# Patient Record
Sex: MALE | Race: WHITE | Employment: OTHER | URBAN - METROPOLITAN AREA
[De-identification: names, ages, dates, MRNs, and addresses within clinical notes are randomized per-mention and may not be internally consistent; named-entity substitution may affect disease eponyms.]

---

## 2017-06-22 ENCOUNTER — PATIENT OUTREACH (OUTPATIENT)
Dept: CASE MANAGEMENT | Age: 72
End: 2017-06-22

## 2018-05-15 PROBLEM — E78.00 PURE HYPERCHOLESTEROLEMIA: Status: ACTIVE | Noted: 2018-05-15

## 2021-07-02 ENCOUNTER — HOSPITAL ENCOUNTER (OUTPATIENT)
Dept: CT IMAGING | Age: 76
Discharge: HOME | End: 2021-07-02
Attending: FAMILY MEDICINE
Payer: COMMERCIAL

## 2021-07-02 DIAGNOSIS — R59.0 LAD (LYMPHADENOPATHY) OF RIGHT CERVICAL REGION: ICD-10-CM

## 2021-07-02 LAB — CREAT BLD-MCNC: 1 MG/DL (ref 0.8–1.5)

## 2021-07-02 PROCEDURE — 82565 ASSAY OF CREATININE: CPT

## 2021-07-02 PROCEDURE — 70491 CT SOFT TISSUE NECK W/DYE: CPT

## 2021-07-02 PROCEDURE — 74011000258 HC RX REV CODE- 258: Performed by: FAMILY MEDICINE

## 2021-07-02 PROCEDURE — 74011000636 HC RX REV CODE- 636: Performed by: FAMILY MEDICINE

## 2021-07-02 RX ORDER — SODIUM CHLORIDE 0.9 % (FLUSH) 0.9 %
10 SYRINGE (ML) INJECTION
Status: COMPLETED | OUTPATIENT
Start: 2021-07-02 | End: 2021-07-02

## 2021-07-02 RX ADMIN — IOPAMIDOL 80 ML: 755 INJECTION, SOLUTION INTRAVENOUS at 16:07

## 2021-07-02 RX ADMIN — Medication 10 ML: at 16:07

## 2021-07-02 RX ADMIN — SODIUM CHLORIDE 100 ML: 900 INJECTION, SOLUTION INTRAVENOUS at 16:08

## 2021-07-06 NOTE — PROGRESS NOTES
I know the patient and his wife have already seen the report. It not, please let him know that he has a mass in his throat. Please let him know that he needs to be seen by ENT (I recommend Dr. Rafi Montemayor). I will also place referral to oncology as well.   ENT needs to occur first.

## 2021-07-12 DIAGNOSIS — C01 CANCER OF BASE OF TONGUE (HCC): Primary | ICD-10-CM

## 2021-07-13 ENCOUNTER — ANESTHESIA EVENT (OUTPATIENT)
Dept: SURGERY | Age: 76
End: 2021-07-13
Payer: MEDICARE

## 2021-07-13 ENCOUNTER — HOSPITAL ENCOUNTER (OUTPATIENT)
Dept: SURGERY | Age: 76
Discharge: HOME | End: 2021-07-13

## 2021-07-13 VITALS — WEIGHT: 195 LBS | BODY MASS INDEX: 24.25 KG/M2 | HEIGHT: 75 IN

## 2021-07-13 NOTE — PERIOP NOTES
Patient verified name and . Order for consent was found in EHR and matches case posting; patient verifies procedure. Direct Laryngoscopy and Esophagoscopy with biopsies ONE TIME Routine      Type 1b surgery, PAT phone assessment complete. Orders not received. Labs per surgeon: None found in EHR  Labs per anesthesia protocol: None    Patient states has completed Moderna COVID vaccine series. Instructed to bring vaccination record card DOS, verbalized understanding    Patient answered medical/surgical history questions at their best of ability. All prior to admission medications documented in [facility]. Patient instructed to take the following medications the day of surgery according to anesthesia guidelines with a small sip of water: Levothyroxine On the day before surgery please take Acetaminophen 1000mg in the morning and then again before bed. You may substitute for Tylenol 650 mg. Hold all vitamins 7 days prior to surgery and NSAIDS 5 days prior to surgery. Prescription meds to hold:Celebrex        Patient instructed on the following:    > Arrive at A Entrance, time of arrival to be called the day before by 1700  > NPO after midnight including gum, mints, and ice chips  > Responsible adult must drive patient to the hospital, stay during surgery, and patient will need supervision 24 hours after anesthesia  > Use antibacterial soap in shower the night before surgery and on the morning of surgery  > All piercings must be removed prior to arrival.    > Leave all valuables (money and jewelry) at home but bring insurance card and ID on DOS.   > You may be required to pay a deductible or co-pay on the day of your procedure. You can pre-pay by calling [phone] if your surgery is at the [facility] or [phone] if your surgery is at the [facility]. > Do not wear make-up, nail polish, lotions, cologne, perfumes, powders, or oil on skin. Artificial nails are not permitted.

## 2021-07-14 ENCOUNTER — HOSPITAL ENCOUNTER (OUTPATIENT)
Age: 76
Setting detail: OUTPATIENT SURGERY
Discharge: HOME | End: 2021-07-14
Attending: OTOLARYNGOLOGY | Admitting: OTOLARYNGOLOGY
Payer: MEDICARE

## 2021-07-14 ENCOUNTER — ANESTHESIA (OUTPATIENT)
Dept: SURGERY | Age: 76
End: 2021-07-14
Payer: MEDICARE

## 2021-07-14 VITALS
HEART RATE: 79 BPM | TEMPERATURE: 98.9 F | WEIGHT: 193 LBS | BODY MASS INDEX: 24 KG/M2 | OXYGEN SATURATION: 98 % | DIASTOLIC BLOOD PRESSURE: 79 MMHG | HEIGHT: 75 IN | RESPIRATION RATE: 16 BRPM | SYSTOLIC BLOOD PRESSURE: 145 MMHG

## 2021-07-14 DIAGNOSIS — C01 CANCER OF BASE OF TONGUE (HCC): ICD-10-CM

## 2021-07-14 PROCEDURE — 76010000160 HC OR TIME 0.5 TO 1 HR INTENSV-TIER 1: Performed by: OTOLARYNGOLOGY

## 2021-07-14 PROCEDURE — 74011000250 HC RX REV CODE- 250: Performed by: REGISTERED NURSE

## 2021-07-14 PROCEDURE — 74011250637 HC RX REV CODE- 250/637: Performed by: ANESTHESIOLOGY

## 2021-07-14 PROCEDURE — 76210000020 HC REC RM PH II FIRST 0.5 HR: Performed by: OTOLARYNGOLOGY

## 2021-07-14 PROCEDURE — 77030037088 HC TUBE ENDOTRACH ORAL NSL COVD-A: Performed by: ANESTHESIOLOGY

## 2021-07-14 PROCEDURE — 88305 TISSUE EXAM BY PATHOLOGIST: CPT

## 2021-07-14 PROCEDURE — 43191 ESOPHAGOSCOPY RIGID TRNSO DX: CPT | Performed by: OTOLARYNGOLOGY

## 2021-07-14 PROCEDURE — 76060000032 HC ANESTHESIA 0.5 TO 1 HR: Performed by: OTOLARYNGOLOGY

## 2021-07-14 PROCEDURE — 74011250636 HC RX REV CODE- 250/636: Performed by: ANESTHESIOLOGY

## 2021-07-14 PROCEDURE — 76210000006 HC OR PH I REC 0.5 TO 1 HR: Performed by: OTOLARYNGOLOGY

## 2021-07-14 PROCEDURE — 2709999900 HC NON-CHARGEABLE SUPPLY: Performed by: OTOLARYNGOLOGY

## 2021-07-14 PROCEDURE — 88341 IMHCHEM/IMCYTCHM EA ADD ANTB: CPT

## 2021-07-14 PROCEDURE — 88360 TUMOR IMMUNOHISTOCHEM/MANUAL: CPT

## 2021-07-14 PROCEDURE — 88342 IMHCHEM/IMCYTCHM 1ST ANTB: CPT

## 2021-07-14 PROCEDURE — 74011250637 HC RX REV CODE- 250/637: Performed by: OTOLARYNGOLOGY

## 2021-07-14 PROCEDURE — 74011250636 HC RX REV CODE- 250/636: Performed by: REGISTERED NURSE

## 2021-07-14 PROCEDURE — 77030021678 HC GLIDESCP STAT DISP VERT -B: Performed by: ANESTHESIOLOGY

## 2021-07-14 PROCEDURE — 31535 LARYNGOSCOPY W/BIOPSY: CPT | Performed by: OTOLARYNGOLOGY

## 2021-07-14 RX ORDER — SODIUM CHLORIDE 0.9 % (FLUSH) 0.9 %
5-40 SYRINGE (ML) INJECTION EVERY 8 HOURS
Status: DISCONTINUED | OUTPATIENT
Start: 2021-07-14 | End: 2021-07-14 | Stop reason: HOSPADM

## 2021-07-14 RX ORDER — HYDROCODONE BITARTRATE AND ACETAMINOPHEN 5; 325 MG/1; MG/1
1 TABLET ORAL AS NEEDED
Status: DISCONTINUED | OUTPATIENT
Start: 2021-07-14 | End: 2021-07-14 | Stop reason: HOSPADM

## 2021-07-14 RX ORDER — ACETAMINOPHEN 500 MG
1000 TABLET ORAL ONCE
Status: COMPLETED | OUTPATIENT
Start: 2021-07-14 | End: 2021-07-14

## 2021-07-14 RX ORDER — HYDROMORPHONE HYDROCHLORIDE 2 MG/ML
0.5 INJECTION, SOLUTION INTRAMUSCULAR; INTRAVENOUS; SUBCUTANEOUS
Status: DISCONTINUED | OUTPATIENT
Start: 2021-07-14 | End: 2021-07-14 | Stop reason: HOSPADM

## 2021-07-14 RX ORDER — EPHEDRINE SULFATE/0.9% NACL/PF 50 MG/5 ML
SYRINGE (ML) INTRAVENOUS AS NEEDED
Status: DISCONTINUED | OUTPATIENT
Start: 2021-07-14 | End: 2021-07-14 | Stop reason: HOSPADM

## 2021-07-14 RX ORDER — EPINEPHRINE NASAL SOLUTION 1 MG/ML
SOLUTION NASAL AS NEEDED
Status: DISCONTINUED | OUTPATIENT
Start: 2021-07-14 | End: 2021-07-14 | Stop reason: HOSPADM

## 2021-07-14 RX ORDER — LIDOCAINE HYDROCHLORIDE 10 MG/ML
0.1 INJECTION INFILTRATION; PERINEURAL AS NEEDED
Status: DISCONTINUED | OUTPATIENT
Start: 2021-07-14 | End: 2021-07-14 | Stop reason: HOSPADM

## 2021-07-14 RX ORDER — MIDAZOLAM HYDROCHLORIDE 1 MG/ML
2 INJECTION, SOLUTION INTRAMUSCULAR; INTRAVENOUS
Status: DISCONTINUED | OUTPATIENT
Start: 2021-07-14 | End: 2021-07-14 | Stop reason: HOSPADM

## 2021-07-14 RX ORDER — SODIUM CHLORIDE, SODIUM LACTATE, POTASSIUM CHLORIDE, CALCIUM CHLORIDE 600; 310; 30; 20 MG/100ML; MG/100ML; MG/100ML; MG/100ML
150 INJECTION, SOLUTION INTRAVENOUS CONTINUOUS
Status: DISCONTINUED | OUTPATIENT
Start: 2021-07-14 | End: 2021-07-14 | Stop reason: HOSPADM

## 2021-07-14 RX ORDER — FENTANYL CITRATE 50 UG/ML
100 INJECTION, SOLUTION INTRAMUSCULAR; INTRAVENOUS ONCE
Status: DISCONTINUED | OUTPATIENT
Start: 2021-07-14 | End: 2021-07-14 | Stop reason: HOSPADM

## 2021-07-14 RX ORDER — SUCCINYLCHOLINE CHLORIDE 20 MG/ML
INJECTION INTRAMUSCULAR; INTRAVENOUS AS NEEDED
Status: DISCONTINUED | OUTPATIENT
Start: 2021-07-14 | End: 2021-07-14 | Stop reason: HOSPADM

## 2021-07-14 RX ORDER — FAMOTIDINE 20 MG/1
20 TABLET, FILM COATED ORAL ONCE
Status: COMPLETED | OUTPATIENT
Start: 2021-07-14 | End: 2021-07-14

## 2021-07-14 RX ORDER — ACETAMINOPHEN 500 MG
1000 TABLET ORAL
Status: DISCONTINUED | OUTPATIENT
Start: 2021-07-14 | End: 2021-07-14 | Stop reason: HOSPADM

## 2021-07-14 RX ORDER — ONDANSETRON 2 MG/ML
INJECTION INTRAMUSCULAR; INTRAVENOUS AS NEEDED
Status: DISCONTINUED | OUTPATIENT
Start: 2021-07-14 | End: 2021-07-14 | Stop reason: HOSPADM

## 2021-07-14 RX ORDER — SODIUM CHLORIDE 9 MG/ML
50 INJECTION, SOLUTION INTRAVENOUS CONTINUOUS
Status: DISCONTINUED | OUTPATIENT
Start: 2021-07-14 | End: 2021-07-14 | Stop reason: HOSPADM

## 2021-07-14 RX ORDER — PROPOFOL 10 MG/ML
INJECTION, EMULSION INTRAVENOUS AS NEEDED
Status: DISCONTINUED | OUTPATIENT
Start: 2021-07-14 | End: 2021-07-14 | Stop reason: HOSPADM

## 2021-07-14 RX ORDER — FENTANYL CITRATE 50 UG/ML
INJECTION, SOLUTION INTRAMUSCULAR; INTRAVENOUS AS NEEDED
Status: DISCONTINUED | OUTPATIENT
Start: 2021-07-14 | End: 2021-07-14 | Stop reason: HOSPADM

## 2021-07-14 RX ORDER — LIDOCAINE HYDROCHLORIDE 20 MG/ML
INJECTION, SOLUTION EPIDURAL; INFILTRATION; INTRACAUDAL; PERINEURAL AS NEEDED
Status: DISCONTINUED | OUTPATIENT
Start: 2021-07-14 | End: 2021-07-14 | Stop reason: HOSPADM

## 2021-07-14 RX ORDER — DEXAMETHASONE SODIUM PHOSPHATE 4 MG/ML
INJECTION, SOLUTION INTRA-ARTICULAR; INTRALESIONAL; INTRAMUSCULAR; INTRAVENOUS; SOFT TISSUE AS NEEDED
Status: DISCONTINUED | OUTPATIENT
Start: 2021-07-14 | End: 2021-07-14 | Stop reason: HOSPADM

## 2021-07-14 RX ORDER — SODIUM CHLORIDE 0.9 % (FLUSH) 0.9 %
5-40 SYRINGE (ML) INJECTION AS NEEDED
Status: DISCONTINUED | OUTPATIENT
Start: 2021-07-14 | End: 2021-07-14 | Stop reason: HOSPADM

## 2021-07-14 RX ADMIN — PROPOFOL 30 MG: 10 INJECTION, EMULSION INTRAVENOUS at 09:49

## 2021-07-14 RX ADMIN — DEXAMETHASONE SODIUM PHOSPHATE 10 MG: 4 INJECTION, SOLUTION INTRAMUSCULAR; INTRAVENOUS at 09:43

## 2021-07-14 RX ADMIN — Medication 10 MG: at 09:57

## 2021-07-14 RX ADMIN — LIDOCAINE HYDROCHLORIDE 60 MG: 20 INJECTION, SOLUTION EPIDURAL; INFILTRATION; INTRACAUDAL; PERINEURAL at 09:39

## 2021-07-14 RX ADMIN — FAMOTIDINE 20 MG: 20 TABLET, FILM COATED ORAL at 07:25

## 2021-07-14 RX ADMIN — FENTANYL CITRATE 50 MCG: 50 INJECTION INTRAMUSCULAR; INTRAVENOUS at 09:39

## 2021-07-14 RX ADMIN — SUCCINYLCHOLINE CHLORIDE 140 MG: 20 INJECTION, SOLUTION INTRAMUSCULAR; INTRAVENOUS at 09:39

## 2021-07-14 RX ADMIN — PROPOFOL 180 MG: 10 INJECTION, EMULSION INTRAVENOUS at 09:39

## 2021-07-14 RX ADMIN — SODIUM CHLORIDE, SODIUM LACTATE, POTASSIUM CHLORIDE, AND CALCIUM CHLORIDE: 600; 310; 30; 20 INJECTION, SOLUTION INTRAVENOUS at 09:47

## 2021-07-14 RX ADMIN — ACETAMINOPHEN 1000 MG: 500 TABLET, FILM COATED ORAL at 07:25

## 2021-07-14 RX ADMIN — PROPOFOL 30 MG: 10 INJECTION, EMULSION INTRAVENOUS at 09:47

## 2021-07-14 RX ADMIN — SODIUM CHLORIDE, SODIUM LACTATE, POTASSIUM CHLORIDE, AND CALCIUM CHLORIDE 150 ML/HR: 600; 310; 30; 20 INJECTION, SOLUTION INTRAVENOUS at 07:25

## 2021-07-14 RX ADMIN — Medication 10 MG: at 09:46

## 2021-07-14 RX ADMIN — FENTANYL CITRATE 50 MCG: 50 INJECTION INTRAMUSCULAR; INTRAVENOUS at 10:07

## 2021-07-14 RX ADMIN — ONDANSETRON 4 MG: 2 INJECTION INTRAMUSCULAR; INTRAVENOUS at 09:49

## 2021-07-14 NOTE — ANESTHESIA PREPROCEDURE EVALUATION
Relevant Problems   No relevant active problems       Anesthetic History   No history of anesthetic complications            Review of Systems / Medical History  Patient summary reviewed and pertinent labs reviewed    Pulmonary  Within defined limits                 Neuro/Psych   Within defined limits           Cardiovascular  Within defined limits                Exercise tolerance: >4 METS     GI/Hepatic/Renal  Within defined limits              Endo/Other      Hypothyroidism: well controlled  Arthritis     Other Findings              Physical Exam    Airway  Mallampati: II  TM Distance: 4 - 6 cm  Neck ROM: normal range of motion   Mouth opening: Normal     Cardiovascular  Regular rate and rhythm,  S1 and S2 normal,  no murmur, click, rub, or gallop  Rhythm: regular  Rate: normal         Dental    Dentition: Caps/crowns     Pulmonary  Breath sounds clear to auscultation               Abdominal         Other Findings            Anesthetic Plan    ASA: 2  Anesthesia type: general          Induction: Intravenous  Anesthetic plan and risks discussed with: Patient and Spouse

## 2021-07-14 NOTE — H&P
79 yo male seen earlier this wk for R neck mass and concern for BOT mass. He first noted some R neck swelling back in Feb- it has remained present since then but there has been some fluctuation in size. He has not had any throat pain, dysphagia or hoarseness but he has spit up some blood on several occasions, most recently this morning. He was able to get it stopped by gargling some cold water. He was worked up w/ CT neck recently at [facility]. He smoked for about 2 yrs but quit back in . Denies any alcohol abuse. Past Medical History:   Diagnosis Date    Arthritis     High cholesterol     Thyroid disease      Past Surgical History:   Procedure Laterality Date    HX COLONOSCOPY  10/26/2015    HX COLONOSCOPY  10/26/2015     Social History     Socioeconomic History    Marital status:      Spouse name: Not on file    Number of children: Not on file    Years of education: Not on file    Highest education level: Not on file   Occupational History    Not on file   Tobacco Use    Smoking status: Former Smoker     Types: Cigarettes     Quit date: 1970     Years since quittin.0    Smokeless tobacco: Never Used   Vaping Use    Vaping Use: Never used   Substance and Sexual Activity    Alcohol use: No     Alcohol/week: 0.0 standard drinks    Drug use: No    Sexual activity: Not Currently   Other Topics Concern    Not on file   Social History Narrative    Not on file     Social Determinants of Health     Financial Resource Strain:     Difficulty of Paying Living Expenses:    Food Insecurity:     Worried About [address] in the Last Year:     [address] in the Last Year:    Transportation Needs:     Lack of Transportation (Medical):      Lack of Transportation (Non-Medical):    Physical Activity:     Days of Exercise per Week:     Minutes of Exercise per Session:    Stress:     Feeling of Stress :    Social Connections:     Frequency of Communication with Friends and Family:  Frequency of Social Gatherings with Friends and Family:     Attends [employer] Services:     Active Member of Clubs or Organizations:     Attends Club or Organization Meetings:     Marital Status:    Intimate Partner Violence:     Fear of Current or Ex-Partner:     Emotionally Abused:     Physically Abused:     Sexually Abused:      Family History   Problem Relation Age of Onset    Cancer Mother     Heart Attack Father     Hypertension Sister     Alcohol abuse Brother     No Known Problems Maternal Grandmother     No Known Problems Maternal Grandfather     No Known Problems Paternal Grandfather      No Known Allergies    No current facility-administered medications on file prior to encounter. Current Outpatient Medications on File Prior to Encounter   Medication Sig Dispense Refill    celecoxib (CELEBREX) 200 mg capsule Take 1 Capsule by mouth daily. (Patient taking differently: Take 200 mg by mouth daily as needed.) 30 Capsule 5    levothyroxine (SYNTHROID) 75 mcg tablet Take 1 Tab by mouth Daily (before breakfast). 30 Tab 5    acetaminophen (TYLENOL) 650 mg CR tablet Take 650 mg by mouth daily.  coenzyme q10 (CO Q-10) 10 mg cap Take  by mouth.  multivitamin (ONE A DAY) tablet Take 1 Tab by mouth daily. EXAM:  Visit Vitals  BP (!) 145/80 (BP 1 Location: Left upper arm, BP Patient Position: At rest)   Pulse 80   Temp 98.1 °F (36.7 °C)   Resp 16   Ht 6' 3\" (1.905 m)   Wt 193 lb (87.5 kg)   SpO2 97%   BMI 24.12 kg/m²     General: NAD, well-appearing  Neuro: No gross neuro deficits. CN's II-XII intact. No facial weakness. Eyes: EOMI. Pupils reactive. No periorbital edema/ecchymosis. No nystagmus. Skin: No facial erythema, rashes or concerning lesions. Nose: No external deviations or saddling. Intranasally, septum is midline without perforations, nasal mucosa appears healthy with no erythema, mucopurulence, or polyps.   Mouth: Moist mucus membranes, some dried blood along palate, normal tongue/palate mobility, no concerning mucosal lesions. Oropharynx clear with no erythema/exudate, no tonsillar hypertrophy. Ears: Normal appearing auricles, no hematomas. EACs clear with no cerumen impaction, healthy canal skin, TM's intact with no perforations or retraction pockets. No middle ear effusions. Neck: Soft, supple, there is 2-3 cm firm but mobile R level 2 neck mass. No palpable parotid or submandibular masses. No thyromegaly or palpable thyroid nodules. No surgical scars. Lymphatics: No palpable cervical LAD on L side. CV: No JVD, no murmurs  Resp: No audible stridor or wheezing. Extremities: No clubbing or cyanosis. IMAGING:  CT neck-     FINDINGS: There is normal opacification of the major intracranial vessels. The  paranasal sinuses and mastoid air cells are clear. The nasopharynx is normal.  There is a 3.0 x 3.2 cm enhancing mass within the oropharynx on the right at the  level of the base of tongue. This is just cranial to the hyoid. There is an  enlarged jugulodigastric node, ipsilateral, on the right, subjacent to the BB  measuring 2.8 x 1.5 cm. The larynx and hypopharynx are normal. No abnormality of  the thyroid gland.     Evaluation of the upper lungs demonstrates no definite abnormality.     Bone window evaluation demonstrates no aggressive osseous lesions.     IMPRESSION  Oropharyngeal mass at the level of the base of tongue with enlarged  ipsilateral jugulodigastric lymph node. 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 A/P:  He has a R BOT neoplasm with associated R level 2 cervical node- this is highly concerning for underlying SCCA. The recent bleeding is most likely from this tumor. I will add him on later this wk for DL/E w/ bx of this lesion.  I discussed all the risks of laryngoscopy including bleeding, pharyngeal perforation, hoarseness, damage to teeth/lips/gums, and need for further procedures and he would like to cassandra.     [place] [address]

## 2021-07-14 NOTE — BRIEF OP NOTE
Brief Postoperative Note    Patient: Saman Sotelo  YOB: 1945  MRN: [id number]    Date of Procedure: 7/14/2021     Pre-Op Diagnosis: R base of Tongue mass     Post-Op Diagnosis: R base of Tongue mass    Procedure(s):  LARYNGOSCOPY DIRECT  ESOPHAGOSCOPY WITH BIOPSY    Surgeon(s):  Rodriguez Espinal MD    Surgical Assistant: Surg Asst-1: Paolo Wilburn    Anesthesia: General     Estimated Blood Loss (mL): Minimal    Complications: None    Specimens:   ID Type Source Tests Collected by Time Destination   1 : right tongue base mass Preservative Mass  Rodriguez Espinal MD 7/14/2021 2159 Pathology        Implants: * No implants in log *    Drains: * No LDAs found *    Findings: friable exophytic mass along R BOT- epiglottis was clear    Electronically Signed by Janie Baron MD on 7/14/2021 at 9:54 AM

## 2021-07-14 NOTE — OP NOTES
[address] [address]  OPERATIVE REPORT    Name:  Author Daily  MR#:  [id number]  :  1945  ACCOUNT #:  [de-identified]  DATE OF SERVICE:  2021    PREOPERATIVE DIAGNOSIS:  Right base of tongue neoplasm. POSTOPERATIVE DIAGNOSIS:  Right base of tongue neoplasm. PROCEDURES PERFORMED:  1. Rigid esophagoscopy. 2.  Direct laryngoscopy with biopsy. SURGEON:  Annette Barnes. Amos Brewster MD    ANESTHESIA:  General endotracheal.    ANESTHESIOLOGIST:  Shanthi Boles. Serafin Bright MD    COMPLICATIONS:  None. SPECIMENS REMOVED:  Right base of tongue mass - permanent. ESTIMATED BLOOD LOSS:  Minimal.    OPERATIVE FINDINGS:  1. There were no concerning lesions within the cervical esophagus. 2.  There was a friable, exophytic mass along the right base of tongue measuring at least 2 cm in size. It did cross midline to the left side, but did not involve the epiglottis or the right lateral pharyngeal wall. Multiple superficial and deep biopsies were taken. 3.  There were no other concerning lesions within the larynx or pharynx. IV FLUIDS:  500 mL crystalloid. DRAINS:  None. DISPOSITION:  PACU, then home. CONDITION:  Stable. BRIEF HISTORY:  The patient is a 68-year-old male who presented to my office earlier this week with enlarging right neck mass. He was worked up with a CT scan which revealed an enlarged right level 2 cervical lymph node as well as a mass along the right base of tongue. Interestingly, he has not had any sore throat or trouble swallowing during this time period. Flexible laryngoscopy in office revealed a friable mass along the right base of tongue. The decision was made to take him to the operating room for direct laryngoscopy, esophagoscopy, and biopsy of this mass. DESCRIPTION OF PROCEDURE:  The patient was brought back to the operating room and placed on the table in the supine position. General endotracheal anesthesia was inducted without any complications.   GlideScope was used to help with intubation due to the presence of a right tongue base mass. Once the patient was adequately sedated, the head of the table was turned 90 degrees counterclockwise. A shoulder roll was placed for improved neck extension. He was then sterilely prepped and draped in the usual fashion. After placing a maxillary dental guard, I first proceeded with rigid esophagoscopy. I advanced the rigid Jesberg esophagoscope down the right lateral gutter. It passed easily down into the cervical esophagus and I advanced about alf down the cervical esophagus. There were no strictures or concerning lesions noted. I was able to carefully withdraw the scope gaining full 360-degree visualization of the lumen on way out. Next, I proceeded with laryngoscopy. I used the Dedo laryngoscope to perform full visualization of the patient's larynx and pharynx. I advanced it down the left lateral gutter initially. The left tonsil and lateral pharyngeal wall appeared normal in appearance. I advanced it down into the left hypopharynx. There was a normal-appearing piriform sinus. I then moved the scope from left to the right along the posterior pharyngeal wall which was clear as well. Right piriform sinus was healthy as well. I pulled the scope back to visualize the glottis. There were normal appearing vocal cords bilaterally and normal supraglottic structures. As I pulled it back even further, I visualized the epiglottis which was normal in appearance. There was, however, an exophytic and friable mass along the right base of tongue, it measured at least 2 cm in size and did cross midline to the left side. There was no extension onto the lingual surface of the epiglottis and the right lateral pharyngeal wall was clear as well. At this point, multiple superficial and deep biopsies were taken using a large-cupped forceps.   There was just a mild amount of bleeding which was controlled using adrenaline-soaked neuro patwerner. Once hemostasis was ensured, all instrumentation was removed. I suctioned out the patient's stomach and then he was turned back to the anesthesia team, extubated and taken to the PACU in stable condition afterwards.       Colletta Bracket, MD [id number]  D:  07/14/2021 10:06  T:  07/14/2021 17:55  JOB #:  [id number]

## 2021-07-14 NOTE — ANESTHESIA POSTPROCEDURE EVALUATION
Procedure(s):  LARYNGOSCOPY DIRECT  ESOPHAGOSCOPY WITH BIOPSY.     general    Anesthesia Post Evaluation      Multimodal analgesia: multimodal analgesia used between 6 hours prior to anesthesia start to PACU discharge  Patient location during evaluation: PACU  Patient participation: complete - patient participated  Level of consciousness: awake and alert  Pain management: adequate  Airway patency: patent  Anesthetic complications: no  Cardiovascular status: acceptable and hemodynamically stable  Respiratory status: acceptable  Hydration status: acceptable  Post anesthesia nausea and vomiting:  controlled  Final Post Anesthesia Temperature Assessment:  Normothermia (36.0-37.5 degrees C)      INITIAL Post-op Vital signs:   Vitals Value Taken Time   /67 07/14/21 1025   Temp 37.2 °C (98.9 °F) 07/14/21 1013   Pulse 83 07/14/21 1025   Resp 16 07/14/21 1025   SpO2 98 % 07/14/21 1025

## 2021-07-14 NOTE — DISCHARGE INSTRUCTIONS
-Please start w/ liquids and soft foods and then advance to regular diet  -There may be some blood in your saliva for first couple of days after surgery  -You may have soreness along posterior neck after surgery      After general anesthesia or intravenous sedation, for 24 hours or while taking prescription Narcotics:  · Limit your activities  · A responsible adult needs to be with you for the next 24 hours  · Do not drive and operate hazardous machinery  · Do not make important personal or business decisions  · Do not drink alcoholic beverages  · If you have not urinated within 8 hours after discharge, please contact your surgeon on call. · If you have sleep apnea and have a CPAP machine, please use it for all naps and sleeping. · Please use caution when taking narcotics and any of your home medications that may cause drowsiness. *  Please give a list of your current medications to your Primary Care Provider. *  Please update this list whenever your medications are discontinued, doses are      changed, or new medications (including over-the-counter products) are added. *  Please carry medication information at all times in case of emergency situations. These are general instructions for a healthy lifestyle:  No smoking/ No tobacco products/ Avoid exposure to second hand smoke  Surgeon General's Warning:  Quitting smoking now greatly reduces serious risk to your health. Obesity, smoking, and sedentary lifestyle greatly increases your risk for illness  A healthy diet, regular physical exercise & weight monitoring are important for maintaining a healthy lifestyle    You may be retaining fluid if you have a history of heart failure or if you experience any of the following symptoms:  Weight gain of 3 pounds or more overnight or 5 pounds in a week, increased swelling in our hands or feet or shortness of breath while lying flat in bed.   Please call your doctor as soon as you notice any of these symptoms; do not wait until your next office visit.

## 2021-07-22 NOTE — PROGRESS NOTES
This note will not be viewable in [address] PATIENT OUTREACH    Initial attempt to schedule AWV appointment unsuccessful. Unable to reach patient. Message left for call back or to directly contact [address] @ [phone] during regular office hours, if preferred, to schedule AWV appointment.
L. chest & back

## 2021-07-29 ENCOUNTER — HOSPITAL ENCOUNTER (OUTPATIENT)
Dept: PET IMAGING | Age: 76
Discharge: HOME | End: 2021-07-29
Payer: MEDICARE

## 2021-07-29 DIAGNOSIS — C01 SQUAMOUS CELL CARCINOMA OF BASE OF TONGUE (HCC): ICD-10-CM

## 2021-07-29 LAB
GLUCOSE BLD STRIP.AUTO-MCNC: 92 MG/DL (ref 65–100)
SERVICE CMNT-IMP: NORMAL

## 2021-07-29 PROCEDURE — A9552 F18 FDG: HCPCS

## 2021-07-29 PROCEDURE — 82962 GLUCOSE BLOOD TEST: CPT

## 2021-07-29 PROCEDURE — 74011000636 HC RX REV CODE- 636: Performed by: OTOLARYNGOLOGY

## 2021-07-29 RX ORDER — SODIUM CHLORIDE 0.9 % (FLUSH) 0.9 %
10 SYRINGE (ML) INJECTION
Status: COMPLETED | OUTPATIENT
Start: 2021-07-29 | End: 2021-07-29

## 2021-07-29 RX ORDER — FLUDEOXYGLUCOSE F-18 200 MCI/ML
10 INJECTION INTRAVENOUS ONCE
Status: COMPLETED | OUTPATIENT
Start: 2021-07-29 | End: 2021-07-29

## 2021-07-29 RX ADMIN — Medication 10 ML: at 11:05

## 2021-07-29 RX ADMIN — DIATRIZOATE MEGLUMINE AND DIATRIZOATE SODIUM 10 ML: 660; 100 LIQUID ORAL; RECTAL at 11:05

## 2021-07-29 RX ADMIN — FLUDEOXYGLUCOSE F-18 13.91 MILLICURIE: 200 INJECTION INTRAVENOUS at 11:05

## 2021-08-02 PROBLEM — C01 SQUAMOUS CELL CARCINOMA OF BASE OF TONGUE (HCC): Status: ACTIVE | Noted: 2021-08-02

## 2021-08-03 ENCOUNTER — HOSPITAL ENCOUNTER (OUTPATIENT)
Dept: LAB | Age: 76
Discharge: HOME | End: 2021-08-03
Payer: MEDICARE

## 2021-08-03 ENCOUNTER — HOSPITAL ENCOUNTER (OUTPATIENT)
Dept: RADIATION ONCOLOGY | Age: 76
Discharge: HOME | End: 2021-08-03
Payer: MEDICARE

## 2021-08-03 VITALS
DIASTOLIC BLOOD PRESSURE: 90 MMHG | SYSTOLIC BLOOD PRESSURE: 135 MMHG | TEMPERATURE: 97.6 F | HEART RATE: 83 BPM | BODY MASS INDEX: 26 KG/M2 | WEIGHT: 191.7 LBS | OXYGEN SATURATION: 95 %

## 2021-08-03 DIAGNOSIS — C01 SQUAMOUS CELL CARCINOMA OF BASE OF TONGUE (HCC): ICD-10-CM

## 2021-08-03 LAB
ALBUMIN SERPL-MCNC: 3.8 G/DL (ref 3.2–4.6)
ALBUMIN/GLOB SERPL: 1 {RATIO} (ref 1.2–3.5)
ALP SERPL-CCNC: 63 U/L (ref 50–136)
ALT SERPL-CCNC: 26 U/L (ref 12–65)
ANION GAP SERPL CALC-SCNC: 4 MMOL/L (ref 7–16)
AST SERPL-CCNC: 20 U/L (ref 15–37)
BASOPHILS # BLD: 0 K/UL (ref 0–0.2)
BASOPHILS NFR BLD: 1 % (ref 0–2)
BILIRUB SERPL-MCNC: 0.9 MG/DL (ref 0.2–1.1)
BUN SERPL-MCNC: 14 MG/DL (ref 8–23)
CALCIUM SERPL-MCNC: 9.1 MG/DL (ref 8.3–10.4)
CHLORIDE SERPL-SCNC: 109 MMOL/L (ref 98–107)
CO2 SERPL-SCNC: 29 MMOL/L (ref 21–32)
CREAT SERPL-MCNC: 0.9 MG/DL (ref 0.8–1.5)
DIFFERENTIAL METHOD BLD: ABNORMAL
EOSINOPHIL # BLD: 0.2 K/UL (ref 0–0.8)
EOSINOPHIL NFR BLD: 3 % (ref 0.5–7.8)
ERYTHROCYTE [DISTWIDTH] IN BLOOD BY AUTOMATED COUNT: 12.8 % (ref 11.9–14.6)
GLOBULIN SER CALC-MCNC: 3.7 G/DL (ref 2.3–3.5)
GLUCOSE SERPL-MCNC: 73 MG/DL (ref 65–100)
HCT VFR BLD AUTO: 35.9 %
HGB BLD-MCNC: 12 G/DL (ref 13.6–17.2)
IMM GRANULOCYTES # BLD AUTO: 0 K/UL (ref 0–0.5)
IMM GRANULOCYTES NFR BLD AUTO: 0 % (ref 0–5)
LYMPHOCYTES # BLD: 0.8 K/UL (ref 0.5–4.6)
LYMPHOCYTES NFR BLD: 13 % (ref 13–44)
MAGNESIUM SERPL-MCNC: 2 MG/DL (ref 1.8–2.4)
MCH RBC QN AUTO: 30.5 PG (ref 26.1–32.9)
MCHC RBC AUTO-ENTMCNC: 33.4 G/DL (ref 31.4–35)
MCV RBC AUTO: 91.3 FL (ref 79.6–97.8)
MONOCYTES # BLD: 1 K/UL (ref 0.1–1.3)
MONOCYTES NFR BLD: 16 % (ref 4–12)
NEUTS SEG # BLD: 4.1 K/UL (ref 1.7–8.2)
NEUTS SEG NFR BLD: 67 % (ref 43–78)
NRBC # BLD: 0 K/UL (ref 0–0.2)
PLATELET # BLD AUTO: 204 K/UL (ref 150–450)
PMV BLD AUTO: 8.9 FL (ref 9.4–12.3)
POTASSIUM SERPL-SCNC: 3.7 MMOL/L (ref 3.5–5.1)
PROT SERPL-MCNC: 7.5 G/DL (ref 6.3–8.2)
RBC # BLD AUTO: 3.93 M/UL (ref 4.23–5.6)
SODIUM SERPL-SCNC: 142 MMOL/L (ref 136–145)
WBC # BLD AUTO: 6.1 K/UL (ref 4.3–11.1)

## 2021-08-03 PROCEDURE — 36415 COLL VENOUS BLD VENIPUNCTURE: CPT

## 2021-08-03 PROCEDURE — 85025 COMPLETE CBC W/AUTO DIFF WBC: CPT

## 2021-08-03 PROCEDURE — 80053 COMPREHEN METABOLIC PANEL: CPT

## 2021-08-03 PROCEDURE — 83735 ASSAY OF MAGNESIUM: CPT

## 2021-08-03 PROCEDURE — 99211 OFF/OP EST MAY X REQ PHY/QHP: CPT

## 2021-08-03 NOTE — PROGRESS NOTES
Pt is here for the initial RT consult with Dr. Rakan Chowdhury for right base of tongue SCC, p16+. Pt will be going to the dentist this afternoon to get dental clearance for RT. The plan is for pt to receive chemo/RT and he is followed by Dr. Bailee Strong and Kathlene Alpers, RD in [address] [place] [address]. His PET scan indicated not only the tongue cancer, but also activity of his lower rectal wall. Per Dr. Rakan Chowdhury, Dr. Bailee Strong will refer pt for a colonoscopy after pt. finishes his tongue cancer treatment. He is accompanied today by his wife. An overview of RT was given. RT consents were signed. The CT/Sim appt will be set once pt has dental clearance.

## 2021-08-03 NOTE — PROGRESS NOTES
Patient: Michelle Kennedy MRN: [id number]  SSN: [id number]    YOB: 1945  Age: 78 y.o. Sex: male      Other Providers:  Dr. Li Easton, Dr. 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 male who I am seeing at the request of Dr. Li Easton. Mr. Cy Cornejo has a remote smoking history (2 years, quit in 1970) and was in his usual state of health until approximately 02/2021 at which time he received his first Covid vaccination and noted swelling in his right neck. He was treated with empiric antibiotics and symptoms improved until 04/2021 at which time he had a recurrence also treated with antibiotics. He did well until 06/2021 when symptoms recurred and he was referred to ENT. On 7/2/2021 he underwent a CT of the neck which demonstrated a 3.0 x 3.2cm enhancing mass within the oropharynx on the right at the level of the base of tongue as well as an enlarged ipsilateral level 2 lymph node measuring 2.8 x 1.5cm. He was evaluated by Dr. Li Easton on 7/14/21 who noted a 2-3cm firm but mobile R level 2 neck mass. On 7/14/21 he underwent direct laryngoscopy and biopsy which confirmed a friable, exophytic mass along the right base of tongue measuring at least 2 cm in size, crossing midline to the lift, but not involving the epiglottis or right lateral pharyngeal wall. Pathology confirmed poorly differentiated squamous cell carcinoma, strongly p16 positive. PET/CT 7/29/21 demonstrated. A 1.9ZT hypermetabolic right base of tongue mass with bilateral enlarged level 2 lymph nodes measuring 19mm short axis on the right neck and 11mm short axis in the left neck as well as questionable FDG avidity in the lower rectal wall with soft tissue thickening on the noncontrast CT.  Mr. Cy Cornejo endorses persistent swelling in the neck and mild soreness with swallowing but denies significant pain. He is followed regularly by a dentist with no major dental issues. His energy level and appetite are excellent. He has no nausea, vomiting, or diarrhea and denies rectal bleeding. PAST MEDICAL HISTORY:    Past Medical History:   Diagnosis Date    Arthritis     Cancer of base of tongue ([address]     High cholesterol     Thyroid disease      The patient denies history of collagen vascular diseases, pacemaker insertion, prior radiation or prior chemotherapy. PAST SURGICAL HISTORY:   Past Surgical History:   Procedure Laterality Date    HX COLONOSCOPY  10/26/2015    HX COLONOSCOPY  10/26/2015    HX HEENT Right 07/14/2021    DL/E w/ bx of R BOT neoplasmDixie Code       MEDICATIONS:     Current Outpatient Medications:     CHOLESTEROL PO, Take  by mouth., Disp: , Rfl:     ondansetron (ZOFRAN ODT) 8 mg disintegrating tablet, Take 1 Tablet by mouth every eight (8) hours as needed for Nausea or Vomiting. Indications: prevent nausea and vomiting from cancer chemotherapy, Disp: 90 Tablet, Rfl: 1    prochlorperazine (Compazine) 10 mg tablet, Take 1 Tablet by mouth every six (6) hours as needed for Nausea or Vomiting. Indications: prevent nausea and vomiting from cancer chemotherapy, Disp: 90 Tablet, Rfl: 1    celecoxib (CELEBREX) 200 mg capsule, Take 1 Capsule by mouth daily. (Patient taking differently: Take 200 mg by mouth daily as needed.), Disp: 30 Capsule, Rfl: 5    levothyroxine (SYNTHROID) 75 mcg tablet, Take 1 Tab by mouth Daily (before breakfast). , Disp: 30 Tab, Rfl: 5    acetaminophen (TYLENOL) 650 mg CR tablet, Take 650 mg by mouth daily. , Disp: , Rfl:     coenzyme q10 (CO Q-10) 10 mg cap, Take  by mouth., Disp: , Rfl:     multivitamin (ONE A DAY) tablet, Take 1 Tab by mouth daily. , Disp: , Rfl:     ALLERGIES:   No Known Allergies    SOCIAL HISTORY:   Social History     Socioeconomic History    Marital status:      Spouse name: Not on file    Number of children: Not on file  Years of education: Not on file    Highest education level: Not on file   Occupational History    Not on file   Tobacco Use    Smoking status: Former Smoker     Types: Cigarettes     Quit date: 1970     Years since quittin.1    Smokeless tobacco: Never Used   Vaping Use    Vaping Use: Never used   Substance and Sexual Activity    Alcohol use: No     Alcohol/week: 0.0 standard drinks    Drug use: No    Sexual activity: Not Currently   Other Topics Concern    Not on file   Social History Narrative    Not on file     Social Determinants of Health     Financial Resource Strain:     Difficulty of Paying Living Expenses:    Food Insecurity:     Worried About Running Out of Food in the Last Year:     [address] in the Last Year:    Transportation Needs:     Lack of Transportation (Medical):  Lack of Transportation (Non-Medical):    Physical Activity:     Days of Exercise per Week:     Minutes of Exercise per Session:    Stress:     Feeling of Stress :    Social Connections:     Frequency of Communication with Friends and Family:     Frequency of Social Gatherings with Friends and Family:     Attends [employer] Services:     Active Member of Clubs or Organizations:     Attends Club or Organization Meetings:     Marital Status:    Intimate Partner Violence:     Fear of Current or Ex-Partner:     Emotionally Abused:     Physically Abused:     Sexually Abused:        FAMILY HISTORY:   Family History   Problem Relation Age of Onset    Cancer Mother     Heart Attack Father     Hypertension Sister     Alcohol abuse Brother     No Known Problems Maternal Grandmother     No Known Problems Maternal Grandfather     No Known Problems Paternal Grandfather        REVIEW OF SYSTEMS:   A full 12-point review of systems was completed and was negative unless noted in the history of present illness.     PHYSICAL EXAMINATION:   ECOG Performance status 0  VITAL SIGNS:   Visit Vitals  BP (!) 135/90   Pulse 83   Temp 97.6 °F (36.4 °C)   Wt 87 kg (191 lb 11.2 oz)   SpO2 95%   BMI 26.00 kg/m²     General: well developed/nourished adult Male in no acute distress; appears stated age  [de-identified]: normocephalic, atraumatic; EOMI; fullness in the right base of tongue, NPL deferred given recent exams and imaging  Neck: supple with full ROM; palpable bilateral cervical lymphadenopathy  Cardiovascular: normal S1 and S2, RRR, no murmurs  Respiratory: normal inspiratory effort, no audible wheezes  Extremities: no cyanosis, clubbing, or edema  Musculoskeletal: mobility intact x4; normal ROM in all joints  Skin: no skin lesions identified  Neuro: AOx3; sensation intact x 4; CNII-XII grossly intact  Psych: appropriate affect, insight, and judgement  GI: abdomen soft, non-distended    PATHOLOGY:    I personally reviewed the pathology reports as documented in the HPI. LABORATORY:   Lab Results   Component Value Date/Time    Sodium 146 (H) 03/30/2021 03:21 PM    Potassium 4.5 03/30/2021 03:21 PM    Chloride 106 03/30/2021 03:21 PM    CO2 24 03/30/2021 03:21 PM    Glucose 77 03/30/2021 03:21 PM    BUN 14 03/30/2021 03:21 PM    Creatinine 0.84 03/30/2021 03:21 PM    GFR est  03/30/2021 03:21 PM    GFR est non-AA 86 03/30/2021 03:21 PM    Calcium 9.5 03/30/2021 03:21 PM    Albumin 4.5 03/30/2021 03:21 PM    Protein, total 7.0 03/30/2021 03:21 PM    A-G Ratio 1.8 03/30/2021 03:21 PM    ALT (SGPT) 17 03/30/2021 03:21 PM     Lab Results   Component Value Date/Time    WBC 5.5 03/30/2021 03:21 PM    HGB 13.2 03/30/2021 03:21 PM    HCT 39.0 03/30/2021 03:21 PM    PLATELET 723 17/98/5978 03:21 PM       RADIOLOGY:    I personally reviewed the CT neck 7/2/21 and PET/CT 7/29/21 as documented in the HPI. IMPRESSION:  Tony Birmingham is a 78 y.o. male with T2N1 HPV+ SCC of the R base of tongue presenting for discussion of treatment.       I had a long discussion with Tony Birmingham regarding treatment options, specifically definitive concurrent chemoradiation for treatment of his early stage, curable head and neck malignancy. I reviewed in detail the anticipated acute and late toxicities of radiation therapy including dermatitis,as well as the logistics of treatment and expected disease control. Ash Shin and his family had the opportunity to ask questions which appeared to be answered to their satisfaction and have elected to proceed with treatment. PLAN:    1) Consented patient for treatment with external beam radiation after discussing risk, benefits, and side effects from treatment. 2) Reviewed available research treatment and cancer care protocols for which patient may be eligible. Patient may be a candidate for Sherice Leal but given abnormal rectal uptake on PET/CT would require significant additional workup which could delay treatment start, given this will defer study enrollment. 3) CT simulation pending dental clearance. 4) Colonoscopy on a non urgent basis.     Masha Skelton MD   August 3, 2021

## 2021-08-04 DIAGNOSIS — C01 SQUAMOUS CELL CARCINOMA OF BASE OF TONGUE (HCC): Primary | ICD-10-CM

## 2021-08-04 RX ORDER — LORAZEPAM 0.5 MG/1
1 TABLET ORAL
Qty: 30 TABLET | Refills: 0 | Status: SHIPPED | OUTPATIENT
Start: 2021-08-04 | End: 2021-09-10 | Stop reason: SDUPTHER

## 2021-08-05 ENCOUNTER — HOSPITAL ENCOUNTER (OUTPATIENT)
Dept: RADIATION ONCOLOGY | Age: 76
Discharge: HOME | End: 2021-08-05
Payer: MEDICARE

## 2021-08-05 PROCEDURE — 77470 SPECIAL RADIATION TREATMENT: CPT

## 2021-08-05 PROCEDURE — 77334 RADIATION TREATMENT AID(S): CPT

## 2021-08-06 ENCOUNTER — DOCUMENTATION ONLY (OUTPATIENT)
Dept: HEMATOLOGY | Age: 76
End: 2021-08-06

## 2021-08-06 NOTE — PROGRESS NOTES
I spoke with Ash Kip regarding his [facility] coverage, potential oral medication authorizations, enrollment in the [address] [place] [address] ([facility]) and the [address] [place] [address] ([address]), and assistance organization resource sheet. The patient would like to review the cancer programs. Next, I spoke with Ash Shin regarding the Oncology Care Model Notification Letter. I answered questions regarding the costs associated with Medicare Benefits. I explained to Ash Kip the estimated cost of treatment and services for six months under the [employer]. Ash Shin was advised to contact Medicare or their healthcare provider for questions or concerns related to service of care. The Oncology Care Model provides different options of contact for Ash Shin regarding concerns and complaints of treatments and services. The cost of 6 months of medical treatments and services can be estimated to be $8,025.00. Next, I spoke with sAh Shin regarding his Prescription Drug Benefits. Next, I spoke with Ash Shin about the financial assistance application. Next, I spoke with Ash Shin about billing questions and treatment services. We discussed copayments, deductibles, and out of pocket maximums. Next, we discussed costs associated with the Cisplatin Medication. Next, I spoke with Ash Shin regarding the Limited Power of [facility] document. After reading the form, Ash Kip signed the Limited Power of  document. Next, I spoke with Ash Shin regarding potential oral medication authorizations. I told him that if he ever had any problems getting his oral medications filled to give the dedicated [facility]  a call. Most of the time, it is simply an authorization that needs to be done with the insurance company.   Lastly, I gave Ash Shin a form with various resource organizations that could assist with specific needs (example:  transportation, lodging, preparing meals, home cleaning)      Mj Schilling expressed understanding of the information above and all questions were answered to his satisfaction.

## 2021-08-11 ENCOUNTER — HOSPITAL ENCOUNTER (OUTPATIENT)
Dept: RADIATION ONCOLOGY | Age: 76
Discharge: HOME | End: 2021-08-11
Payer: MEDICARE

## 2021-08-11 PROCEDURE — 77300 RADIATION THERAPY DOSE PLAN: CPT

## 2021-08-11 PROCEDURE — 77301 RADIOTHERAPY DOSE PLAN IMRT: CPT

## 2021-08-11 PROCEDURE — 77399 UNLISTED PX MED RADJ PHYSICS: CPT

## 2021-08-11 PROCEDURE — 77338 DESIGN MLC DEVICE FOR IMRT: CPT

## 2021-08-13 ENCOUNTER — HOSPITAL ENCOUNTER (OUTPATIENT)
Dept: LAB | Age: 76
Discharge: HOME | End: 2021-08-13
Payer: MEDICARE

## 2021-08-13 DIAGNOSIS — C01 CANCER OF BASE OF TONGUE (HCC): ICD-10-CM

## 2021-08-13 LAB
ALBUMIN SERPL-MCNC: 3.9 G/DL (ref 3.2–4.6)
ALBUMIN/GLOB SERPL: 1 {RATIO} (ref 1.2–3.5)
ALP SERPL-CCNC: 63 U/L (ref 50–136)
ALT SERPL-CCNC: 25 U/L (ref 12–65)
ANION GAP SERPL CALC-SCNC: 3 MMOL/L (ref 7–16)
AST SERPL-CCNC: 19 U/L (ref 15–37)
BASOPHILS # BLD: 0.1 K/UL (ref 0–0.2)
BASOPHILS NFR BLD: 1 % (ref 0–2)
BILIRUB SERPL-MCNC: 0.7 MG/DL (ref 0.2–1.1)
BUN SERPL-MCNC: 13 MG/DL (ref 8–23)
CALCIUM SERPL-MCNC: 8.7 MG/DL (ref 8.3–10.4)
CHLORIDE SERPL-SCNC: 108 MMOL/L (ref 98–107)
CO2 SERPL-SCNC: 28 MMOL/L (ref 21–32)
CREAT SERPL-MCNC: 0.9 MG/DL (ref 0.8–1.5)
DIFFERENTIAL METHOD BLD: ABNORMAL
EOSINOPHIL # BLD: 0.1 K/UL (ref 0–0.8)
EOSINOPHIL NFR BLD: 3 % (ref 0.5–7.8)
ERYTHROCYTE [DISTWIDTH] IN BLOOD BY AUTOMATED COUNT: 12.5 % (ref 11.9–14.6)
GLOBULIN SER CALC-MCNC: 3.9 G/DL (ref 2.3–3.5)
GLUCOSE SERPL-MCNC: 85 MG/DL (ref 65–100)
HCT VFR BLD AUTO: 37.5 %
HGB BLD-MCNC: 12.3 G/DL (ref 13.6–17.2)
IMM GRANULOCYTES # BLD AUTO: 0 K/UL (ref 0–0.5)
IMM GRANULOCYTES NFR BLD AUTO: 0 % (ref 0–5)
LYMPHOCYTES # BLD: 1 K/UL (ref 0.5–4.6)
LYMPHOCYTES NFR BLD: 21 % (ref 13–44)
MAGNESIUM SERPL-MCNC: 2.2 MG/DL (ref 1.8–2.4)
MCH RBC QN AUTO: 30.1 PG (ref 26.1–32.9)
MCHC RBC AUTO-ENTMCNC: 32.8 G/DL (ref 31.4–35)
MCV RBC AUTO: 91.7 FL (ref 79.6–97.8)
MONOCYTES # BLD: 0.7 K/UL (ref 0.1–1.3)
MONOCYTES NFR BLD: 15 % (ref 4–12)
NEUTS SEG # BLD: 3 K/UL (ref 1.7–8.2)
NEUTS SEG NFR BLD: 61 % (ref 43–78)
NRBC # BLD: 0 K/UL (ref 0–0.2)
PLATELET # BLD AUTO: 230 K/UL (ref 150–450)
PMV BLD AUTO: 9 FL (ref 9.4–12.3)
POTASSIUM SERPL-SCNC: 3.9 MMOL/L (ref 3.5–5.1)
PROT SERPL-MCNC: 7.8 G/DL (ref 6.3–8.2)
RBC # BLD AUTO: 4.09 M/UL (ref 4.23–5.6)
SODIUM SERPL-SCNC: 139 MMOL/L (ref 136–145)
WBC # BLD AUTO: 4.9 K/UL (ref 4.3–11.1)

## 2021-08-13 PROCEDURE — 83735 ASSAY OF MAGNESIUM: CPT

## 2021-08-13 PROCEDURE — 80053 COMPREHEN METABOLIC PANEL: CPT

## 2021-08-13 PROCEDURE — 36415 COLL VENOUS BLD VENIPUNCTURE: CPT

## 2021-08-13 PROCEDURE — 85025 COMPLETE CBC W/AUTO DIFF WBC: CPT

## 2021-08-16 ENCOUNTER — HOSPITAL ENCOUNTER (OUTPATIENT)
Dept: INFUSION THERAPY | Age: 76
Discharge: HOME | End: 2021-08-16
Payer: MEDICARE

## 2021-08-16 ENCOUNTER — APPOINTMENT (OUTPATIENT)
Dept: PHYSICAL THERAPY | Age: 76
End: 2021-08-16
Attending: INTERNAL MEDICINE

## 2021-08-16 ENCOUNTER — HOSPITAL ENCOUNTER (OUTPATIENT)
Dept: RADIATION ONCOLOGY | Age: 76
Discharge: HOME | End: 2021-08-16
Payer: MEDICARE

## 2021-08-16 VITALS
OXYGEN SATURATION: 97 % | BODY MASS INDEX: 24.86 KG/M2 | RESPIRATION RATE: 16 BRPM | DIASTOLIC BLOOD PRESSURE: 70 MMHG | TEMPERATURE: 97.9 F | SYSTOLIC BLOOD PRESSURE: 141 MMHG | HEART RATE: 77 BPM | WEIGHT: 191 LBS

## 2021-08-16 DIAGNOSIS — C01 SQUAMOUS CELL CARCINOMA OF BASE OF TONGUE (HCC): Primary | ICD-10-CM

## 2021-08-16 DIAGNOSIS — R21 RASH: ICD-10-CM

## 2021-08-16 PROCEDURE — 74011250636 HC RX REV CODE- 250/636: Performed by: INTERNAL MEDICINE

## 2021-08-16 PROCEDURE — 96367 TX/PROPH/DG ADDL SEQ IV INF: CPT

## 2021-08-16 PROCEDURE — 96375 TX/PRO/DX INJ NEW DRUG ADDON: CPT

## 2021-08-16 PROCEDURE — 96374 THER/PROPH/DIAG INJ IV PUSH: CPT

## 2021-08-16 PROCEDURE — 74011000258 HC RX REV CODE- 258: Performed by: INTERNAL MEDICINE

## 2021-08-16 PROCEDURE — 96413 CHEMO IV INFUSION 1 HR: CPT

## 2021-08-16 PROCEDURE — 74011250636 HC RX REV CODE- 250/636: Performed by: NURSE PRACTITIONER

## 2021-08-16 PROCEDURE — 77386 HC IMRT TRMT DLVR COMPL: CPT

## 2021-08-16 RX ORDER — DIPHENHYDRAMINE HYDROCHLORIDE 50 MG/ML
25 INJECTION, SOLUTION INTRAMUSCULAR; INTRAVENOUS ONCE
Status: COMPLETED | OUTPATIENT
Start: 2021-08-16 | End: 2021-08-16

## 2021-08-16 RX ORDER — FAMOTIDINE 10 MG/ML
20 INJECTION INTRAVENOUS ONCE
Status: COMPLETED | OUTPATIENT
Start: 2021-08-16 | End: 2021-08-16

## 2021-08-16 RX ORDER — DIPHENHYDRAMINE HYDROCHLORIDE 50 MG/ML
50 INJECTION, SOLUTION INTRAMUSCULAR; INTRAVENOUS AS NEEDED
Status: ACTIVE | OUTPATIENT
Start: 2021-08-16 | End: 2021-08-16

## 2021-08-16 RX ORDER — SODIUM CHLORIDE 9 MG/ML
25 INJECTION, SOLUTION INTRAVENOUS CONTINUOUS
Status: ACTIVE | OUTPATIENT
Start: 2021-08-16 | End: 2021-08-16

## 2021-08-16 RX ORDER — SODIUM CHLORIDE 0.9 % (FLUSH) 0.9 %
10 SYRINGE (ML) INJECTION AS NEEDED
Status: ACTIVE | OUTPATIENT
Start: 2021-08-16 | End: 2021-08-16

## 2021-08-16 RX ORDER — HYDROCORTISONE SODIUM SUCCINATE 100 MG/2ML
100 INJECTION, POWDER, FOR SOLUTION INTRAMUSCULAR; INTRAVENOUS AS NEEDED
Status: ACTIVE | OUTPATIENT
Start: 2021-08-16 | End: 2021-08-16

## 2021-08-16 RX ORDER — ONDANSETRON 2 MG/ML
8 INJECTION INTRAMUSCULAR; INTRAVENOUS ONCE
Status: COMPLETED | OUTPATIENT
Start: 2021-08-16 | End: 2021-08-16

## 2021-08-16 RX ADMIN — SODIUM CHLORIDE 25 ML/HR: 9 INJECTION, SOLUTION INTRAVENOUS at 09:00

## 2021-08-16 RX ADMIN — FAMOTIDINE 20 MG: 10 INJECTION, SOLUTION INTRAVENOUS at 15:29

## 2021-08-16 RX ADMIN — Medication 10 ML: at 09:00

## 2021-08-16 RX ADMIN — CISPLATIN 84 MG: 1 INJECTION, SOLUTION INTRAVENOUS at 11:37

## 2021-08-16 RX ADMIN — DIPHENHYDRAMINE HYDROCHLORIDE 25 MG: 50 INJECTION, SOLUTION INTRAMUSCULAR; INTRAVENOUS at 15:32

## 2021-08-16 RX ADMIN — DEXAMETHASONE SODIUM PHOSPHATE 12 MG: 4 INJECTION, SOLUTION INTRAMUSCULAR; INTRAVENOUS at 09:12

## 2021-08-16 RX ADMIN — POTASSIUM CHLORIDE: 2 INJECTION, SOLUTION, CONCENTRATE INTRAVENOUS at 09:56

## 2021-08-16 RX ADMIN — ONDANSETRON 8 MG: 2 INJECTION INTRAMUSCULAR; INTRAVENOUS at 09:54

## 2021-08-16 RX ADMIN — FOSAPREPITANT 150 MG: 150 INJECTION, POWDER, LYOPHILIZED, FOR SOLUTION INTRAVENOUS at 09:32

## 2021-08-16 NOTE — PROGRESS NOTES
Returned to the infusion center. No rredness or itching noted. Received Cisplatin this am. During RT pt. noted to have redness on chest and back. Pepcid and Benadryl given as ordered. Discharged in satisfactory condition with no complaints.

## 2021-08-16 NOTE — PROGRESS NOTES
Problem: Chemotherapy Treatment  Goal: *Chemotherapy regimen followed  Outcome: Progressing Towards Goal  Goal: *Hemodynamically stable  Outcome: Progressing Towards Goal  Goal: *Tolerating diet  Outcome: Progressing Towards Goal     Problem: Neutropenia  Goal: *Verbalizes and demonstrates neutropenic precautions  Outcome: Progressing Towards Goal  Goal: *Verbalizes understanding and describes prescribed diet  Outcome: Progressing Towards Goal     Problem: Infection - Risk of, Central Venous Catheter-Associated Bloodstream Infection  Goal: *Absence of infection signs and symptoms  Outcome: Progressing Towards Goal     Problem: Pain  Goal: *Control of Pain  Outcome: Progressing Towards Goal     Problem: Anemia Care Plan (Adult and Pediatric)  Goal: *Labs within defined limits  Outcome: Progressing Towards Goal  Goal: *Tolerates increased activity  Outcome: Progressing Towards Goal     Problem: Anxiety  Goal: *Alleviation of anxiety  Outcome: Progressing Towards Goal     Problem: Altered Thought Process (Adult)  Goal: *Participates in Treatment Plan  Outcome: Progressing Towards Goal     Problem: Constipation - Risk of  Goal: *Prevention of constipation  Outcome: Progressing Towards Goal     Problem: Knowledge Deficit  Goal: *Verbalizes understanding of procedures and medications  Outcome: Progressing Towards Goal     Problem: Patient Education:  Go to Education Activity  Goal: Patient/Family Education  Outcome: Progressing Towards Goal

## 2021-08-16 NOTE — PROGRESS NOTES
Clinical Social Work Note  Name: Seble Vázquez    : 1945    MRN: [id number]    Date of Service: 2021    Type of Service: Case Management & Health and Behavior Intervention - Initial Assessment (30052)    Length of Service: 16 minutes    Patient Diagnosis:   1. Squamous cell carcinoma of base of tongue [facility])         Referral Source: Infusion RN    Reason for Visit: D1C1    CM Note: Seen patient with his wife provided therapeutic reassurance. LISW-CP introduced self, presented [employer] and others. At this moment, pt denies any psychosocial and economic needs.  Note: LISW-CP discussed Distress by using NCCN Guidelines for Patients, Distress with patient and his wife. Mini Mental Status Exam: Seble Vázquez was dressed properly. No abnormal psychomotor movements observed. Intellectual functioning appeared to be intact. Insight was adequate. Judgment was adequate. Patient did not report suicidal ideations, intent or plans. Speech was coherent. Thought process was clear. Patient did not report homicidal ideations, intent or plans. Patient was oriented to self, place, time and situation. Protective Factors: Current care for physical and mental illness, adequate insight and judgment, family support, cultural and Spiritism beliefs and values that support self-care. Next Steps: LISW-CP gave contact information and encouraged pt to call should any needs arise. Pt verbalized understanding. LIS -CP intends to follow up as needed.       3 most recent [facility] 2021 2021 3/30/2021   Little interest or pleasure in doing things Not at all Not at all Not at all   Feeling down, depressed, irritable, or hopeless Not at all Not at all Not at all   Total Score PHQ 2 0 0 0           Electronically Signed By:  Jazlyn Estes

## 2021-08-16 NOTE — PROGRESS NOTES
Arrived to the [facility]. D1C1 Cisplatin completed. Patient tolerated well, chemo education done prior to infusion, consent verified in chart. Any issues or concerns during appointment: patient tolerated 30 min wait time following completion of chemo with no problems. Patient aware of next infusion appointment on 8/23 at 11:00am.  Discharged ambulatory to Radiation department.

## 2021-08-17 ENCOUNTER — APPOINTMENT (OUTPATIENT)
Dept: RADIATION ONCOLOGY | Age: 76
End: 2021-08-17
Payer: MEDICARE

## 2021-08-17 ENCOUNTER — HOSPITAL ENCOUNTER (OUTPATIENT)
Dept: PHYSICAL THERAPY | Age: 76
Discharge: HOME | End: 2021-08-17
Attending: INTERNAL MEDICINE
Payer: MEDICARE

## 2021-08-17 ENCOUNTER — HOSPITAL ENCOUNTER (OUTPATIENT)
Dept: RADIATION ONCOLOGY | Age: 76
Discharge: HOME | End: 2021-08-17
Payer: MEDICARE

## 2021-08-17 DIAGNOSIS — C01 SQUAMOUS CELL CARCINOMA OF BASE OF TONGUE (HCC): ICD-10-CM

## 2021-08-17 PROCEDURE — 77386 HC IMRT TRMT DLVR COMPL: CPT

## 2021-08-17 PROCEDURE — 92610 EVALUATE SWALLOWING FUNCTION: CPT

## 2021-08-17 NOTE — THERAPY EVALUATION
Ferdinand Mondragon  : 1945  Primary: [place]  Secondary:  [address] [place]  at [address] [facility], [address], [place], [facility]  [phone]   [phone]        OUTPATIENT SPEECH LANGUAGE PATHOLOGY: Initial Assessment  ICD-10: Treatment Diagnosis: dysphagia, pharyngeal R 13.13  REFERRING PHYSICIAN: Nayla Nash MD MD Orders: speech evaluate and treat   PAST MEDICAL HISTORY:    Mr. Rashmi Jara is a 78 y.o. male who  has a past medical history of Arthritis, Cancer of base of tongue ([address] High cholesterol, and Thyroid disease. He also has no past medical history of Chronic pain, Malignant hyperthermia due to anesthesia, or Pseudocholinesterase deficiency. He also  has a past surgical history that includes hx colonoscopy (10/26/2015); hx colonoscopy (10/26/2015); and hx heent (Right, 2021). MEDICAL/REFERRING DIAGNOSIS: Squamous cell carcinoma of base of tongue ([facility] [C01]  DATE OF ONSET: 2021   PRIOR LEVEL OF FUNCTION: with spouse  PRECAUTIONS/ALLERGIES: Patient has no known allergies. ASSESSMENT:  Mr. Rashmi Jara is a 77 y/o male referred to  due to dysphagia. Per chart review, he was diagnosed with SCC of the base of tongue in July of this year. He began concurrent chemo/XRT . He is scheduled to attend weekly Cisplatin x 7 weeks and daily XRT Mon-Friday x 7 weeks (35 fractions). He reported this date no difficulties with swallowing at this time. He reported he is still consuming a regular diet. Based on the objective data described below, the patient's swallowing appears WFL at this time. However, due to recent dx of base of tongue cancer and the fact he is undergoing chemo and radiation, it is expected pt will have a decline in swallow function. An oral motor evaluation revealed min redness around his uvula and velum. He was given trials thin liquids, mixed consistencies and solids. Mastication was adequate.   No signs/sx aspiration observed. Discussed changes to voice and swallowing the pt can expect while going through treatment. Also introduced laryngeal exercises with a handout provided. Recommend ST to address deficits. Pt reported he prefers ST once weekly to his co-pay. Also recommend a MBS to further assess swallow function. Patient will benefit from skilled intervention to address the below impairments. ?????? ? ? This section established at most recent assessment??????????  PROBLEM LIST (Impairments causing functional limitations):  1. Dysphagia   GOALS: (Goals have been discussed and agreed upon with patient.)  SHORT-TERM FUNCTIONAL GOALS: Time Frame: 3 months   Pt will complete laryngeal exercises with 80% accuracy to ensure maintenance of swallow function. Pt will complete tongue base exercises with 80% accuracy to ensure maintenance of swallow function  Pt will complete exercises a min of 5 days weekly to ensure maintenance of swallow function. Pt will use compensatory as indicated to ensure safe, adequate consumption of a po diet with only min cues required. Pt will participate in a MBS x1 to further assess swallow function. DISCHARGE GOALS: Time Frame: 4-5 months   1. Pt will tolerate least restrictive diet without signs/sx aspiration 100% for safe swallow function. REHABILITATION POTENTIAL FOR STATED GOALS: GoodPLAN OF CARE:  Patient will benefit from skilled intervention to address the following impairments.   RECOMMENDATIONS AND PLANNED INTERVENTIONS (Benefits and precautions of therapy have been discussed with the patient.):  · continue prescribed diet  · will modify diet as needed during course of chemo/radiation  MEDICATIONS:  · With liquid  COMPENSATORY STRATEGIES/MODIFICATIONS INCLUDING:  · None at this time  · Will modify strategies as needed during course of radiation  OTHER RECOMMENDATIONS (including follow up treatment recommendations):   · Laryngeal exercises  · Patient education  RECOMMENDED DIET MODIFICATIONS DISCUSSED WITH:  · Patient  TREATMENT PLAN EFFECTIVE DATES: 8/17/2021 TO 11/15/2021 (90 days). FREQUENCY/DURATION: Continue to follow patient 1 time a week for 90 days to address above goals. Regarding Soo Goldman's therapy, I certify that the treatment plan above will be carried out by a therapist or under their direction. Thank you for this referral,  Berny Baker, [facility], [facility] [place], [address] [facility]                    Referring Physician Signature: Jerardo Alas MD    Date      SUBJECTIVE:  Pt cooperative. Present Symptoms: recent dx of base of tongue cancer      Current Medications:   Current Outpatient Medications:     triamcinolone acetonide (KENALOG) 0.025 % topical cream, Apply  to affected area two (2) times a day. use thin layer, Disp: 15 g, Rfl: 0    LORazepam (ATIVAN) 0.5 mg tablet, Take 2 Tablets by mouth daily as needed for Anxiety (take 1-2 pills as needed for anxiety 30 min prior to radiation). , Disp: 30 Tablet, Rfl: 0    CHOLESTEROL PO, Take  by mouth., Disp: , Rfl:     ondansetron (ZOFRAN ODT) 8 mg disintegrating tablet, Take 1 Tablet by mouth every eight (8) hours as needed for Nausea or Vomiting. Indications: prevent nausea and vomiting from cancer chemotherapy, Disp: 90 Tablet, Rfl: 1    prochlorperazine (Compazine) 10 mg tablet, Take 1 Tablet by mouth every six (6) hours as needed for Nausea or Vomiting. Indications: prevent nausea and vomiting from cancer chemotherapy, Disp: 90 Tablet, Rfl: 1    celecoxib (CELEBREX) 200 mg capsule, Take 1 Capsule by mouth daily. (Patient taking differently: Take 200 mg by mouth daily as needed.), Disp: 30 Capsule, Rfl: 5    levothyroxine (SYNTHROID) 75 mcg tablet, Take 1 Tab by mouth Daily (before breakfast). , Disp: 30 Tab, Rfl: 5    acetaminophen (TYLENOL) 650 mg CR tablet, Take 650 mg by mouth daily. , Disp: , Rfl:     coenzyme q10 (CO Q-10) 10 mg cap, Take  by mouth., Disp: , Rfl:     multivitamin (ONE A DAY) tablet, Take 1 Tab by mouth daily. , Disp: , Rfl:    Date Last Reviewed: 8/17/21  Current Dietary Status:  Regular       History of reflux:  NO    Reflux medication:   Social History/Home Situation: with spouse      Work/Activity History: retired    OBJECTIVE:  Objective Measure: Tool Used: National Outcomes Measurement System: Functional Communication Measures: SWALLOWING  Score:  Initial: 6 Most Recent: X (Date: -- )   Interpretation of Tool: This measure describes the change in functional communication status subsequent to speech-language pathology treatment of patients with dysphagia.  o Level 1:  Individual is not able to swallow anything safely by mouth. All nutrition and hydration is received through non-oral means (e.g., nasogastric tube, PEG). o Level 2: Individual is not able to swallow safely by mouth for nutrition and hydration, but may take some consistency with consistent maximal cues in therapy only. Alternative method of feeding required. o Level 3:  Alternative method of feeding required as individual takes less than 50% of nutrition and hydration by mouth, and/or swallowing is safe with consistent use of moderate cues to use compensatory strategies and/or requires maximum diet restriction. o Level 4:  Swallowing is safe, but usually requires moderate cues to use compensatory strategies, and/or the individual has moderate diet restrictions and/or still requires tube feeding and/or oral supplements. o Level 5:  Swallowing is safe with minimal diet restriction and/or occasionally requires minimal cueing to use compensatory strategies. The individual may occasionally self-cue. All nutrition and hydration needs are met by mouth at mealtime. o Level 6:  Swallowing is safe, and the individual eats and drinks independently and may rarely require minimal cueing. The individual usually self-cues when difficulty occurs.  May need to avoid specific food items (e.g., popcorn and nuts), or require additional time (due to dysphagia). o Level 7: The individuals ability to eat independently is not limited by swallow function. Swallowing would be safe and efficient for all consistencies. Compensatory strategies are effectively used when needed. Score Level 7 Level 6 Level 5 Level 4 Level 3 Level 2 Level 1   Modifier CH CI CJ CK CL CM CN       Oral Motor Structure/Speech:  Oral-Motor Structure/Motor Speech  Labial: No impairment  Dentition: Intact, Natural  Oral Hygiene: min redness around uvula/velum  Lingual: No impairment    Cognitive and Communication Status:  Neurologic State: Alert    BEDSIDE SWALLOW EVALUATION  Oral Assessment:  Oral Assessment  Labial: No impairment  Dentition: Intact; Natural  Oral Hygiene: min redness around uvula/velum  Lingual: No impairment  P.O. Trials:  Patient Position: upright in chair    The patient was given teaspoon to cup amounts of the following:   Consistency Presented: Mixed consistency; Solid; Thin liquid  How Presented: Self-fed/presented;Cup/sip;Spoon; Successive swallows    ORAL PHASE:  Bolus Acceptance: No impairment  Bolus Formation/Control: No impairment  Propulsion: No impairment     Oral Residue: None    PHARYNGEAL PHASE:  Initiation of Swallow: No impairment  Laryngeal Elevation: Functional  Aspiration Signs/Symptoms: None  Vocal Quality: No impairment                OTHER OBSERVATIONS:  Rate/bite size: WNL   Endurance: WNL     TREATMENT:    (In addition to Assessment/Re-Assessment sessions the following treatments were rendered)  Assessment only; No treatment(s) provided today    LARYNGEAL / PHARYNGEAL EXERCISES:           Effortful Swallow: Yes  Reps :  (3 reps)  Sets : 1                                Rylie:  Yes  Reps :  (3 reps)  Sets : 1                                                                Tongue Back & Hold: Yes  Reps :  (3 reps)  Sets : 1 __________________________________________________________________________________________________  Treatment Assessment:    Progression/Medical Necessity:   · Skilled intervention continues to be required due to medical complications. Compliance with Program/Exercises: Will assess as treatment progresses. Reason for Continuation of Services/Other Comments:  · Patient continues to require skilled intervention due to medical complications. Recommendations/Intent for next treatment session: \"Treatment next visit will focus on laryngeal exercises\".     Total Treatment Duration:  Time In: 1115  Time Out: [address] [facility], [facility] [place], [facility]    Visit Approval Visit # Therapist initials Date A NS / Cx < 24 hr >24 hr Cx Comments    1 RL 8/17 [x]  [] [] Initial evaluation       [] [] []        [] [] []        [] [] []        [] [] []        [] [] []        [] [] []        [] [] []        [] [] []        [] [] []        [] [] []        [] [] []        [] [] []        [] [] []        [] [] []        [] [] []        [] [] []        [] [] []

## 2021-08-18 ENCOUNTER — HOSPITAL ENCOUNTER (OUTPATIENT)
Dept: RADIATION ONCOLOGY | Age: 76
Discharge: HOME | End: 2021-08-18
Payer: MEDICARE

## 2021-08-18 ENCOUNTER — APPOINTMENT (OUTPATIENT)
Dept: RADIATION ONCOLOGY | Age: 76
End: 2021-08-18
Payer: MEDICARE

## 2021-08-18 PROCEDURE — 77386 HC IMRT TRMT DLVR COMPL: CPT

## 2021-08-19 ENCOUNTER — APPOINTMENT (OUTPATIENT)
Dept: RADIATION ONCOLOGY | Age: 76
End: 2021-08-19
Payer: MEDICARE

## 2021-08-19 ENCOUNTER — HOSPITAL ENCOUNTER (OUTPATIENT)
Dept: RADIATION ONCOLOGY | Age: 76
Discharge: HOME | End: 2021-08-19
Payer: MEDICARE

## 2021-08-19 PROCEDURE — 77386 HC IMRT TRMT DLVR COMPL: CPT

## 2021-08-20 ENCOUNTER — APPOINTMENT (OUTPATIENT)
Dept: RADIATION ONCOLOGY | Age: 76
End: 2021-08-20
Payer: MEDICARE

## 2021-08-20 ENCOUNTER — HOSPITAL ENCOUNTER (OUTPATIENT)
Dept: LAB | Age: 76
Discharge: HOME | End: 2021-08-20
Payer: MEDICARE

## 2021-08-20 ENCOUNTER — HOSPITAL ENCOUNTER (OUTPATIENT)
Dept: RADIATION ONCOLOGY | Age: 76
Discharge: HOME | End: 2021-08-20
Payer: MEDICARE

## 2021-08-20 VITALS
TEMPERATURE: 98.1 F | BODY MASS INDEX: 24.66 KG/M2 | DIASTOLIC BLOOD PRESSURE: 88 MMHG | WEIGHT: 189.5 LBS | SYSTOLIC BLOOD PRESSURE: 152 MMHG | HEART RATE: 85 BPM | OXYGEN SATURATION: 98 %

## 2021-08-20 DIAGNOSIS — C01 SQUAMOUS CELL CARCINOMA OF BASE OF TONGUE (HCC): ICD-10-CM

## 2021-08-20 LAB
ALBUMIN SERPL-MCNC: 3.8 G/DL (ref 3.2–4.6)
ALBUMIN/GLOB SERPL: 1 {RATIO} (ref 1.2–3.5)
ALP SERPL-CCNC: 63 U/L (ref 50–136)
ALT SERPL-CCNC: 27 U/L (ref 12–65)
ANION GAP SERPL CALC-SCNC: 6 MMOL/L (ref 7–16)
AST SERPL-CCNC: 22 U/L (ref 15–37)
BASOPHILS # BLD: 0 K/UL (ref 0–0.2)
BASOPHILS NFR BLD: 0 % (ref 0–2)
BILIRUB SERPL-MCNC: 1 MG/DL (ref 0.2–1.1)
BUN SERPL-MCNC: 12 MG/DL (ref 8–23)
CALCIUM SERPL-MCNC: 8.6 MG/DL (ref 8.3–10.4)
CHLORIDE SERPL-SCNC: 102 MMOL/L (ref 98–107)
CO2 SERPL-SCNC: 28 MMOL/L (ref 21–32)
CREAT SERPL-MCNC: 0.8 MG/DL (ref 0.8–1.5)
DIFFERENTIAL METHOD BLD: ABNORMAL
EOSINOPHIL # BLD: 0 K/UL (ref 0–0.8)
EOSINOPHIL NFR BLD: 0 % (ref 0.5–7.8)
ERYTHROCYTE [DISTWIDTH] IN BLOOD BY AUTOMATED COUNT: 11.9 % (ref 11.9–14.6)
GLOBULIN SER CALC-MCNC: 4 G/DL (ref 2.3–3.5)
GLUCOSE SERPL-MCNC: 96 MG/DL (ref 65–100)
HCT VFR BLD AUTO: 36.6 %
HGB BLD-MCNC: 12.4 G/DL (ref 13.6–17.2)
IMM GRANULOCYTES # BLD AUTO: 0 K/UL (ref 0–0.5)
IMM GRANULOCYTES NFR BLD AUTO: 0 % (ref 0–5)
LYMPHOCYTES # BLD: 0.4 K/UL (ref 0.5–4.6)
LYMPHOCYTES NFR BLD: 6 % (ref 13–44)
MAGNESIUM SERPL-MCNC: 1.9 MG/DL (ref 1.8–2.4)
MCH RBC QN AUTO: 30.2 PG (ref 26.1–32.9)
MCHC RBC AUTO-ENTMCNC: 33.9 G/DL (ref 31.4–35)
MCV RBC AUTO: 89.1 FL (ref 79.6–97.8)
MONOCYTES # BLD: 1.1 K/UL (ref 0.1–1.3)
MONOCYTES NFR BLD: 16 % (ref 4–12)
NEUTS SEG # BLD: 5.4 K/UL (ref 1.7–8.2)
NEUTS SEG NFR BLD: 77 % (ref 43–78)
NRBC # BLD: 0 K/UL (ref 0–0.2)
PLATELET # BLD AUTO: 194 K/UL (ref 150–450)
PMV BLD AUTO: 9.1 FL (ref 9.4–12.3)
POTASSIUM SERPL-SCNC: 3.6 MMOL/L (ref 3.5–5.1)
PROT SERPL-MCNC: 7.8 G/DL (ref 6.3–8.2)
RBC # BLD AUTO: 4.11 M/UL (ref 4.23–5.6)
SODIUM SERPL-SCNC: 136 MMOL/L (ref 136–145)
WBC # BLD AUTO: 7 K/UL (ref 4.3–11.1)

## 2021-08-20 PROCEDURE — 80053 COMPREHEN METABOLIC PANEL: CPT

## 2021-08-20 PROCEDURE — 77336 RADIATION PHYSICS CONSULT: CPT

## 2021-08-20 PROCEDURE — 85025 COMPLETE CBC W/AUTO DIFF WBC: CPT

## 2021-08-20 PROCEDURE — 83735 ASSAY OF MAGNESIUM: CPT

## 2021-08-20 PROCEDURE — 36415 COLL VENOUS BLD VENIPUNCTURE: CPT

## 2021-08-20 PROCEDURE — 77386 HC IMRT TRMT DLVR COMPL: CPT

## 2021-08-20 NOTE — PROGRESS NOTES
Patient: Summer Ordonez MRN: [id number]  SSN: [id number]    YOB: 1945  Age: 78 y.o. Sex: male      DIAGNOSIS:  T2N1M0, stage I, p16 positive SCC of the base of tongue    TREATMENT SITE:  Head and neck    DOSE and FRACTIONATION:  5 of 35 fractions; 1000 of 6000cGy    INTERVAL HISTORY:  Summer Ordonez is a 78 y.o. 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 Week 1:  D1C1 cisplatin 8/16/21, also received Covid booster. Mild nausea relieved with zofran/ compazine. No pain. OBJECTIVE:  NAD  Visit Vitals  BP (!) 152/88 (BP 1 Location: Left upper arm, BP Patient Position: Sitting)   Pulse 85   Temp 98.1 °F (36.7 °C)   Wt 86 kg (189 lb 8 oz)   SpO2 98%   BMI 24.66 kg/m²       Lab Results   Component Value Date/Time    Sodium 139 08/13/2021 12:43 PM    Potassium 3.9 08/13/2021 12:43 PM    Chloride 108 (H) 08/13/2021 12:43 PM    CO2 28 08/13/2021 12:43 PM    Anion gap 3 (L) 08/13/2021 12:43 PM    Glucose 85 08/13/2021 12:43 PM    BUN 13 08/13/2021 12:43 PM    Creatinine 0.90 08/13/2021 12:43 PM    GFR est AA >60 08/13/2021 12:43 PM    GFR est non-AA >60 08/13/2021 12:43 PM    Calcium 8.7 08/13/2021 12:43 PM    Magnesium 2.2 08/13/2021 12:43 PM    Albumin 3.9 08/13/2021 12:43 PM    Protein, total 7.8 08/13/2021 12:43 PM    Globulin 3.9 (H) 08/13/2021 12:43 PM    A-G Ratio 1.0 (L) 08/13/2021 12:43 PM    ALT (SGPT) 25 08/13/2021 12:43 PM     Lab Results   Component Value Date/Time    WBC 4.9 08/13/2021 12:43 PM    HGB 12.3 (L) 08/13/2021 12:43 PM    HCT 37.5 08/13/2021 12:43 PM    PLATELET 301 16/10/5657 12:43 PM       ASSESSMENT and PLAN:  Summer Ordonez is tolerating radiation as anticipated for the current dose and fraction. We will continue on as planned with another treatment visit anticipated next week.     - Continue Zofran and compazine as needed for pain  - Ativan as needed for anxiety  - Reviewed moisturizer for skin care and baking soda rinses for oral care    Richelle Lopez MD   August 20, 2021

## 2021-08-23 ENCOUNTER — APPOINTMENT (OUTPATIENT)
Dept: RADIATION ONCOLOGY | Age: 76
End: 2021-08-23
Payer: MEDICARE

## 2021-08-23 ENCOUNTER — HOSPITAL ENCOUNTER (OUTPATIENT)
Dept: INFUSION THERAPY | Age: 76
Discharge: HOME | End: 2021-08-23
Payer: MEDICARE

## 2021-08-23 ENCOUNTER — HOSPITAL ENCOUNTER (OUTPATIENT)
Dept: RADIATION ONCOLOGY | Age: 76
Discharge: HOME | End: 2021-08-23
Payer: MEDICARE

## 2021-08-23 VITALS
TEMPERATURE: 98 F | OXYGEN SATURATION: 97 % | HEART RATE: 92 BPM | RESPIRATION RATE: 16 BRPM | SYSTOLIC BLOOD PRESSURE: 140 MMHG | WEIGHT: 185.2 LBS | BODY MASS INDEX: 24.1 KG/M2 | DIASTOLIC BLOOD PRESSURE: 75 MMHG

## 2021-08-23 DIAGNOSIS — C01 SQUAMOUS CELL CARCINOMA OF BASE OF TONGUE (HCC): Primary | ICD-10-CM

## 2021-08-23 PROCEDURE — 77386 HC IMRT TRMT DLVR COMPL: CPT

## 2021-08-23 PROCEDURE — 96413 CHEMO IV INFUSION 1 HR: CPT

## 2021-08-23 PROCEDURE — 96375 TX/PRO/DX INJ NEW DRUG ADDON: CPT

## 2021-08-23 PROCEDURE — 96367 TX/PROPH/DG ADDL SEQ IV INF: CPT

## 2021-08-23 PROCEDURE — 74011000258 HC RX REV CODE- 258: Performed by: NURSE PRACTITIONER

## 2021-08-23 PROCEDURE — 74011250636 HC RX REV CODE- 250/636: Performed by: NURSE PRACTITIONER

## 2021-08-23 RX ORDER — DIPHENHYDRAMINE HYDROCHLORIDE 50 MG/ML
50 INJECTION, SOLUTION INTRAMUSCULAR; INTRAVENOUS AS NEEDED
Status: ACTIVE | OUTPATIENT
Start: 2021-08-23 | End: 2021-08-23

## 2021-08-23 RX ORDER — SODIUM CHLORIDE 0.9 % (FLUSH) 0.9 %
10 SYRINGE (ML) INJECTION AS NEEDED
Status: ACTIVE | OUTPATIENT
Start: 2021-08-23 | End: 2021-08-23

## 2021-08-23 RX ORDER — ONDANSETRON 2 MG/ML
8 INJECTION INTRAMUSCULAR; INTRAVENOUS ONCE
Status: COMPLETED | OUTPATIENT
Start: 2021-08-23 | End: 2021-08-23

## 2021-08-23 RX ORDER — SODIUM CHLORIDE 9 MG/ML
25 INJECTION, SOLUTION INTRAVENOUS CONTINUOUS
Status: ACTIVE | OUTPATIENT
Start: 2021-08-23 | End: 2021-08-23

## 2021-08-23 RX ORDER — HYDROCORTISONE SODIUM SUCCINATE 100 MG/2ML
100 INJECTION, POWDER, FOR SOLUTION INTRAMUSCULAR; INTRAVENOUS AS NEEDED
Status: ACTIVE | OUTPATIENT
Start: 2021-08-23 | End: 2021-08-23

## 2021-08-23 RX ADMIN — CISPLATIN 84 MG: 1 INJECTION, SOLUTION INTRAVENOUS at 13:30

## 2021-08-23 RX ADMIN — SODIUM CHLORIDE 25 ML/HR: 9 INJECTION, SOLUTION INTRAVENOUS at 11:20

## 2021-08-23 RX ADMIN — POTASSIUM CHLORIDE: 2 INJECTION, SOLUTION, CONCENTRATE INTRAVENOUS at 11:45

## 2021-08-23 RX ADMIN — DEXAMETHASONE SODIUM PHOSPHATE 12 MG: 4 INJECTION, SOLUTION INTRAMUSCULAR; INTRAVENOUS at 12:00

## 2021-08-23 RX ADMIN — FOSAPREPITANT 150 MG: 150 INJECTION, POWDER, LYOPHILIZED, FOR SOLUTION INTRAVENOUS at 12:15

## 2021-08-23 RX ADMIN — Medication 10 ML: at 11:20

## 2021-08-23 RX ADMIN — ONDANSETRON 8 MG: 2 INJECTION INTRAMUSCULAR; INTRAVENOUS at 11:50

## 2021-08-23 RX ADMIN — Medication 10 ML: at 15:02

## 2021-08-23 NOTE — PROGRESS NOTES
Arrived to the [facility]. Assessment completed, labs reviewed. Cisplatin infusing. Any issues or concerns during appointment:No.  Patient aware of next infusion appointment on 08/30/21 @1330. Care of pt assumed by Sanjay Palomino RN.

## 2021-08-24 ENCOUNTER — HOSPITAL ENCOUNTER (OUTPATIENT)
Dept: RADIATION ONCOLOGY | Age: 76
Discharge: HOME | End: 2021-08-24
Payer: MEDICARE

## 2021-08-24 ENCOUNTER — APPOINTMENT (OUTPATIENT)
Dept: RADIATION ONCOLOGY | Age: 76
End: 2021-08-24
Payer: MEDICARE

## 2021-08-24 PROCEDURE — 77386 HC IMRT TRMT DLVR COMPL: CPT

## 2021-08-25 ENCOUNTER — APPOINTMENT (OUTPATIENT)
Dept: RADIATION ONCOLOGY | Age: 76
End: 2021-08-25
Payer: MEDICARE

## 2021-08-25 ENCOUNTER — HOSPITAL ENCOUNTER (OUTPATIENT)
Dept: RADIATION ONCOLOGY | Age: 76
Discharge: HOME | End: 2021-08-25
Payer: MEDICARE

## 2021-08-25 PROCEDURE — 77386 HC IMRT TRMT DLVR COMPL: CPT

## 2021-08-26 ENCOUNTER — HOSPITAL ENCOUNTER (OUTPATIENT)
Dept: RADIATION ONCOLOGY | Age: 76
Discharge: HOME | End: 2021-08-26
Payer: MEDICARE

## 2021-08-26 ENCOUNTER — APPOINTMENT (OUTPATIENT)
Dept: RADIATION ONCOLOGY | Age: 76
End: 2021-08-26
Payer: MEDICARE

## 2021-08-26 PROCEDURE — 77386 HC IMRT TRMT DLVR COMPL: CPT

## 2021-08-27 ENCOUNTER — HOSPITAL ENCOUNTER (OUTPATIENT)
Dept: PHYSICAL THERAPY | Age: 76
Discharge: HOME | End: 2021-08-27
Attending: INTERNAL MEDICINE
Payer: MEDICARE

## 2021-08-27 ENCOUNTER — HOSPITAL ENCOUNTER (OUTPATIENT)
Dept: LAB | Age: 76
Discharge: HOME | End: 2021-08-27
Payer: MEDICARE

## 2021-08-27 ENCOUNTER — HOSPITAL ENCOUNTER (OUTPATIENT)
Dept: RADIATION ONCOLOGY | Age: 76
Discharge: HOME | End: 2021-08-27
Payer: MEDICARE

## 2021-08-27 ENCOUNTER — APPOINTMENT (OUTPATIENT)
Dept: RADIATION ONCOLOGY | Age: 76
End: 2021-08-27
Payer: MEDICARE

## 2021-08-27 VITALS
OXYGEN SATURATION: 97 % | DIASTOLIC BLOOD PRESSURE: 85 MMHG | SYSTOLIC BLOOD PRESSURE: 161 MMHG | HEART RATE: 82 BPM | RESPIRATION RATE: 16 BRPM | BODY MASS INDEX: 24.05 KG/M2 | TEMPERATURE: 98.2 F | WEIGHT: 184.8 LBS

## 2021-08-27 DIAGNOSIS — C01 SQUAMOUS CELL CARCINOMA OF BASE OF TONGUE (HCC): ICD-10-CM

## 2021-08-27 LAB
ALBUMIN SERPL-MCNC: 3.4 G/DL (ref 3.2–4.6)
ALBUMIN/GLOB SERPL: 0.9 {RATIO} (ref 1.2–3.5)
ALP SERPL-CCNC: 65 U/L (ref 50–136)
ALT SERPL-CCNC: 43 U/L (ref 12–65)
ANION GAP SERPL CALC-SCNC: 9 MMOL/L (ref 7–16)
AST SERPL-CCNC: 28 U/L (ref 15–37)
BASOPHILS # BLD: 0 K/UL (ref 0–0.2)
BASOPHILS NFR BLD: 0 % (ref 0–2)
BILIRUB SERPL-MCNC: 0.9 MG/DL (ref 0.2–1.1)
BUN SERPL-MCNC: 18 MG/DL (ref 8–23)
CALCIUM SERPL-MCNC: 9 MG/DL (ref 8.3–10.4)
CHLORIDE SERPL-SCNC: 102 MMOL/L (ref 98–107)
CO2 SERPL-SCNC: 28 MMOL/L (ref 21–32)
CREAT SERPL-MCNC: 1 MG/DL (ref 0.8–1.5)
DIFFERENTIAL METHOD BLD: ABNORMAL
EOSINOPHIL # BLD: 0 K/UL (ref 0–0.8)
EOSINOPHIL NFR BLD: 0 % (ref 0.5–7.8)
ERYTHROCYTE [DISTWIDTH] IN BLOOD BY AUTOMATED COUNT: 11.9 % (ref 11.9–14.6)
GLOBULIN SER CALC-MCNC: 3.8 G/DL (ref 2.3–3.5)
GLUCOSE SERPL-MCNC: 84 MG/DL (ref 65–100)
HCT VFR BLD AUTO: 34.7 %
HGB BLD-MCNC: 11.5 G/DL (ref 13.6–17.2)
IMM GRANULOCYTES # BLD AUTO: 0 K/UL (ref 0–0.5)
IMM GRANULOCYTES NFR BLD AUTO: 0 % (ref 0–5)
LYMPHOCYTES # BLD: 0.4 K/UL (ref 0.5–4.6)
LYMPHOCYTES NFR BLD: 6 % (ref 13–44)
MAGNESIUM SERPL-MCNC: 2 MG/DL (ref 1.8–2.4)
MCH RBC QN AUTO: 29.8 PG (ref 26.1–32.9)
MCHC RBC AUTO-ENTMCNC: 33.1 G/DL (ref 31.4–35)
MCV RBC AUTO: 89.9 FL (ref 79.6–97.8)
MONOCYTES # BLD: 0.9 K/UL (ref 0.1–1.3)
MONOCYTES NFR BLD: 16 % (ref 4–12)
NEUTS SEG # BLD: 4.5 K/UL (ref 1.7–8.2)
NEUTS SEG NFR BLD: 77 % (ref 43–78)
NRBC # BLD: 0 K/UL (ref 0–0.2)
PLATELET # BLD AUTO: 200 K/UL (ref 150–450)
PMV BLD AUTO: 9.4 FL (ref 9.4–12.3)
POTASSIUM SERPL-SCNC: 3.4 MMOL/L (ref 3.5–5.1)
PROT SERPL-MCNC: 7.2 G/DL (ref 6.3–8.2)
RBC # BLD AUTO: 3.86 M/UL (ref 4.23–5.6)
SODIUM SERPL-SCNC: 139 MMOL/L (ref 136–145)
WBC # BLD AUTO: 5.8 K/UL (ref 4.3–11.1)

## 2021-08-27 PROCEDURE — 92526 ORAL FUNCTION THERAPY: CPT

## 2021-08-27 PROCEDURE — 80053 COMPREHEN METABOLIC PANEL: CPT

## 2021-08-27 PROCEDURE — 36415 COLL VENOUS BLD VENIPUNCTURE: CPT

## 2021-08-27 PROCEDURE — 77386 HC IMRT TRMT DLVR COMPL: CPT

## 2021-08-27 PROCEDURE — 85025 COMPLETE CBC W/AUTO DIFF WBC: CPT

## 2021-08-27 PROCEDURE — 83735 ASSAY OF MAGNESIUM: CPT

## 2021-08-27 NOTE — PROGRESS NOTES
Patient: Elena Garvin MRN: [id number]  SSN: [id number]    YOB: 1945  Age: 78 y.o. Sex: male      DIAGNOSIS:  T2N1M0, stage I, p16 positive SCC of the base of tongue    TREATMENT SITE:  Head and neck    DOSE and FRACTIONATION:  10 of 35 fractions; 2000 of 6000cGy    INTERVAL HISTORY:  Elena Garvin is a 78 y.o. 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 Week 1:  D1C1 cisplatin 8/16/21, also received Covid booster. Mild nausea relieved with zofran/ compazine. No pain. Week 2: Tolerated second dose of cisplatin 8/23. Intermittent nausea and taste changes, supplementing nutrition with Ensure. Denies pain. OBJECTIVE:  Thick secretions, no thrush  Visit Vitals  BP (!) 161/85 (BP 1 Location: Left upper arm, BP Patient Position: Sitting)   Pulse 82   Temp 98.2 °F (36.8 °C)   Resp 16   Wt 83.8 kg (184 lb 12.8 oz)   SpO2 97%   BMI 24.05 kg/m²       Lab Results   Component Value Date/Time    Sodium 136 08/20/2021 09:34 AM    Potassium 3.6 08/20/2021 09:34 AM    Chloride 102 08/20/2021 09:34 AM    CO2 28 08/20/2021 09:34 AM    Anion gap 6 (L) 08/20/2021 09:34 AM    Glucose 96 08/20/2021 09:34 AM    BUN 12 08/20/2021 09:34 AM    Creatinine 0.80 08/20/2021 09:34 AM    GFR est AA >60 08/20/2021 09:34 AM    GFR est non-AA >60 08/20/2021 09:34 AM    Calcium 8.6 08/20/2021 09:34 AM    Magnesium 1.9 08/20/2021 09:34 AM    Albumin 3.8 08/20/2021 09:34 AM    Protein, total 7.8 08/20/2021 09:34 AM    Globulin 4.0 (H) 08/20/2021 09:34 AM    A-G Ratio 1.0 (L) 08/20/2021 09:34 AM    ALT (SGPT) 27 08/20/2021 09:34 AM     Lab Results   Component Value Date/Time    WBC 7.0 08/20/2021 09:34 AM    HGB 12.4 (L) 08/20/2021 09:34 AM    HCT 36.6 08/20/2021 09:34 AM    PLATELET 604 21/18/5251 09:34 AM       ASSESSMENT and PLAN:  Elena Garvin is tolerating radiation as anticipated for the current dose and fraction.   We will continue on as planned with another treatment visit anticipated next week.    - Continue Zofran and compazine as needed for pain  - Ativan as needed for anxiety  - Reviewed moisturizer for skin care and baking soda rinses for oral care  - No pain currently  - Constipation controlled with OTC stool softeners    Oscar Donahue MD   August 27, 2021

## 2021-08-27 NOTE — PROGRESS NOTES
Outpatient Rehab Services  Referral Form/Physician Order  Central Scheduling Fax: [phone]  Speak to a :  Call [phone]   Please review and co-sign (electronically) OR sign and return to the below indicated clinic's fax number if you agree with this request.  Thank you! NAME:  Randall Greene SSN:  [id number] :  1945   ADDRESS:  [address] [place] [address] Daytime Phone:  [phone] (VSNR)[phone] (mobile)    Diagnosis & Date of Onset:  Squamous cell carcinoma of base of tongue ([facility] [address] [facility] [C01]   Dysphagia, pharyngeal R 13.13 Special Precautions:   Frequency:  [x] to be determined by therapist after evaluation   OR   ____ X per week X ____ weeks    Services    [] Evaluate & Treat  [] Special Orders________________________   [] Physical Therapy  [] Occupational Therapy   [] Speech Therapy  [x] MBS w/ Speech Therapy    Specialized Programs    [] Aquatic Therapy [] Lymphedema [] Oncology Rehab   [] Balance Rehab [] Parkinson's Program [] Osteoporosis   [] Fibromyalgia [] Motion Analysis [] Spine Rehab   [] Industrial Rehab [] Hand & Upper Extremity [] Sports Injury Rehab    [] Urinary Incontinence     Locations    @ [address] [place] [address] [address], [address]  [facility] [address] [facility]  Ph: [phone]  Fax: [phone] @ [address] [place] [address] [address] [facility]  [place], [address]  Ph: [phone]  Fax: [phone] @ [facility], [address] [place] [address] [address] W[address]ins, [address] [place] [address]  Ph: [phone]  Fax: [phone]        @ [address] [facility] [address]  Ph: [phone]  Fax: [phone] @ [address] [place] [address], [facility] [place]  [place], [address]   Ph: [phone]  Fax: [phone] @ [address]  [address]  [place], [address]  Ph: [phone]  Fax: [phone]        @ [address]  Ph: [phone]  Fax: [phone] @ [address] [place] [address]  Ph: [phone]  Fax: [phone] @ [address] [place] [address], [address], [address]   Ph: [phone]         @ [address] [place] [address], [address] [place] [address]  Ph: [phone]  Fax: [phone]      This section is not needed if signing electronically   I certify that I have examined the above patient and outpatient rehab services are medically necessary.    ___________________________________________           Signature of Physician/Provider    ___________________________________________            Practice Name   ______________________   Date    ______________________   Referral Contact

## 2021-08-27 NOTE — PROGRESS NOTES
Elena Garvin  : 1945  Primary: [employer]  Secondary:  [employer]  at [facility]  [address], [address], [place], [facility]  [phone]   [id number]        OUTPATIENT SPEECH LANGUAGE PATHOLOGY: Daily Note: 1  ICD-10: Treatment Diagnosis: dysphagia, pharyngeal R 13.13  REFERRING PHYSICIAN: Chhaya Quarles MD MD Orders: speech evaluate and treat   PAST MEDICAL HISTORY:    Mr. Odis Goldmann is a 78 y.o. male who  has a past medical history of Arthritis, Cancer of base of tongue ([address] High cholesterol, and Thyroid disease. He also has no past medical history of Chronic pain, Malignant hyperthermia due to anesthesia, or Pseudocholinesterase deficiency. He also  has a past surgical history that includes hx colonoscopy (10/26/2015); hx colonoscopy (10/26/2015); and hx heent (Right, 2021). MEDICAL/REFERRING DIAGNOSIS: Squamous cell carcinoma of base of tongue ([facility] [address] [C01]  DATE OF ONSET: 2021   PRIOR LEVEL OF FUNCTION: with spouse  PRECAUTIONS/ALLERGIES: Patient has no known allergies. ASSESSMENT:  Pt reported he is mainly consuming Ensures and yogurt. He eats chicken on occasion. He reported no problems with swallowing but decreased appetite. He stated, \"nothing has any flavor anymore\". He reported his swallowing exercises are going well. Patient will benefit from skilled intervention to address the below impairments. ?????? ? ? This section established at most recent assessment??????????  PROBLEM LIST (Impairments causing functional limitations):  1. Dysphagia   GOALS: (Goals have been discussed and agreed upon with patient.)  SHORT-TERM FUNCTIONAL GOALS: Time Frame: 3 months   Pt will complete laryngeal exercises with 80% accuracy to ensure maintenance of swallow function.   Pt will complete tongue base exercises with 80% accuracy to ensure maintenance of swallow function  Pt will complete exercises a min of 5 days weekly to ensure maintenance of swallow function. Pt will use compensatory as indicated to ensure safe, adequate consumption of a po diet with only min cues required. Pt will participate in a MBS x1 to further assess swallow function. DISCHARGE GOALS: Time Frame: 4-5 months   1. Pt will tolerate least restrictive diet without signs/sx aspiration 100% for safe swallow function. REHABILITATION POTENTIAL FOR STATED GOALS: GoodPLAN OF CARE:  Patient will benefit from skilled intervention to address the following impairments. RECOMMENDATIONS AND PLANNED INTERVENTIONS (Benefits and precautions of therapy have been discussed with the patient.):  · continue prescribed diet  · will modify diet as needed during course of chemo/radiation  MEDICATIONS:  · With liquid  COMPENSATORY STRATEGIES/MODIFICATIONS INCLUDING:  · None at this time  · Will modify strategies as needed during course of radiation  OTHER RECOMMENDATIONS (including follow up treatment recommendations):   · Laryngeal exercises  · Patient education  RECOMMENDED DIET MODIFICATIONS DISCUSSED WITH:  · Patient  TREATMENT PLAN EFFECTIVE DATES: 8/17/2021 TO 11/15/2021 (90 days). FREQUENCY/DURATION: Continue to follow patient 1 time a week for 90 days to address above goals. Regarding Janey Goldman's therapy, I certify that the treatment plan above will be carried out by a therapist or under their direction. Thank you for this referral,  Kia Dela Cruz, [facility] MEDICO [facility], [facility], [address] [facility]                    Referring Physician Signature: Maddie Hopson MD    Date      SUBJECTIVE:  Pt cooperative. Present Symptoms: recent dx of base of tongue cancer      Current Medications:   Current Outpatient Medications:     triamcinolone acetonide (KENALOG) 0.025 % topical cream, Apply  to affected area two (2) times a day. use thin layer, Disp: 15 g, Rfl: 0    LORazepam (ATIVAN) 0.5 mg tablet, Take 2 Tablets by mouth daily as needed for Anxiety (take 1-2 pills as needed for anxiety 30 min prior to radiation). , Disp: 30 Tablet, Rfl: 0    CHOLESTEROL PO, Take  by mouth., Disp: , Rfl:     ondansetron (ZOFRAN ODT) 8 mg disintegrating tablet, Take 1 Tablet by mouth every eight (8) hours as needed for Nausea or Vomiting. Indications: prevent nausea and vomiting from cancer chemotherapy, Disp: 90 Tablet, Rfl: 1    prochlorperazine (Compazine) 10 mg tablet, Take 1 Tablet by mouth every six (6) hours as needed for Nausea or Vomiting. Indications: prevent nausea and vomiting from cancer chemotherapy, Disp: 90 Tablet, Rfl: 1    celecoxib (CELEBREX) 200 mg capsule, Take 1 Capsule by mouth daily. (Patient taking differently: Take 200 mg by mouth daily as needed.), Disp: 30 Capsule, Rfl: 5    levothyroxine (SYNTHROID) 75 mcg tablet, Take 1 Tab by mouth Daily (before breakfast). , Disp: 30 Tab, Rfl: 5    acetaminophen (TYLENOL) 650 mg CR tablet, Take 650 mg by mouth daily. , Disp: , Rfl:     coenzyme q10 (CO Q-10) 10 mg cap, Take  by mouth., Disp: , Rfl:     multivitamin (ONE A DAY) tablet, Take 1 Tab by mouth daily. , Disp: , Rfl:    Date Last Reviewed: 8/27/21  Current Dietary Status:  Regular       History of reflux:  NO    Reflux medication:   Social History/Home Situation: with spouse      Work/Activity History: retired    OBJECTIVE:  Objective Measure: Tool Used: National Outcomes Measurement System: Functional Communication Measures: SWALLOWING  Score:  Initial: 6 Most Recent: X (Date: -- )   Interpretation of Tool: This measure describes the change in functional communication status subsequent to speech-language pathology treatment of patients with dysphagia.  o Level 1:  Individual is not able to swallow anything safely by mouth. All nutrition and hydration is received through non-oral means (e.g., nasogastric tube, PEG). o Level 2: Individual is not able to swallow safely by mouth for nutrition and hydration, but may take some consistency with consistent maximal cues in therapy only.  Alternative method of feeding required. o Level 3:  Alternative method of feeding required as individual takes less than 50% of nutrition and hydration by mouth, and/or swallowing is safe with consistent use of moderate cues to use compensatory strategies and/or requires maximum diet restriction. o Level 4:  Swallowing is safe, but usually requires moderate cues to use compensatory strategies, and/or the individual has moderate diet restrictions and/or still requires tube feeding and/or oral supplements. o Level 5:  Swallowing is safe with minimal diet restriction and/or occasionally requires minimal cueing to use compensatory strategies. The individual may occasionally self-cue. All nutrition and hydration needs are met by mouth at mealtime. o Level 6:  Swallowing is safe, and the individual eats and drinks independently and may rarely require minimal cueing. The individual usually self-cues when difficulty occurs. May need to avoid specific food items (e.g., popcorn and nuts), or require additional time (due to dysphagia). o Level 7: The individuals ability to eat independently is not limited by swallow function. Swallowing would be safe and efficient for all consistencies. Compensatory strategies are effectively used when needed. Score Level 7 Level 6 Level 5 Level 4 Level 3 Level 2 Level 1   Modifier CH CI CJ CK CL CM CN       Cognitive and Communication Status:  Alert        TREATMENT:    (In addition to Assessment/Re-Assessment sessions the following treatments were rendered)  Dysphagia Activities: Activities/Procedures listed utilized to improve progress in swallow strength and swallow function. Required minimal cueing to improve swallow safety. LARYNGEAL / PHARYNGEAL EXERCISES:           Effortful Swallow: Yes  Reps : 10  Sets : 1                                Rylie:  Yes  Reps : 10  Sets : 1  Mendelsohn Maneuver: Yes  Reps : 10  Sets : 1 Open Mouth Wide/Yawn: Yes  Reps : 10  Sets : 1  Shaker: Yes  Reps :  (3 sets of 30 seconds)  Sets : 3  Sing \"EEE\": Yes  Reps : 10  Sets : 1                             Tongue Back & Hold: Yes  Reps :  (unble to complete without curling)  Sets : 1     __________________________________________________________________________________________________  Treatment Assessment:    Progression/Medical Necessity:   · Skilled intervention continues to be required due to medical complications. Compliance with Program/Exercises: Will assess as treatment progresses. Reason for Continuation of Services/Other Comments:  · Patient continues to require skilled intervention due to medical complications. Recommendations/Intent for next treatment session: \"Treatment next visit will focus on laryngeal exercises\".     Total Treatment Duration:  Time In: 0805  Time Out: 0845    HELDER EdmondsO NARCISO [facility], [facility] DAWIT GRANADOS, [facility]    Visit Approval Visit # Therapist initials Date A NS / Cx < 24 hr >24 hr Cx Comments    1 RL 8/17 [x]  [] [] Initial evaluation    2 RL 8/27 [x] [] [] Tx       [] [] []        [] [] []        [] [] []        [] [] []        [] [] []        [] [] []        [] [] []        [] [] []        [] [] []        [] [] []        [] [] []        [] [] []        [] [] []        [] [] []        [] [] []        [] [] []

## 2021-08-30 ENCOUNTER — APPOINTMENT (OUTPATIENT)
Dept: RADIATION ONCOLOGY | Age: 76
End: 2021-08-30
Payer: MEDICARE

## 2021-08-30 ENCOUNTER — HOSPITAL ENCOUNTER (OUTPATIENT)
Dept: RADIATION ONCOLOGY | Age: 76
Discharge: HOME | End: 2021-08-30
Payer: MEDICARE

## 2021-08-30 ENCOUNTER — HOSPITAL ENCOUNTER (OUTPATIENT)
Dept: INFUSION THERAPY | Age: 76
Discharge: HOME | End: 2021-08-30
Payer: MEDICARE

## 2021-08-30 VITALS
HEART RATE: 92 BPM | BODY MASS INDEX: 23.43 KG/M2 | DIASTOLIC BLOOD PRESSURE: 72 MMHG | SYSTOLIC BLOOD PRESSURE: 116 MMHG | RESPIRATION RATE: 16 BRPM | WEIGHT: 180 LBS | OXYGEN SATURATION: 95 % | TEMPERATURE: 98 F

## 2021-08-30 DIAGNOSIS — R21 RASH: Primary | ICD-10-CM

## 2021-08-30 DIAGNOSIS — C01 SQUAMOUS CELL CARCINOMA OF BASE OF TONGUE (HCC): ICD-10-CM

## 2021-08-30 PROCEDURE — 74011000258 HC RX REV CODE- 258: Performed by: INTERNAL MEDICINE

## 2021-08-30 PROCEDURE — 96367 TX/PROPH/DG ADDL SEQ IV INF: CPT

## 2021-08-30 PROCEDURE — 96413 CHEMO IV INFUSION 1 HR: CPT

## 2021-08-30 PROCEDURE — 77386 HC IMRT TRMT DLVR COMPL: CPT

## 2021-08-30 PROCEDURE — 96375 TX/PRO/DX INJ NEW DRUG ADDON: CPT

## 2021-08-30 PROCEDURE — 74011250636 HC RX REV CODE- 250/636: Performed by: INTERNAL MEDICINE

## 2021-08-30 PROCEDURE — 77336 RADIATION PHYSICS CONSULT: CPT

## 2021-08-30 RX ORDER — SODIUM CHLORIDE 9 MG/ML
25 INJECTION, SOLUTION INTRAVENOUS CONTINUOUS
Status: DISCONTINUED | OUTPATIENT
Start: 2021-08-30 | End: 2021-08-31 | Stop reason: HOSPADM

## 2021-08-30 RX ORDER — ONDANSETRON 2 MG/ML
8 INJECTION INTRAMUSCULAR; INTRAVENOUS ONCE
Status: COMPLETED | OUTPATIENT
Start: 2021-08-30 | End: 2021-08-30

## 2021-08-30 RX ADMIN — POTASSIUM CHLORIDE: 2 INJECTION, SOLUTION, CONCENTRATE INTRAVENOUS at 14:55

## 2021-08-30 RX ADMIN — SODIUM CHLORIDE 25 ML/HR: 900 INJECTION, SOLUTION INTRAVENOUS at 14:01

## 2021-08-30 RX ADMIN — FOSAPREPITANT 150 MG: 150 INJECTION, POWDER, LYOPHILIZED, FOR SOLUTION INTRAVENOUS at 14:17

## 2021-08-30 RX ADMIN — CISPLATIN 84 MG: 1 INJECTION, SOLUTION INTRAVENOUS at 16:10

## 2021-08-30 RX ADMIN — ONDANSETRON 8 MG: 2 INJECTION INTRAMUSCULAR; INTRAVENOUS at 14:16

## 2021-08-30 RX ADMIN — DEXAMETHASONE SODIUM PHOSPHATE 12 MG: 4 INJECTION, SOLUTION INTRAMUSCULAR; INTRAVENOUS at 14:40

## 2021-08-30 NOTE — PROGRESS NOTES
Arrived to the [facility]. Cisplatin completed. Patient tolerated well. Any issues or concerns during appointment: None. Patient aware of next infusion appointment on 9/6 (date) at 36 (time). Patient aware of next lab and [facility] office visit on 9/3 (date) at [phone] (time). Discharged ambulatory in stable conditiom.

## 2021-08-31 ENCOUNTER — APPOINTMENT (OUTPATIENT)
Dept: PHYSICAL THERAPY | Age: 76
End: 2021-08-31
Attending: INTERNAL MEDICINE
Payer: MEDICARE

## 2021-08-31 ENCOUNTER — APPOINTMENT (OUTPATIENT)
Dept: RADIATION ONCOLOGY | Age: 76
End: 2021-08-31
Payer: MEDICARE

## 2021-08-31 ENCOUNTER — HOSPITAL ENCOUNTER (OUTPATIENT)
Dept: RADIATION ONCOLOGY | Age: 76
Discharge: HOME | End: 2021-08-31
Payer: MEDICARE

## 2021-08-31 PROCEDURE — 77386 HC IMRT TRMT DLVR COMPL: CPT

## 2021-09-01 ENCOUNTER — HOSPITAL ENCOUNTER (OUTPATIENT)
Dept: RADIATION ONCOLOGY | Age: 76
Discharge: HOME | End: 2021-09-01
Payer: MEDICARE

## 2021-09-01 ENCOUNTER — APPOINTMENT (OUTPATIENT)
Dept: RADIATION ONCOLOGY | Age: 76
End: 2021-09-01

## 2021-09-01 PROCEDURE — 77386 HC IMRT TRMT DLVR COMPL: CPT

## 2021-09-02 ENCOUNTER — HOSPITAL ENCOUNTER (OUTPATIENT)
Dept: RADIATION ONCOLOGY | Age: 76
Discharge: HOME | End: 2021-09-02
Payer: MEDICARE

## 2021-09-02 ENCOUNTER — HOSPITAL ENCOUNTER (OUTPATIENT)
Dept: PHYSICAL THERAPY | Age: 76
Discharge: HOME | End: 2021-09-02
Attending: INTERNAL MEDICINE
Payer: MEDICARE

## 2021-09-02 ENCOUNTER — APPOINTMENT (OUTPATIENT)
Dept: RADIATION ONCOLOGY | Age: 76
End: 2021-09-02

## 2021-09-02 PROCEDURE — 92526 ORAL FUNCTION THERAPY: CPT

## 2021-09-02 PROCEDURE — 77386 HC IMRT TRMT DLVR COMPL: CPT

## 2021-09-02 NOTE — PROGRESS NOTES
Saman Sotelo  : 1945  Primary: [place] Medicare  Secondary:  [address]  at [address] [facility] [address], [address] [facility], [address]  [phone]   [phone]        OUTPATIENT SPEECH LANGUAGE PATHOLOGY: Daily Note: 2  ICD-10: Treatment Diagnosis: dysphagia, pharyngeal R 13.13  REFERRING PHYSICIAN: Arthur Desir MD MD Orders: speech evaluate and treat   PAST MEDICAL HISTORY:    Mr. Joe Tillman is a 78 y.o. male who  has a past medical history of Arthritis, Cancer of base of tongue ([address] High cholesterol, and Thyroid disease. He also has no past medical history of Chronic pain, Malignant hyperthermia due to anesthesia, or Pseudocholinesterase deficiency. He also  has a past surgical history that includes hx colonoscopy (10/26/2015); hx colonoscopy (10/26/2015); and hx heent (Right, 2021). MEDICAL/REFERRING DIAGNOSIS: Squamous cell carcinoma of base of tongue ([facility] [address] [C01]  DATE OF ONSET: 2021   PRIOR LEVEL OF FUNCTION: with spouse  PRECAUTIONS/ALLERGIES: Patient has no known allergies. ASSESSMENT:  Pt did well with laryngeal exercises this date. MBS planned for . He reported decreased taste sensation and decreased appetite. He reported having to force himself to swallow foods as a result. He stated having a salty taste at baseline now. He also reported an increase in thick secretions. He reported he is using his baking soda rinse daily. Patient will benefit from skilled intervention to address the below impairments. ?????? ? ? This section established at most recent assessment??????????  PROBLEM LIST (Impairments causing functional limitations):  1. Dysphagia   GOALS: (Goals have been discussed and agreed upon with patient.)  SHORT-TERM FUNCTIONAL GOALS: Time Frame: 3 months   Pt will complete laryngeal exercises with 80% accuracy to ensure maintenance of swallow function.   Pt will complete tongue base exercises with 80% accuracy to ensure maintenance of swallow function  Pt will complete exercises a min of 5 days weekly to ensure maintenance of swallow function. Pt will use compensatory as indicated to ensure safe, adequate consumption of a po diet with only min cues required. Pt will participate in a MBS x1 to further assess swallow function. DISCHARGE GOALS: Time Frame: 4-5 months   1. Pt will tolerate least restrictive diet without signs/sx aspiration 100% for safe swallow function. REHABILITATION POTENTIAL FOR STATED GOALS: GoodPLAN OF CARE:  Patient will benefit from skilled intervention to address the following impairments. RECOMMENDATIONS AND PLANNED INTERVENTIONS (Benefits and precautions of therapy have been discussed with the patient.):  · continue prescribed diet  · will modify diet as needed during course of chemo/radiation  MEDICATIONS:  · With liquid  COMPENSATORY STRATEGIES/MODIFICATIONS INCLUDING:  · None at this time  · Will modify strategies as needed during course of radiation  OTHER RECOMMENDATIONS (including follow up treatment recommendations):   · Laryngeal exercises  · Patient education  RECOMMENDED DIET MODIFICATIONS DISCUSSED WITH:  · Patient  TREATMENT PLAN EFFECTIVE DATES: 8/17/2021 TO 11/15/2021 (90 days). FREQUENCY/DURATION: Continue to follow patient 1 time a week for 90 days to address above goals. Regarding Mariza Lombardit's therapy, I certify that the treatment plan above will be carried out by a therapist or under their direction. Thank you for this referral,  Zeynep Otto, [facility] MEDICO [facility], [facility], [address] [facility], [place]                    Referring Physician Signature: Kylee Zhu MD    Date      SUBJECTIVE:  Pt cooperative. Present Symptoms: recent dx of base of tongue cancer      Current Medications:   Current Outpatient Medications:     triamcinolone acetonide (KENALOG) 0.025 % topical cream, Apply  to affected area two (2) times a day.  use thin layer, Disp: 15 g, Rfl: 0    LORazepam (ATIVAN) 0.5 mg tablet, Take 2 Tablets by mouth daily as needed for Anxiety (take 1-2 pills as needed for anxiety 30 min prior to radiation). , Disp: 30 Tablet, Rfl: 0    CHOLESTEROL PO, Take  by mouth., Disp: , Rfl:     ondansetron (ZOFRAN ODT) 8 mg disintegrating tablet, Take 1 Tablet by mouth every eight (8) hours as needed for Nausea or Vomiting. Indications: prevent nausea and vomiting from cancer chemotherapy, Disp: 90 Tablet, Rfl: 1    prochlorperazine (Compazine) 10 mg tablet, Take 1 Tablet by mouth every six (6) hours as needed for Nausea or Vomiting. Indications: prevent nausea and vomiting from cancer chemotherapy, Disp: 90 Tablet, Rfl: 1    celecoxib (CELEBREX) 200 mg capsule, Take 1 Capsule by mouth daily. (Patient taking differently: Take 200 mg by mouth daily as needed.), Disp: 30 Capsule, Rfl: 5    levothyroxine (SYNTHROID) 75 mcg tablet, Take 1 Tab by mouth Daily (before breakfast). , Disp: 30 Tab, Rfl: 5    acetaminophen (TYLENOL) 650 mg CR tablet, Take 650 mg by mouth daily. , Disp: , Rfl:     coenzyme q10 (CO Q-10) 10 mg cap, Take  by mouth., Disp: , Rfl:     multivitamin (ONE A DAY) tablet, Take 1 Tab by mouth daily. , Disp: , Rfl:    Date Last Reviewed: 8/27/21  Current Dietary Status:  Regular       History of reflux:  NO    Reflux medication:   Social History/Home Situation: with spouse      Work/Activity History: retired    OBJECTIVE:  Objective Measure: Tool Used: National Outcomes Measurement System: Functional Communication Measures: SWALLOWING  Score:  Initial: 6 Most Recent: X (Date: -- )   Interpretation of Tool: This measure describes the change in functional communication status subsequent to speech-language pathology treatment of patients with dysphagia.  o Level 1:  Individual is not able to swallow anything safely by mouth. All nutrition and hydration is received through non-oral means (e.g., nasogastric tube, PEG). o Level 2:   Individual is not able to swallow safely by mouth for nutrition and hydration, but may take some consistency with consistent maximal cues in therapy only. Alternative method of feeding required. o Level 3:  Alternative method of feeding required as individual takes less than 50% of nutrition and hydration by mouth, and/or swallowing is safe with consistent use of moderate cues to use compensatory strategies and/or requires maximum diet restriction. o Level 4:  Swallowing is safe, but usually requires moderate cues to use compensatory strategies, and/or the individual has moderate diet restrictions and/or still requires tube feeding and/or oral supplements. o Level 5:  Swallowing is safe with minimal diet restriction and/or occasionally requires minimal cueing to use compensatory strategies. The individual may occasionally self-cue. All nutrition and hydration needs are met by mouth at mealtime. o Level 6:  Swallowing is safe, and the individual eats and drinks independently and may rarely require minimal cueing. The individual usually self-cues when difficulty occurs. May need to avoid specific food items (e.g., popcorn and nuts), or require additional time (due to dysphagia). o Level 7: The individuals ability to eat independently is not limited by swallow function. Swallowing would be safe and efficient for all consistencies. Compensatory strategies are effectively used when needed. Score Level 7 Level 6 Level 5 Level 4 Level 3 Level 2 Level 1   Modifier CH CI CJ CK CL CM CN       Cognitive and Communication Status:  Alert        TREATMENT:    (In addition to Assessment/Re-Assessment sessions the following treatments were rendered)  Dysphagia Activities: Activities/Procedures listed utilized to improve progress in swallow strength and swallow function. Required minimal cueing to improve swallow safety. LARYNGEAL / PHARYNGEAL EXERCISES:           Effortful Swallow: Yes  Reps : 10  Sets : 1                                Rylie:  Yes  Reps : 10  Sets : 1  Mendelsohn Maneuver: Yes  Reps : 10  Sets : 1 Open Mouth Wide/Yawn: Yes  Reps : 10  Sets : 1  Shaker: Yes  Reps :  (3 sets of CTAR)  Sets : 3  Sing \"EEE\": Yes  Reps : 10  Sets : 1                                         __________________________________________________________________________________________________  Treatment Assessment:    Progression/Medical Necessity:   · Skilled intervention continues to be required due to medical complications. Compliance with Program/Exercises: Will assess as treatment progresses. Reason for Continuation of Services/Other Comments:  · Patient continues to require skilled intervention due to medical complications. Recommendations/Intent for next treatment session: \"Treatment next visit will focus on laryngeal exercises\".     Total Treatment Duration:  Time In: 0815  Time Out: Kole Carpenter, [facility] [place] MEDICO [facility], [facility] DAWIT GRANADOS, [facility]    Visit Approval Visit # Therapist initials Date A NS / Cx < 24 hr >24 hr Cx Comments    1 RL 8/17 [x]  [] [] Initial evaluation    2 RL 8/27 [x] [] [] Tx    3 RL 9/2 [x] [] [] Tx       [] [] []        [] [] []        [] [] []        [] [] []        [] [] []        [] [] []        [] [] []        [] [] []        [] [] []        [] [] []        [] [] []        [] [] []        [] [] []        [] [] []        [] [] []

## 2021-09-03 ENCOUNTER — HOSPITAL ENCOUNTER (OUTPATIENT)
Dept: LAB | Age: 76
Discharge: HOME | End: 2021-09-03
Payer: COMMERCIAL

## 2021-09-03 ENCOUNTER — HOSPITAL ENCOUNTER (OUTPATIENT)
Dept: RADIATION ONCOLOGY | Age: 76
Discharge: HOME | End: 2021-09-03
Payer: MEDICARE

## 2021-09-03 ENCOUNTER — APPOINTMENT (OUTPATIENT)
Dept: RADIATION ONCOLOGY | Age: 76
End: 2021-09-03

## 2021-09-03 VITALS
RESPIRATION RATE: 16 BRPM | BODY MASS INDEX: 23.35 KG/M2 | DIASTOLIC BLOOD PRESSURE: 80 MMHG | HEART RATE: 84 BPM | SYSTOLIC BLOOD PRESSURE: 163 MMHG | WEIGHT: 179.4 LBS | OXYGEN SATURATION: 99 % | TEMPERATURE: 98.6 F

## 2021-09-03 DIAGNOSIS — C01 SQUAMOUS CELL CARCINOMA OF BASE OF TONGUE (HCC): ICD-10-CM

## 2021-09-03 LAB
ALBUMIN SERPL-MCNC: 3.3 G/DL (ref 3.2–4.6)
ALBUMIN/GLOB SERPL: 0.9 {RATIO} (ref 1.2–3.5)
ALP SERPL-CCNC: 68 U/L (ref 50–136)
ALT SERPL-CCNC: 47 U/L (ref 12–65)
ANION GAP SERPL CALC-SCNC: 5 MMOL/L (ref 7–16)
AST SERPL-CCNC: 33 U/L (ref 15–37)
BASOPHILS # BLD: 0 K/UL (ref 0–0.2)
BASOPHILS NFR BLD: 1 % (ref 0–2)
BILIRUB SERPL-MCNC: 0.8 MG/DL (ref 0.2–1.1)
BUN SERPL-MCNC: 18 MG/DL (ref 8–23)
CALCIUM SERPL-MCNC: 9 MG/DL (ref 8.3–10.4)
CHLORIDE SERPL-SCNC: 103 MMOL/L (ref 98–107)
CO2 SERPL-SCNC: 28 MMOL/L (ref 21–32)
CREAT SERPL-MCNC: 1 MG/DL (ref 0.8–1.5)
DIFFERENTIAL METHOD BLD: ABNORMAL
EOSINOPHIL # BLD: 0.1 K/UL (ref 0–0.8)
EOSINOPHIL NFR BLD: 1 % (ref 0.5–7.8)
ERYTHROCYTE [DISTWIDTH] IN BLOOD BY AUTOMATED COUNT: 12.1 % (ref 11.9–14.6)
GLOBULIN SER CALC-MCNC: 3.7 G/DL (ref 2.3–3.5)
GLUCOSE SERPL-MCNC: 94 MG/DL (ref 65–100)
HCT VFR BLD AUTO: 32.8 %
HGB BLD-MCNC: 11.2 G/DL (ref 13.6–17.2)
IMM GRANULOCYTES # BLD AUTO: 0 K/UL (ref 0–0.5)
IMM GRANULOCYTES NFR BLD AUTO: 1 % (ref 0–5)
LYMPHOCYTES # BLD: 0.2 K/UL (ref 0.5–4.6)
LYMPHOCYTES NFR BLD: 6 % (ref 13–44)
MAGNESIUM SERPL-MCNC: 1.9 MG/DL (ref 1.8–2.4)
MCH RBC QN AUTO: 30 PG (ref 26.1–32.9)
MCHC RBC AUTO-ENTMCNC: 34.1 G/DL (ref 31.4–35)
MCV RBC AUTO: 87.9 FL (ref 79.6–97.8)
MONOCYTES # BLD: 0.7 K/UL (ref 0.1–1.3)
MONOCYTES NFR BLD: 17 % (ref 4–12)
NEUTS SEG # BLD: 3.2 K/UL (ref 1.7–8.2)
NEUTS SEG NFR BLD: 75 % (ref 43–78)
NRBC # BLD: 0 K/UL (ref 0–0.2)
PLATELET # BLD AUTO: 186 K/UL (ref 150–450)
PMV BLD AUTO: 9.1 FL (ref 9.4–12.3)
POTASSIUM SERPL-SCNC: 3.9 MMOL/L (ref 3.5–5.1)
PROT SERPL-MCNC: 7 G/DL (ref 6.3–8.2)
RBC # BLD AUTO: 3.73 M/UL (ref 4.23–5.6)
SODIUM SERPL-SCNC: 136 MMOL/L (ref 136–145)
WBC # BLD AUTO: 4.2 K/UL (ref 4.3–11.1)

## 2021-09-03 PROCEDURE — 77386 HC IMRT TRMT DLVR COMPL: CPT

## 2021-09-03 PROCEDURE — 77336 RADIATION PHYSICS CONSULT: CPT

## 2021-09-03 PROCEDURE — 85025 COMPLETE CBC W/AUTO DIFF WBC: CPT

## 2021-09-03 PROCEDURE — 83735 ASSAY OF MAGNESIUM: CPT

## 2021-09-03 PROCEDURE — 80053 COMPREHEN METABOLIC PANEL: CPT

## 2021-09-03 PROCEDURE — 36415 COLL VENOUS BLD VENIPUNCTURE: CPT

## 2021-09-03 NOTE — PROGRESS NOTES
Patient: Shirley Hurtado MRN: [id number]  SSN: [id number]    YOB: 1945  Age: 78 y.o. Sex: male      DIAGNOSIS:  T2N1M0, stage I, p16 positive SCC of the base of tongue    TREATMENT SITE:  Head and neck    DOSE and FRACTIONATION:  15 of 35 fractions; 3000 of 6000cGy    INTERVAL HISTORY:  Shirley Hurtado is a 78 y.o. 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 Week 1:  D1C1 cisplatin 8/16/21, also received Covid booster. Mild nausea relieved with zofran/ compazine. No pain. Week 2: Tolerated second dose of cisplatin 8/23. Intermittent nausea and taste changes, supplementing nutrition with Ensure. Denies pain. Week 3: no complaints    OBJECTIVE:  NAD; no dermatitis  Visit Vitals  BP (!) 163/80 (BP 1 Location: Left upper arm, BP Patient Position: Sitting)   Pulse 84   Temp 98.6 °F (37 °C)   Resp 16   Wt 81.4 kg (179 lb 6.4 oz)   SpO2 99%   BMI 23.35 kg/m²       Lab Results   Component Value Date/Time    Sodium 136 09/03/2021 10:20 AM    Potassium 3.9 09/03/2021 10:20 AM    Chloride 103 09/03/2021 10:20 AM    CO2 28 09/03/2021 10:20 AM    Anion gap 5 (L) 09/03/2021 10:20 AM    Glucose 94 09/03/2021 10:20 AM    BUN 18 09/03/2021 10:20 AM    Creatinine 1.00 09/03/2021 10:20 AM    GFR est AA >60 09/03/2021 10:20 AM    GFR est non-AA >60 09/03/2021 10:20 AM    Calcium 9.0 09/03/2021 10:20 AM    Magnesium 1.9 09/03/2021 10:20 AM    Albumin 3.3 09/03/2021 10:20 AM    Protein, total 7.0 09/03/2021 10:20 AM    Globulin 3.7 (H) 09/03/2021 10:20 AM    A-G Ratio 0.9 (L) 09/03/2021 10:20 AM    ALT (SGPT) 47 09/03/2021 10:20 AM     Lab Results   Component Value Date/Time    WBC 4.2 (L) 09/03/2021 10:20 AM    HGB 11.2 (L) 09/03/2021 10:20 AM    HCT 32.8 09/03/2021 10:20 AM    PLATELET 711 60/08/3879 10:20 AM       ASSESSMENT and PLAN:  Shirley Hurtado is tolerating radiation as anticipated for the current dose and fraction.   We will continue on as planned with another treatment visit anticipated next week.       Ofe Cotter MD   September 3, 2021

## 2021-09-05 ENCOUNTER — HOSPITAL ENCOUNTER (EMERGENCY)
Age: 76
Discharge: HOME | End: 2021-09-05
Attending: EMERGENCY MEDICINE
Payer: COMMERCIAL

## 2021-09-05 ENCOUNTER — APPOINTMENT (OUTPATIENT)
Dept: ULTRASOUND IMAGING | Age: 76
End: 2021-09-05
Attending: EMERGENCY MEDICINE
Payer: COMMERCIAL

## 2021-09-05 VITALS
WEIGHT: 180 LBS | RESPIRATION RATE: 18 BRPM | TEMPERATURE: 98 F | HEART RATE: 88 BPM | BODY MASS INDEX: 23.1 KG/M2 | HEIGHT: 74 IN | SYSTOLIC BLOOD PRESSURE: 128 MMHG | DIASTOLIC BLOOD PRESSURE: 76 MMHG | OXYGEN SATURATION: 98 %

## 2021-09-05 DIAGNOSIS — L03.113 CELLULITIS OF RIGHT UPPER EXTREMITY: Primary | ICD-10-CM

## 2021-09-05 DIAGNOSIS — I82.621 ACUTE DEEP VEIN THROMBOSIS (DVT) OF RIGHT UPPER EXTREMITY, UNSPECIFIED VEIN (HCC): ICD-10-CM

## 2021-09-05 LAB
ALBUMIN SERPL-MCNC: 3.6 G/DL (ref 3.2–4.6)
ALBUMIN/GLOB SERPL: 0.9 {RATIO} (ref 1.2–3.5)
ALP SERPL-CCNC: 81 U/L (ref 50–136)
ALT SERPL-CCNC: 46 U/L (ref 12–65)
ANION GAP SERPL CALC-SCNC: 14 MMOL/L (ref 7–16)
AST SERPL-CCNC: 28 U/L (ref 15–37)
BASOPHILS # BLD: 0 K/UL (ref 0–0.2)
BASOPHILS NFR BLD: 1 % (ref 0–2)
BILIRUB SERPL-MCNC: 0.8 MG/DL (ref 0.2–1.1)
BUN SERPL-MCNC: 15 MG/DL (ref 8–23)
CALCIUM SERPL-MCNC: 9.1 MG/DL (ref 8.3–10.4)
CHLORIDE SERPL-SCNC: 103 MMOL/L (ref 98–107)
CO2 SERPL-SCNC: 21 MMOL/L (ref 21–32)
CREAT SERPL-MCNC: 0.9 MG/DL (ref 0.8–1.5)
DIFFERENTIAL METHOD BLD: ABNORMAL
EOSINOPHIL # BLD: 0 K/UL (ref 0–0.8)
EOSINOPHIL NFR BLD: 1 % (ref 0.5–7.8)
ERYTHROCYTE [DISTWIDTH] IN BLOOD BY AUTOMATED COUNT: 12.1 % (ref 11.9–14.6)
GLOBULIN SER CALC-MCNC: 4.2 G/DL (ref 2.3–3.5)
GLUCOSE SERPL-MCNC: 87 MG/DL (ref 65–100)
HCT VFR BLD AUTO: 29.3 % (ref 41.1–50.3)
HGB BLD-MCNC: 10.1 G/DL (ref 13.6–17.2)
IMM GRANULOCYTES # BLD AUTO: 0 K/UL (ref 0–0.5)
IMM GRANULOCYTES NFR BLD AUTO: 0 % (ref 0–5)
LYMPHOCYTES # BLD: 0.3 K/UL (ref 0.5–4.6)
LYMPHOCYTES NFR BLD: 7 % (ref 13–44)
MCH RBC QN AUTO: 30.4 PG (ref 26.1–32.9)
MCHC RBC AUTO-ENTMCNC: 34.5 G/DL (ref 31.4–35)
MCV RBC AUTO: 88.3 FL (ref 79.6–97.8)
MONOCYTES # BLD: 0.8 K/UL (ref 0.1–1.3)
MONOCYTES NFR BLD: 18 % (ref 4–12)
NEUTS SEG # BLD: 3.2 K/UL (ref 1.7–8.2)
NEUTS SEG NFR BLD: 73 % (ref 43–78)
NRBC # BLD: 0 K/UL (ref 0–0.2)
PLATELET # BLD AUTO: 151 K/UL (ref 150–450)
PMV BLD AUTO: 8.9 FL (ref 9.4–12.3)
POTASSIUM SERPL-SCNC: 3.4 MMOL/L (ref 3.5–5.1)
PROT SERPL-MCNC: 7.8 G/DL (ref 6.3–8.2)
RBC # BLD AUTO: 3.32 M/UL (ref 4.23–5.6)
SODIUM SERPL-SCNC: 138 MMOL/L (ref 136–145)
WBC # BLD AUTO: 4.4 K/UL (ref 4.3–11.1)

## 2021-09-05 PROCEDURE — 81003 URINALYSIS AUTO W/O SCOPE: CPT

## 2021-09-05 PROCEDURE — 87040 BLOOD CULTURE FOR BACTERIA: CPT

## 2021-09-05 PROCEDURE — 80053 COMPREHEN METABOLIC PANEL: CPT

## 2021-09-05 PROCEDURE — 85025 COMPLETE CBC W/AUTO DIFF WBC: CPT

## 2021-09-05 PROCEDURE — 99284 EMERGENCY DEPT VISIT MOD MDM: CPT

## 2021-09-05 PROCEDURE — 74011250637 HC RX REV CODE- 250/637: Performed by: EMERGENCY MEDICINE

## 2021-09-05 PROCEDURE — 93971 EXTREMITY STUDY: CPT

## 2021-09-05 RX ORDER — CEPHALEXIN 500 MG/1
500 CAPSULE ORAL
Status: COMPLETED | OUTPATIENT
Start: 2021-09-05 | End: 2021-09-05

## 2021-09-05 RX ORDER — CLINDAMYCIN PHOSPHATE 900 MG/50ML
900 INJECTION INTRAVENOUS
Status: DISCONTINUED | OUTPATIENT
Start: 2021-09-05 | End: 2021-09-05 | Stop reason: HOSPADM

## 2021-09-05 RX ORDER — CEPHALEXIN 500 MG/1
500 CAPSULE ORAL 4 TIMES DAILY
Qty: 28 CAPSULE | Refills: 0 | Status: SHIPPED | OUTPATIENT
Start: 2021-09-05 | End: 2021-09-12

## 2021-09-05 RX ADMIN — RIVAROXABAN 15 MG: 15 TABLET, FILM COATED ORAL at 19:36

## 2021-09-05 RX ADMIN — CEPHALEXIN 500 MG: 500 CAPSULE ORAL at 19:36

## 2021-09-05 NOTE — ED PROVIDER NOTES
62-year-old gentleman with recent diagnosis of throat cancer presents to the ER with pain redness and swelling to his right bicep area. He just started chemotherapy recently. He is due for a short course and hence no port or central line has been instituted. Wife is concerned this may represent a cellulitis. There is been no scrapes, pokes, insect bite. Arm is mildly painful, no history of DVT. Patient feels a little thirsty and dehydrated as swallowing is difficult due to his throat cancer. There is been no vomiting and no diarrhea.            Past Medical History:   Diagnosis Date    Arthritis     Cancer of base of tongue ([facility] [address]     High cholesterol     Thyroid disease        Past Surgical History:   Procedure Laterality Date    HX COLONOSCOPY  10/26/2015    HX COLONOSCOPY  10/26/2015    HX HEENT Right 2021    DL/E w/ bx of HANK key Phong Ko         Family History:   Problem Relation Age of Onset    Cancer Mother     Heart Attack Father     Hypertension Sister     Alcohol abuse Brother     No Known Problems Maternal Grandmother     No Known Problems Maternal Grandfather     No Known Problems Paternal Grandfather        Social History     Socioeconomic History    Marital status:      Spouse name: Not on file    Number of children: Not on file    Years of education: Not on file    Highest education level: Not on file   Occupational History    Not on file   Tobacco Use    Smoking status: Former Smoker     Types: Cigarettes     Quit date: 1970     Years since quittin.2    Smokeless tobacco: Never Used   Vaping Use    Vaping Use: Never used   Substance and Sexual Activity    Alcohol use: No     Alcohol/week: 0.0 standard drinks    Drug use: No    Sexual activity: Not Currently   Other Topics Concern    Not on file   Social History Narrative    Not on file     Social Determinants of Health     Financial Resource Strain:     Difficulty of Paying Living Expenses:    Food Insecurity:     Worried About Running Out of Food in the Last Year:     [address] in the Last Year:    Transportation Needs:     Lack of Transportation (Medical):  Lack of Transportation (Non-Medical):    Physical Activity:     Days of Exercise per Week:     Minutes of Exercise per Session:    Stress:     Feeling of Stress :    Social Connections:     Frequency of Communication with Friends and Family:     Frequency of Social Gatherings with Friends and Family:     Attends Denominational Services:     Active Member of Clubs or Organizations:     Attends Club or Organization Meetings:     Marital Status:    Intimate Partner Violence:     Fear of Current or Ex-Partner:     Emotionally Abused:     Physically Abused:     Sexually Abused: ALLERGIES: Patient has no known allergies. Review of Systems   Constitutional: Negative for chills and fever. HENT: Negative for rhinorrhea and sore throat. Eyes: Negative for discharge and redness. Respiratory: Negative for cough and shortness of breath. Cardiovascular: Negative for chest pain and palpitations. Gastrointestinal: Negative for abdominal pain, diarrhea, nausea and vomiting. Genitourinary: Negative for difficulty urinating and dysuria. Musculoskeletal: Positive for myalgias. Negative for arthralgias and back pain. Skin: Positive for rash. Neurological: Negative for dizziness and headaches. All other systems reviewed and are negative. Vitals:    09/05/21 1445   BP: 131/79   Pulse: 94   Resp: 16   Temp: 98 °F (36.7 °C)   SpO2: 98%   Weight: 81.6 kg (180 lb)   Height: 6' 2\" (1.88 m)            Physical Exam  Vitals and nursing note reviewed. Constitutional:       General: He is not in acute distress. Appearance: Normal appearance. He is well-developed. He is not ill-appearing, toxic-appearing or diaphoretic. HENT:      Head: Normocephalic and atraumatic.       Right Ear: External ear normal. Left Ear: External ear normal.   Eyes:      General:         Right eye: No discharge. Left eye: No discharge. Conjunctiva/sclera: Conjunctivae normal.   Pulmonary:      Effort: Pulmonary effort is normal. No respiratory distress. Musculoskeletal:         General: Tenderness present. Normal range of motion. Right upper arm: Swelling and tenderness present. Arms:       Cervical back: Normal range of motion and neck supple. Skin:     General: Skin is warm and dry. Findings: Erythema present. No rash. Neurological:      General: No focal deficit present. Mental Status: He is alert and oriented to person, place, and time. Mental status is at baseline. Motor: No abnormal muscle tone. Comments: cni 2-12 grossly  Nl gait,  Nl speech     Psychiatric:         Mood and Affect: Mood normal.         Behavior: Behavior normal.          MDM  Number of Diagnoses or Management Options  Acute deep vein thrombosis (DVT) of right upper extremity, unspecified vein (HCC): new and requires workup  Cellulitis of right upper extremity: new and requires workup  Diagnosis management comments: Medical decision making note:  Cancer patient with warm red right bicipital mild induration suspect cellulitis, doubtful for abscess, will start antibiotics, check lab work, though unlikely, performed Doppler ultrasound for DVT rule out. This concludes the \"medical decision making note\" part of this emergency department visit note.          Amount and/or Complexity of Data Reviewed  Clinical lab tests: reviewed and ordered (Results Include:    Recent Results (from the past 24 hour(s))  -METABOLIC PANEL, COMPREHENSIVE  Collection Time: 09/05/21  3:02 PM       Result                      Value             Ref Range           Sodium                      138               136 - 145 mm*       Potassium                   3.4 (L)           3.5 - 5.1 mm*       Chloride                    103 98 - 107 mmo*       CO2                         21                21 - 32 mmol*       Anion gap                   14                7 - 16 mmol/L       Glucose                     87                65 - 100 mg/*       BUN                         15                8 - 23 MG/DL        Creatinine                  0.90              0.8 - 1.5 MG*       GFR est AA                  >60               >60 ml/min/1*       GFR est non-AA              >60               >60 ml/min/1*       Calcium                     9.1               8.3 - 10.4 M*       Bilirubin, total            0.8               0.2 - 1.1 MG*       ALT (SGPT)                  46                12 - 65 U/L         AST (SGOT)                  28                15 - 37 U/L         Alk.  phosphatase            81                50 - 136 U/L        Protein, total              7.8               6.3 - 8.2 g/*       Albumin                     3.6               3.2 - 4.6 g/*       Globulin                    4.2 (H)           2.3 - 3.5 g/*       A-G Ratio                   0.9 (L)           1.2 - 3.5      )  Tests in the radiology section of CPT®: reviewed and ordered (DUPLEX UPPER EXT VENOUS RIGHT   Final Result    Extensive thrombus within the right cephalic vein.     )  Decide to obtain previous medical records or to obtain history from someone other than the patient: yes    Risk of Complications, Morbidity, and/or Mortality  Presenting problems: moderate  Diagnostic procedures: low  Management options: low    Patient Progress  Patient progress: improved         Procedures

## 2021-09-05 NOTE — ED NOTES
I have reviewed discharge instructions with the patient. The patient verbalized understanding. Patient left ED via Discharge Method: ambulatory to Home with wife). Opportunity for questions and clarification provided. Patient given 2 scripts. To continue your aftercare when you leave the hospital, you may receive an automated call from our care team to check in on how you are doing. This is a free service and part of our promise to provide the best care and service to meet your aftercare needs.  If you have questions, or wish to unsubscribe from this service please call [phone]. Thank you for Choosing our [facility] Emergency Department.

## 2021-09-05 NOTE — ED TRIAGE NOTES
Pt states he got chemo last Monday and has now noticed swelling and redness to mid/upper arm.      Rudy Muñoz RN

## 2021-09-05 NOTE — DISCHARGE INSTRUCTIONS
Start the Xarelto blood thinner, 15 mg twice a day for 3 weeks. On day 22, switch to the 20 mg tablet once a day until advised by Dr. Jamey Mcmanus.     To [place] baths, go ahead and take the Keflex antibiotic in case there is an element of cellulitis/infection

## 2021-09-06 ENCOUNTER — APPOINTMENT (OUTPATIENT)
Dept: RADIATION ONCOLOGY | Age: 76
End: 2021-09-06

## 2021-09-06 ENCOUNTER — HOSPITAL ENCOUNTER (OUTPATIENT)
Dept: INFUSION THERAPY | Age: 76
Discharge: HOME | End: 2021-09-06
Payer: COMMERCIAL

## 2021-09-06 VITALS
OXYGEN SATURATION: 97 % | BODY MASS INDEX: 23.86 KG/M2 | TEMPERATURE: 98.6 F | DIASTOLIC BLOOD PRESSURE: 61 MMHG | RESPIRATION RATE: 16 BRPM | HEART RATE: 98 BPM | WEIGHT: 176.2 LBS | HEIGHT: 72 IN | SYSTOLIC BLOOD PRESSURE: 118 MMHG

## 2021-09-06 DIAGNOSIS — R21 RASH: Primary | ICD-10-CM

## 2021-09-06 DIAGNOSIS — C01 SQUAMOUS CELL CARCINOMA OF BASE OF TONGUE (HCC): ICD-10-CM

## 2021-09-06 PROCEDURE — 74011000258 HC RX REV CODE- 258: Performed by: INTERNAL MEDICINE

## 2021-09-06 PROCEDURE — 96413 CHEMO IV INFUSION 1 HR: CPT

## 2021-09-06 PROCEDURE — 74011250636 HC RX REV CODE- 250/636: Performed by: INTERNAL MEDICINE

## 2021-09-06 PROCEDURE — 96375 TX/PRO/DX INJ NEW DRUG ADDON: CPT

## 2021-09-06 PROCEDURE — 96367 TX/PROPH/DG ADDL SEQ IV INF: CPT

## 2021-09-06 RX ORDER — SODIUM CHLORIDE 9 MG/ML
10 INJECTION INTRAMUSCULAR; INTRAVENOUS; SUBCUTANEOUS AS NEEDED
Status: ACTIVE | OUTPATIENT
Start: 2021-09-06 | End: 2021-09-06

## 2021-09-06 RX ORDER — ONDANSETRON 2 MG/ML
8 INJECTION INTRAMUSCULAR; INTRAVENOUS ONCE
Status: COMPLETED | OUTPATIENT
Start: 2021-09-06 | End: 2021-09-06

## 2021-09-06 RX ORDER — SODIUM CHLORIDE 9 MG/ML
25 INJECTION, SOLUTION INTRAVENOUS CONTINUOUS
Status: ACTIVE | OUTPATIENT
Start: 2021-09-06 | End: 2021-09-06

## 2021-09-06 RX ADMIN — DEXAMETHASONE SODIUM PHOSPHATE 12 MG: 4 INJECTION, SOLUTION INTRAMUSCULAR; INTRAVENOUS at 12:25

## 2021-09-06 RX ADMIN — ONDANSETRON 8 MG: 2 INJECTION INTRAMUSCULAR; INTRAVENOUS at 12:22

## 2021-09-06 RX ADMIN — POTASSIUM CHLORIDE: 2 INJECTION, SOLUTION, CONCENTRATE INTRAVENOUS at 13:28

## 2021-09-06 RX ADMIN — SODIUM CHLORIDE 25 ML/HR: 9 INJECTION, SOLUTION INTRAVENOUS at 12:05

## 2021-09-06 RX ADMIN — FOSAPREPITANT 150 MG: 150 INJECTION, POWDER, LYOPHILIZED, FOR SOLUTION INTRAVENOUS at 12:42

## 2021-09-06 RX ADMIN — CISPLATIN 84 MG: 1 INJECTION, SOLUTION INTRAVENOUS at 14:40

## 2021-09-06 RX ADMIN — SODIUM CHLORIDE 10 ML: 9 INJECTION INTRAMUSCULAR; INTRAVENOUS; SUBCUTANEOUS at 12:05

## 2021-09-06 NOTE — PROGRESS NOTES
Problem: Knowledge Deficit  Goal: *Verbalizes understanding of procedures and medications  Outcome: Progressing Towards Goal     Problem: Chemotherapy Treatment  Goal: *Chemotherapy regimen followed  Outcome: Progressing Towards Goal  Goal: *Hemodynamically stable  Outcome: Progressing Towards Goal  Goal: *Tolerating diet  Outcome: Progressing Towards Goal     Problem: Neutropenia  Goal: *Verbalizes and demonstrates neutropenic precautions  Outcome: Progressing Towards Goal  Goal: *Verbalizes understanding and describes prescribed diet  Outcome: Progressing Towards Goal     Problem: Infection - Risk of, Central Venous Catheter-Associated Bloodstream Infection  Goal: *Absence of infection signs and symptoms  Outcome: Progressing Towards Goal     Problem: Pain  Goal: *Control of Pain  Outcome: Progressing Towards Goal     Problem: Anemia Care Plan (Adult and Pediatric)  Goal: *Labs within defined limits  Outcome: Progressing Towards Goal  Goal: *Tolerates increased activity  Outcome: Progressing Towards Goal     Problem: Anxiety  Goal: *Alleviation of anxiety  Outcome: Progressing Towards Goal

## 2021-09-06 NOTE — PROGRESS NOTES
Arrived to the [facility]. Prehydration, Cisplatin completed. Patient tolerated well. Any issues or concerns during appointment: patient informed RN of new diagnosis R upper arm DVT and cellulitis found yesterday in ER. Currently on medication to treat. R arm not used for infusion. PIV in left arm with no complications. Patient aware of next infusion appointment on 9/13 at 11:30am.  Discharged ambulatory to home.

## 2021-09-06 NOTE — ADDENDUM NOTE
Encounter addended by: Anne Skelton RPH on: 9/6/2021 11:56 AM   Actions taken: i-Elisha created or edited

## 2021-09-07 ENCOUNTER — HOSPITAL ENCOUNTER (OUTPATIENT)
Dept: RADIATION ONCOLOGY | Age: 76
Discharge: HOME | End: 2021-09-07
Payer: MEDICARE

## 2021-09-07 ENCOUNTER — APPOINTMENT (OUTPATIENT)
Dept: RADIATION ONCOLOGY | Age: 76
End: 2021-09-07

## 2021-09-07 PROCEDURE — 77386 HC IMRT TRMT DLVR COMPL: CPT

## 2021-09-08 ENCOUNTER — APPOINTMENT (OUTPATIENT)
Dept: RADIATION ONCOLOGY | Age: 76
End: 2021-09-08

## 2021-09-08 ENCOUNTER — HOSPITAL ENCOUNTER (OUTPATIENT)
Dept: RADIATION ONCOLOGY | Age: 76
Discharge: HOME | End: 2021-09-08
Payer: MEDICARE

## 2021-09-08 ENCOUNTER — HOSPITAL ENCOUNTER (OUTPATIENT)
Dept: PHYSICAL THERAPY | Age: 76
Discharge: HOME | End: 2021-09-08
Attending: INTERNAL MEDICINE
Payer: MEDICARE

## 2021-09-08 PROCEDURE — 77386 HC IMRT TRMT DLVR COMPL: CPT

## 2021-09-08 PROCEDURE — 92526 ORAL FUNCTION THERAPY: CPT

## 2021-09-08 NOTE — PROGRESS NOTES
Randall Greene  : 1945  Primary: [employer]  Secondary:  [address]  [facility], [address]  [phone]   [phone]        OUTPATIENT SPEECH LANGUAGE PATHOLOGY: Daily Note: 3  ICD-10: Treatment Diagnosis: dysphagia, pharyngeal R 13.13  REFERRING PHYSICIAN: Geri Barber MD MD Orders: speech evaluate and treat   PAST MEDICAL HISTORY:    Mr. Margaret Warren is a 78 y.o. male who  has a past medical history of Arthritis, Cancer of base of tongue ([address] High cholesterol, and Thyroid disease. He also has no past medical history of Chronic pain, Malignant hyperthermia due to anesthesia, or Pseudocholinesterase deficiency. He also  has a past surgical history that includes hx colonoscopy (10/26/2015); hx colonoscopy (10/26/2015); and hx heent (Right, 2021). MEDICAL/REFERRING DIAGNOSIS: Squamous cell carcinoma of base of tongue ([facility] [address] [C01]  DATE OF ONSET: 2021   PRIOR LEVEL OF FUNCTION: with spouse  PRECAUTIONS/ALLERGIES: Patient has no known allergies. ASSESSMENT:  Pt reported he went to the ED on  due to arm pain. He reported he has cellulitis and a partial DVT. He reported he was placed on an antibiotic and a blood thinner as a result. He reported he isn't exhibiting pain with swallowing. However, \"everything tastes terrible\". He reported his secretions are thick. He reported he is using his baking soda/salt rinses 3x daily. He did well with laryngeal exercises this date. MBS scheduled for . Patient will benefit from skilled intervention to address the below impairments. ?????? ? ? This section established at most recent assessment??????????  PROBLEM LIST (Impairments causing functional limitations):  1.  Dysphagia   GOALS: (Goals have been discussed and agreed upon with patient.)  SHORT-TERM FUNCTIONAL GOALS: Time Frame: 3 months   Pt will complete laryngeal exercises with 80% accuracy to ensure maintenance of swallow function. Pt will complete tongue base exercises with 80% accuracy to ensure maintenance of swallow function  Pt will complete exercises a min of 5 days weekly to ensure maintenance of swallow function. Pt will use compensatory as indicated to ensure safe, adequate consumption of a po diet with only min cues required. Pt will participate in a MBS x1 to further assess swallow function. DISCHARGE GOALS: Time Frame: 4-5 months   1. Pt will tolerate least restrictive diet without signs/sx aspiration 100% for safe swallow function. REHABILITATION POTENTIAL FOR STATED GOALS: GoodPLAN OF CARE:  Patient will benefit from skilled intervention to address the following impairments. RECOMMENDATIONS AND PLANNED INTERVENTIONS (Benefits and precautions of therapy have been discussed with the patient.):  · continue prescribed diet  · will modify diet as needed during course of chemo/radiation  MEDICATIONS:  · With liquid  COMPENSATORY STRATEGIES/MODIFICATIONS INCLUDING:  · None at this time  · Will modify strategies as needed during course of radiation  OTHER RECOMMENDATIONS (including follow up treatment recommendations):   · Laryngeal exercises  · Patient education  RECOMMENDED DIET MODIFICATIONS DISCUSSED WITH:  · Patient  TREATMENT PLAN EFFECTIVE DATES: 8/17/2021 TO 11/15/2021 (90 days). FREQUENCY/DURATION: Continue to follow patient 1 time a week for 90 days to address above goals. Regarding Liv Jamie Jones's therapy, I certify that the treatment plan above will be carried out by a therapist or under their direction. Thank you for this referral,  Oscar Bernardo, [facility], [facility], [address] [facility]                    Referring Physician Signature: Sayda Villasenor MD    Date      SUBJECTIVE:  Pt cooperative.     Present Symptoms: recent dx of base of tongue cancer      Current Medications:   Current Outpatient Medications:     triamcinolone acetonide (KENALOG) 0.025 % topical cream, Apply  to affected area two (2) times a day. use thin layer, Disp: 15 g, Rfl: 0    LORazepam (ATIVAN) 0.5 mg tablet, Take 2 Tablets by mouth daily as needed for Anxiety (take 1-2 pills as needed for anxiety 30 min prior to radiation). , Disp: 30 Tablet, Rfl: 0    CHOLESTEROL PO, Take  by mouth., Disp: , Rfl:     ondansetron (ZOFRAN ODT) 8 mg disintegrating tablet, Take 1 Tablet by mouth every eight (8) hours as needed for Nausea or Vomiting. Indications: prevent nausea and vomiting from cancer chemotherapy, Disp: 90 Tablet, Rfl: 1    prochlorperazine (Compazine) 10 mg tablet, Take 1 Tablet by mouth every six (6) hours as needed for Nausea or Vomiting. Indications: prevent nausea and vomiting from cancer chemotherapy, Disp: 90 Tablet, Rfl: 1    celecoxib (CELEBREX) 200 mg capsule, Take 1 Capsule by mouth daily. (Patient taking differently: Take 200 mg by mouth daily as needed.), Disp: 30 Capsule, Rfl: 5    levothyroxine (SYNTHROID) 75 mcg tablet, Take 1 Tab by mouth Daily (before breakfast). , Disp: 30 Tab, Rfl: 5    acetaminophen (TYLENOL) 650 mg CR tablet, Take 650 mg by mouth daily. , Disp: , Rfl:     coenzyme q10 (CO Q-10) 10 mg cap, Take  by mouth., Disp: , Rfl:     multivitamin (ONE A DAY) tablet, Take 1 Tab by mouth daily. , Disp: , Rfl:   Antibiotic   Blood thinner    Date Last Reviewed: 9/8/21  Current Dietary Status:  Regular       History of reflux:  NO    Reflux medication:   Social History/Home Situation: with spouse      Work/Activity History: retired    OBJECTIVE:  Objective Measure: Tool Used: National Outcomes Measurement System: Functional Communication Measures: SWALLOWING  Score:  Initial: 6 Most Recent: X (Date: -- )   Interpretation of Tool: This measure describes the change in functional communication status subsequent to speech-language pathology treatment of patients with dysphagia.  o Level 1:  Individual is not able to swallow anything safely by mouth.  All nutrition and hydration is received through non-oral means (e.g., nasogastric tube, PEG). o Level 2: Individual is not able to swallow safely by mouth for nutrition and hydration, but may take some consistency with consistent maximal cues in therapy only. Alternative method of feeding required. o Level 3:  Alternative method of feeding required as individual takes less than 50% of nutrition and hydration by mouth, and/or swallowing is safe with consistent use of moderate cues to use compensatory strategies and/or requires maximum diet restriction. o Level 4:  Swallowing is safe, but usually requires moderate cues to use compensatory strategies, and/or the individual has moderate diet restrictions and/or still requires tube feeding and/or oral supplements. o Level 5:  Swallowing is safe with minimal diet restriction and/or occasionally requires minimal cueing to use compensatory strategies. The individual may occasionally self-cue. All nutrition and hydration needs are met by mouth at mealtime. o Level 6:  Swallowing is safe, and the individual eats and drinks independently and may rarely require minimal cueing. The individual usually self-cues when difficulty occurs. May need to avoid specific food items (e.g., popcorn and nuts), or require additional time (due to dysphagia). o Level 7: The individuals ability to eat independently is not limited by swallow function. Swallowing would be safe and efficient for all consistencies. Compensatory strategies are effectively used when needed. Score Level 7 Level 6 Level 5 Level 4 Level 3 Level 2 Level 1   Modifier CH CI CJ CK CL CM CN       Cognitive and Communication Status:  Alert        TREATMENT:    (In addition to Assessment/Re-Assessment sessions the following treatments were rendered)  Dysphagia Activities: Activities/Procedures listed utilized to improve progress in swallow strength and swallow function. Required minimal cueing to improve swallow safety.     LARYNGEAL / PHARYNGEAL EXERCISES:           Effortful Swallow: Yes  Reps : 10  Sets : 1                                Rylie: Yes  Reps : 10  Sets : 1  Mendelsohn Maneuver: Yes  Reps : 10  Sets : 1 Open Mouth Wide/Yawn: Yes  Reps : 10  Sets : 1  Shaker: Yes  Reps :  (3 sets of 30 seconds; 3 sets of 10 reps)  Sets : 3  Sing \"EEE\": Yes  Reps : 10  Sets : 1                                         __________________________________________________________________________________________________  Treatment Assessment:    Progression/Medical Necessity:   · Skilled intervention continues to be required due to medical complications. Compliance with Program/Exercises: Will assess as treatment progresses. Reason for Continuation of Services/Other Comments:  · Patient continues to require skilled intervention due to medical complications. Recommendations/Intent for next treatment session: \"Treatment next visit will focus on laryngeal exercises\".     Total Treatment Duration:  Time In: 0800  Time Out: 0845    Rosey Lopez, [facility], [place] [facility], [facility], [facility]    Visit Approval Visit # Therapist initials Date A NS / Cx < 24 hr >24 hr Cx Comments    1 RL 8/17 [x]  [] [] Initial evaluation    2 RL 8/27 [x] [] [] Tx    3 RL 9/2 [x] [] [] Tx    4 RL 9/8 [x] [] [] Tx       [] [] []        [] [] []        [] [] []        [] [] []        [] [] []        [] [] []        [] [] []        [] [] []        [] [] []        [] [] []        [] [] []        [] [] []        [] [] []        [] [] []

## 2021-09-09 ENCOUNTER — APPOINTMENT (OUTPATIENT)
Dept: RADIATION ONCOLOGY | Age: 76
End: 2021-09-09

## 2021-09-09 ENCOUNTER — HOSPITAL ENCOUNTER (OUTPATIENT)
Dept: RADIATION ONCOLOGY | Age: 76
Discharge: HOME | End: 2021-09-09
Payer: MEDICARE

## 2021-09-09 PROCEDURE — 77386 HC IMRT TRMT DLVR COMPL: CPT

## 2021-09-10 ENCOUNTER — HOSPITAL ENCOUNTER (OUTPATIENT)
Dept: RADIATION ONCOLOGY | Age: 76
Discharge: HOME | End: 2021-09-10
Payer: MEDICARE

## 2021-09-10 ENCOUNTER — HOSPITAL ENCOUNTER (OUTPATIENT)
Dept: LAB | Age: 76
Discharge: HOME | End: 2021-09-10
Payer: COMMERCIAL

## 2021-09-10 ENCOUNTER — APPOINTMENT (OUTPATIENT)
Dept: RADIATION ONCOLOGY | Age: 76
End: 2021-09-10

## 2021-09-10 VITALS — TEMPERATURE: 98.2 F | HEART RATE: 85 BPM | DIASTOLIC BLOOD PRESSURE: 76 MMHG | SYSTOLIC BLOOD PRESSURE: 139 MMHG

## 2021-09-10 DIAGNOSIS — C01 SQUAMOUS CELL CARCINOMA OF BASE OF TONGUE (HCC): ICD-10-CM

## 2021-09-10 LAB
ALBUMIN SERPL-MCNC: 3.2 G/DL (ref 3.2–4.6)
ALBUMIN/GLOB SERPL: 0.9 {RATIO} (ref 1.2–3.5)
ALP SERPL-CCNC: 70 U/L (ref 50–136)
ALT SERPL-CCNC: 50 U/L (ref 12–65)
ANION GAP SERPL CALC-SCNC: 7 MMOL/L (ref 7–16)
AST SERPL-CCNC: 28 U/L (ref 15–37)
BACTERIA SPEC CULT: NORMAL
BASOPHILS # BLD: 0 K/UL (ref 0–0.2)
BASOPHILS NFR BLD: 1 % (ref 0–2)
BILIRUB SERPL-MCNC: 0.6 MG/DL (ref 0.2–1.1)
BUN SERPL-MCNC: 17 MG/DL (ref 8–23)
CALCIUM SERPL-MCNC: 9 MG/DL (ref 8.3–10.4)
CHLORIDE SERPL-SCNC: 100 MMOL/L (ref 98–107)
CO2 SERPL-SCNC: 31 MMOL/L (ref 21–32)
CREAT SERPL-MCNC: 1 MG/DL (ref 0.8–1.5)
DIFFERENTIAL METHOD BLD: ABNORMAL
EOSINOPHIL # BLD: 0 K/UL (ref 0–0.8)
EOSINOPHIL NFR BLD: 1 % (ref 0.5–7.8)
ERYTHROCYTE [DISTWIDTH] IN BLOOD BY AUTOMATED COUNT: 12.6 % (ref 11.9–14.6)
GLOBULIN SER CALC-MCNC: 3.7 G/DL (ref 2.3–3.5)
GLUCOSE SERPL-MCNC: 93 MG/DL (ref 65–100)
HCT VFR BLD AUTO: 32.1 %
HGB BLD-MCNC: 11.1 G/DL (ref 13.6–17.2)
IMM GRANULOCYTES # BLD AUTO: 0 K/UL (ref 0–0.5)
IMM GRANULOCYTES NFR BLD AUTO: 0 % (ref 0–5)
LYMPHOCYTES # BLD: 0.2 K/UL (ref 0.5–4.6)
LYMPHOCYTES NFR BLD: 10 % (ref 13–44)
MAGNESIUM SERPL-MCNC: 1.7 MG/DL (ref 1.8–2.4)
MCH RBC QN AUTO: 30.1 PG (ref 26.1–32.9)
MCHC RBC AUTO-ENTMCNC: 34.6 G/DL (ref 31.4–35)
MCV RBC AUTO: 87 FL (ref 79.6–97.8)
MONOCYTES # BLD: 0.5 K/UL (ref 0.1–1.3)
MONOCYTES NFR BLD: 21 % (ref 4–12)
NEUTS SEG # BLD: 1.6 K/UL (ref 1.7–8.2)
NEUTS SEG NFR BLD: 68 % (ref 43–78)
NRBC # BLD: 0 K/UL (ref 0–0.2)
PLATELET # BLD AUTO: 120 K/UL (ref 150–450)
PMV BLD AUTO: 8.9 FL (ref 9.4–12.3)
POTASSIUM SERPL-SCNC: 3.1 MMOL/L (ref 3.5–5.1)
PROT SERPL-MCNC: 6.9 G/DL (ref 6.3–8.2)
RBC # BLD AUTO: 3.69 M/UL (ref 4.23–5.6)
SERVICE CMNT-IMP: NORMAL
SODIUM SERPL-SCNC: 138 MMOL/L (ref 136–145)
WBC # BLD AUTO: 2.4 K/UL (ref 4.3–11.1)

## 2021-09-10 PROCEDURE — 80053 COMPREHEN METABOLIC PANEL: CPT

## 2021-09-10 PROCEDURE — 36415 COLL VENOUS BLD VENIPUNCTURE: CPT

## 2021-09-10 PROCEDURE — 83735 ASSAY OF MAGNESIUM: CPT

## 2021-09-10 PROCEDURE — 77386 HC IMRT TRMT DLVR COMPL: CPT

## 2021-09-10 PROCEDURE — 85025 COMPLETE CBC W/AUTO DIFF WBC: CPT

## 2021-09-10 NOTE — PROGRESS NOTES
Patient: Seble Vázquez MRN: [id number]  SSN: [id number]    YOB: 1945  Age: 78 y.o. 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 Week 1:  D1C1 cisplatin 8/16/21, also received Covid booster. Mild nausea relieved with zofran/ compazine. No pain. Week 2: Tolerated second dose of cisplatin 8/23. Intermittent nausea and taste changes, supplementing nutrition with Ensure. Denies pain. Week 3: no complaints  Week 4: no complaints    OBJECTIVE:  NAD; no dermatitis  Visit Vitals  /76   Pulse 85   Temp 98.2 °F (36.8 °C)       Lab Results   Component Value Date/Time    Sodium 138 09/10/2021 11:11 AM    Potassium 3.1 (L) 09/10/2021 11:11 AM    Chloride 100 09/10/2021 11:11 AM    CO2 31 09/10/2021 11:11 AM    Anion gap 7 09/10/2021 11:11 AM    Glucose 93 09/10/2021 11:11 AM    BUN 17 09/10/2021 11:11 AM    Creatinine 1.00 09/10/2021 11:11 AM    GFR est AA >60 09/10/2021 11:11 AM    GFR est non-AA >60 09/10/2021 11:11 AM    Calcium 9.0 09/10/2021 11:11 AM    Magnesium 1.7 (L) 09/10/2021 11:11 AM    Albumin 3.2 09/10/2021 11:11 AM    Protein, total 6.9 09/10/2021 11:11 AM    Globulin 3.7 (H) 09/10/2021 11:11 AM    A-G Ratio 0.9 (L) 09/10/2021 11:11 AM    ALT (SGPT) 50 09/10/2021 11:11 AM     Lab Results   Component Value Date/Time    WBC 2.4 (L) 09/10/2021 11:11 AM    HGB 11.1 (L) 09/10/2021 11:11 AM    HCT 32.1 09/10/2021 11:11 AM    PLATELET 859 (L) 60/32/0437 11:11 AM       ASSESSMENT and PLAN:  Seble Vázquez is tolerating radiation as anticipated for the current dose and fraction. We will continue on as planned with another treatment visit anticipated next week.       Randy Harkins MD   September 10, 2021

## 2021-09-13 ENCOUNTER — HOSPITAL ENCOUNTER (OUTPATIENT)
Dept: RADIATION ONCOLOGY | Age: 76
Discharge: HOME | End: 2021-09-13
Payer: MEDICARE

## 2021-09-13 ENCOUNTER — APPOINTMENT (OUTPATIENT)
Dept: RADIATION ONCOLOGY | Age: 76
End: 2021-09-13

## 2021-09-13 ENCOUNTER — HOSPITAL ENCOUNTER (OUTPATIENT)
Dept: INFUSION THERAPY | Age: 76
Discharge: HOME | End: 2021-09-13
Payer: COMMERCIAL

## 2021-09-13 VITALS
SYSTOLIC BLOOD PRESSURE: 115 MMHG | HEART RATE: 97 BPM | BODY MASS INDEX: 23.33 KG/M2 | RESPIRATION RATE: 16 BRPM | WEIGHT: 172 LBS | OXYGEN SATURATION: 97 % | DIASTOLIC BLOOD PRESSURE: 69 MMHG | TEMPERATURE: 98.1 F

## 2021-09-13 DIAGNOSIS — C01 SQUAMOUS CELL CARCINOMA OF BASE OF TONGUE (HCC): Primary | ICD-10-CM

## 2021-09-13 PROCEDURE — 96375 TX/PRO/DX INJ NEW DRUG ADDON: CPT

## 2021-09-13 PROCEDURE — 74011000258 HC RX REV CODE- 258: Performed by: NURSE PRACTITIONER

## 2021-09-13 PROCEDURE — 77386 HC IMRT TRMT DLVR COMPL: CPT

## 2021-09-13 PROCEDURE — 96413 CHEMO IV INFUSION 1 HR: CPT

## 2021-09-13 PROCEDURE — 96367 TX/PROPH/DG ADDL SEQ IV INF: CPT

## 2021-09-13 PROCEDURE — 74011250636 HC RX REV CODE- 250/636: Performed by: NURSE PRACTITIONER

## 2021-09-13 RX ORDER — ONDANSETRON 2 MG/ML
8 INJECTION INTRAMUSCULAR; INTRAVENOUS ONCE
Status: COMPLETED | OUTPATIENT
Start: 2021-09-13 | End: 2021-09-13

## 2021-09-13 RX ORDER — SODIUM CHLORIDE 0.9 % (FLUSH) 0.9 %
10 SYRINGE (ML) INJECTION AS NEEDED
Status: ACTIVE | OUTPATIENT
Start: 2021-09-13 | End: 2021-09-13

## 2021-09-13 RX ORDER — SODIUM CHLORIDE 9 MG/ML
25 INJECTION, SOLUTION INTRAVENOUS CONTINUOUS
Status: ACTIVE | OUTPATIENT
Start: 2021-09-13 | End: 2021-09-13

## 2021-09-13 RX ADMIN — Medication 10 ML: at 11:30

## 2021-09-13 RX ADMIN — FOSAPREPITANT 150 MG: 150 INJECTION, POWDER, LYOPHILIZED, FOR SOLUTION INTRAVENOUS at 13:03

## 2021-09-13 RX ADMIN — POTASSIUM CHLORIDE: 2 INJECTION, SOLUTION, CONCENTRATE INTRAVENOUS at 11:39

## 2021-09-13 RX ADMIN — SODIUM CHLORIDE 25 ML/HR: 900 INJECTION, SOLUTION INTRAVENOUS at 12:40

## 2021-09-13 RX ADMIN — DEXAMETHASONE SODIUM PHOSPHATE 12 MG: 4 INJECTION, SOLUTION INTRAMUSCULAR; INTRAVENOUS at 12:45

## 2021-09-13 RX ADMIN — ONDANSETRON 8 MG: 2 INJECTION INTRAMUSCULAR; INTRAVENOUS at 12:42

## 2021-09-13 RX ADMIN — CISPLATIN 79.6 MG: 1 INJECTION, SOLUTION INTRAVENOUS at 13:35

## 2021-09-13 NOTE — ROUTINE PROCESS
Pt arrived ambulatory to C. IV established with good blood return. Pre hydration infusing. NS infusing. Pre meds given as ordered. Cisplatin infusing. Iv discontinued. Pt aware of next appt on 9/20/21 at 1100. Discharged ambulatory.

## 2021-09-14 ENCOUNTER — HOSPITAL ENCOUNTER (OUTPATIENT)
Dept: RADIATION ONCOLOGY | Age: 76
Discharge: HOME | End: 2021-09-14
Payer: MEDICARE

## 2021-09-14 ENCOUNTER — APPOINTMENT (OUTPATIENT)
Dept: RADIATION ONCOLOGY | Age: 76
End: 2021-09-14

## 2021-09-14 PROCEDURE — 77336 RADIATION PHYSICS CONSULT: CPT

## 2021-09-14 PROCEDURE — 77386 HC IMRT TRMT DLVR COMPL: CPT

## 2021-09-15 ENCOUNTER — HOSPITAL ENCOUNTER (OUTPATIENT)
Dept: RADIATION ONCOLOGY | Age: 76
Discharge: HOME | End: 2021-09-15
Payer: MEDICARE

## 2021-09-15 ENCOUNTER — APPOINTMENT (OUTPATIENT)
Dept: RADIATION ONCOLOGY | Age: 76
End: 2021-09-15

## 2021-09-15 PROCEDURE — 77386 HC IMRT TRMT DLVR COMPL: CPT

## 2021-09-16 ENCOUNTER — HOSPITAL ENCOUNTER (OUTPATIENT)
Dept: GENERAL RADIOLOGY | Age: 76
Discharge: HOME | End: 2021-09-16
Attending: INTERNAL MEDICINE
Payer: COMMERCIAL

## 2021-09-16 ENCOUNTER — APPOINTMENT (OUTPATIENT)
Dept: RADIATION ONCOLOGY | Age: 76
End: 2021-09-16

## 2021-09-16 ENCOUNTER — HOSPITAL ENCOUNTER (OUTPATIENT)
Dept: RADIATION ONCOLOGY | Age: 76
Discharge: HOME | End: 2021-09-16
Payer: MEDICARE

## 2021-09-16 ENCOUNTER — HOSPITAL ENCOUNTER (OUTPATIENT)
Dept: PHYSICAL THERAPY | Age: 76
Discharge: HOME | End: 2021-09-16
Attending: INTERNAL MEDICINE
Payer: MEDICARE

## 2021-09-16 DIAGNOSIS — R13.12 OROPHARYNGEAL DYSPHAGIA: ICD-10-CM

## 2021-09-16 PROCEDURE — 74230 X-RAY XM SWLNG FUNCJ C+: CPT

## 2021-09-16 PROCEDURE — 92526 ORAL FUNCTION THERAPY: CPT

## 2021-09-16 PROCEDURE — 77386 HC IMRT TRMT DLVR COMPL: CPT

## 2021-09-16 PROCEDURE — 74011000250 HC RX REV CODE- 250: Performed by: INTERNAL MEDICINE

## 2021-09-16 PROCEDURE — 92611 MOTION FLUOROSCOPY/SWALLOW: CPT

## 2021-09-16 RX ADMIN — BARIUM SULFATE 30 ML: 980 POWDER, FOR SUSPENSION ORAL at 09:20

## 2021-09-16 NOTE — THERAPY EVALUATION
Neville Camara  : 1945  Primary: [facility]  Secondary:  [address]  at [address] [facility] 68, 101 [facility], [address]  [phone]   [phone]        OUTPATIENT SPEECH LANGUAGE PATHOLOGY: MODIFIED BARIUM SWALLOW    ICD-10: Treatment Diagnosis: dysphagia, oral R 13.11  DATE: 2021  REFERRING PHYSICIAN: Sharonda Byrnes MD MD Orders: modified barium swallow study   PAST MEDICAL HISTORY:    Mr. Alyssa Reese is a 78 y.o. male who  has a past medical history of Arthritis, Cancer of base of tongue ([address] High cholesterol, and Thyroid disease. He also has no past medical history of Chronic pain, Malignant hyperthermia due to anesthesia, or Pseudocholinesterase deficiency. He also  has a past surgical history that includes hx colonoscopy (10/26/2015); hx colonoscopy (10/26/2015); and hx heent (Right, 2021). MEDICAL/REFERRING DIAGNOSIS: Oropharyngeal dysphagia [R13.12]  DATE OF ONSET: 2021   PRIOR LEVEL OF FUNCTION: with spouse  PRECAUTIONS/ALLERGIES: Patient has no known allergies. ASSESSMENT/PLAN OF CARE:  Pt is currently undergoing chemo/radiation for base of tongue cancer. He is mainly consuming a liquid diet due to feelings of nausea with solid foods. However, no signs/sx aspiration with po. He is currently receiving outpatient ST. Based on the objective data described below, the patient presents with min oral dysphagia. Pt was only given trials of liquids. He consumed liquid via spoon without difficulty. Swallowing was elicited at the base of tongue with no penetration or aspiration or pharyngeal residue. When he was given thin liquids via cup, he immediately gagged once the trial reached the base of tongue. He expelled all of the trial and exhibited some vomiting. He reported he didn't know why that occurred.   He was then given some plain Ginger Ale per his request.  After a couple of minutes, he was able to consume the Ginger Ale via cup with barium without difficulties. He was also given a straw trial.  No penetration or aspiration with either trial and adequate pharyngeal clearance. He declined a pudding trial.  Therefore, study ended. Recommend continuing with current diet. Will continue with outpatient ST to monitor swallowing function and continue with prophylatic dysphagia exercises. Results/recommendations discussed with pt and daughter with verbal understanding expressed. RECOMMENDATIONS AND PLANNED INTERVENTIONS (Benefits and precautions of therapy have been discussed with the patient.):  · continue prescribed diet  MEDICATIONS:  · Crushed in puree  · whole in pureed as tolerated   · Whole as tolerated  COMPENSATORY STRATEGIES/MODIFICATIONS INCLUDING:  · Small sips and bites  OTHER RECOMMENDATIONS (including follow up treatment recommendations):   · Laryngeal exercises    Thank you for this referral,  Zeynep Otto MSP, CCC-SLP             SUBJECTIVE:  Pt cooperative. Present Symptoms: currently undergoing chemo/radiation for base of tongue cancer      Current Dietary Status: mainly liquids   Radiologist: Dr. Marie Correa  History of reflux:  []YES     []NO      Reflux medication:  Social History/Home Situation: with spouse      Work/Activity History: retired    OBJECTIVE:  Objective Measure: Tool Used: National Outcomes Measurement System: Functional Communication Measures: SWALLOWING  Score:  Initial: 4 Most Recent: X (Date: -- )   Interpretation of Tool: This measure describes the change in functional communication status subsequent to speech-language pathology treatment of patients with dysphagia.  o Level 1:  Individual is not able to swallow anything safely by mouth. All nutrition and hydration is received through non-oral means (e.g., nasogastric tube, PEG). o Level 2:   Individual is not able to swallow safely by mouth for nutrition and hydration, but may take some consistency with consistent maximal cues in therapy only. Alternative method of feeding required. o Level 3:  Alternative method of feeding required as individual takes less than 50% of nutrition and hydration by mouth, and/or swallowing is safe with consistent use of moderate cues to use compensatory strategies and/or requires maximum diet restriction. o Level 4:  Swallowing is safe, but usually requires moderate cues to use compensatory strategies, and/or the individual has moderate diet restrictions and/or still requires tube feeding and/or oral supplements. o Level 5:  Swallowing is safe with minimal diet restriction and/or occasionally requires minimal cueing to use compensatory strategies. The individual may occasionally self-cue. All nutrition and hydration needs are met by mouth at mealtime. o Level 6:  Swallowing is safe, and the individual eats and drinks independently and may rarely require minimal cueing. The individual usually self-cues when difficulty occurs. May need to avoid specific food items (e.g., popcorn and nuts), or require additional time (due to dysphagia). o Level 7: The individuals ability to eat independently is not limited by swallow function. Swallowing would be safe and efficient for all consistencies. Compensatory strategies are effectively used when needed. Score Level 7 Level 6 Level 5 Level 4 Level 3 Level 2 Level 1   Modifier CH CI CJ CK CL CM CN     Cognitive/Communication Status:  Mental Status  Neurologic State: Alert    Oral Assessment:  Oral Assessment  Dentition: Intact, Natural  Oral Hygiene: adequate    Vocal Quality: no impairment    Patient Viewed: Patient Position: upright in chair  Film Views: Lateral, Fluoro    Oral Prepatory:  The patient was given the following: Consistency Presented:  Thin liquid  How Presented: Self-fed/presented, SLP-fed/presented, Cup/sip, Spoon, Straw    Oral Phase:  Bolus Acceptance: No impairment  Bolus Formation/Control: No impairment  Propulsion: No impairment     Oral Residue: None  Initiation of Swallow: Triggered at base of tongue, Other (comment) (gagging with water and barium)  Oral Phase Severity: Mild    Pharyngeal Phase:  Timing: No impairment  Decreased Tongue Base Retraction?: No  Laryngeal Elevation: WFL (within functional limits)  Penetration: None  Aspiration/Timing: No evidence of aspiration  Aspiration/Penetration Score: 1 (No penetration or aspiration-Contrast does not enter the airway)  Pharyngeal Symmetry: Not assessed  Pharyngeal Dysfunction: None  Pharyngeal Phase Severity: N/A  Pharyngeal-Esophageal Segment: No impairment    Assessment only;  No treatment(s) provided today  __________________________________________________________________________________________________  Recommendations for treatment: laryngeal and tongue base exercises  Total Treatment Duration:  Time In: 0900   Time Out: 0920    LELA Edmonds, CCC-SLP

## 2021-09-16 NOTE — PROGRESS NOTES
Michelle Kennedy  : 1945  Primary: [employer]  Secondary:  Therapy Center at [address] [place] [address] 68, 101 [facility], [place], [facility]  [phone]   [phone]        OUTPATIENT SPEECH LANGUAGE PATHOLOGY: Progress Report  ICD-10: Treatment Diagnosis: dysphagia, pharyngeal R 13.13  REFERRING PHYSICIAN: Edi Cee MD MD Orders: speech evaluate and treat   PAST MEDICAL HISTORY:    Mr. Cy Cornejo is a 78 y.o. male who  has a past medical history of Arthritis, Cancer of base of tongue ([address] High cholesterol, and Thyroid disease. He also has no past medical history of Chronic pain, Malignant hyperthermia due to anesthesia, or Pseudocholinesterase deficiency. He also  has a past surgical history that includes hx colonoscopy (10/26/2015); hx colonoscopy (10/26/2015); and hx heent (Right, 2021). MEDICAL/REFERRING DIAGNOSIS: Squamous cell carcinoma of base of tongue ([address] [C01]  DATE OF ONSET: 2021   PRIOR LEVEL OF FUNCTION: with spouse  PRECAUTIONS/ALLERGIES: Patient has no known allergies. ASSESSMENT:  Pt has attended 5 sessions due to dysphagia. He does very well with his exercises. He reported it's still too difficult to swallow anything because once it is a bolus it tastes like it's soaked in Crisco or Char Oil. He stated, \"it's almost like my mind is saying, if this goes down you're going to get nauseated\". However, he is able to drink soups that have been in the . He reported he is beginning to exhibit a little bit of pain on the right side. He reported he still isn't taking any pain medication. [facility] planned for this am.    Patient will benefit from skilled intervention to address the below impairments. ?????? ? ? This section established at most recent assessment??????????  PROBLEM LIST (Impairments causing functional limitations):  1.  Dysphagia   GOALS: (Goals have been discussed and agreed upon with patient.)  SHORT-TERM FUNCTIONAL GOALS: Time Frame: 3 months   Pt will complete laryngeal exercises with 80% accuracy to ensure maintenance of swallow function. Goal ongoing. Pt will complete tongue base exercises with 80% accuracy to ensure maintenance of swallow function. Goal ongoing. Pt will complete exercises a min of 5 days weekly to ensure maintenance of swallow function. Goal met. Pt will use compensatory as indicated to ensure safe, adequate consumption of a po diet with only min cues required. Goal ongoing. Pt will participate in a MBS x1 to further assess swallow function. Goal not targeted. DISCHARGE GOALS: Time Frame: 4-5 months   1. Pt will tolerate least restrictive diet without signs/sx aspiration 100% for safe swallow function. REHABILITATION POTENTIAL FOR STATED GOALS: GoodPLAN OF CARE:  Patient will benefit from skilled intervention to address the following impairments. RECOMMENDATIONS AND PLANNED INTERVENTIONS (Benefits and precautions of therapy have been discussed with the patient.):  · continue prescribed diet  · will modify diet as needed during course of chemo/radiation  MEDICATIONS:  · With liquid  COMPENSATORY STRATEGIES/MODIFICATIONS INCLUDING:  · None at this time  · Will modify strategies as needed during course of radiation  OTHER RECOMMENDATIONS (including follow up treatment recommendations):   · Laryngeal exercises  · Patient education  RECOMMENDED DIET MODIFICATIONS DISCUSSED WITH:  · Patient  TREATMENT PLAN EFFECTIVE DATES: 8/17/2021 TO 11/15/2021 (90 days). FREQUENCY/DURATION: Continue to follow patient 1 time a week for 90 days to address above goals. Regarding Mary Jane Goldman's therapy, I certify that the treatment plan above will be carried out by a therapist or under their direction. Thank you for this referral,  Jabari Jose, [facility], [facility] [place], [address] [facility]                    Referring Physician Signature: Sandee Bernheim, MD    Date      SUBJECTIVE:  Pt cooperative.     Present Symptoms: recent dx of base of tongue cancer      Current Medications:   Current Outpatient Medications:     triamcinolone acetonide (KENALOG) 0.025 % topical cream, Apply  to affected area two (2) times a day. use thin layer, Disp: 15 g, Rfl: 0    LORazepam (ATIVAN) 0.5 mg tablet, Take 2 Tablets by mouth daily as needed for Anxiety (take 1-2 pills as needed for anxiety 30 min prior to radiation). , Disp: 30 Tablet, Rfl: 0    CHOLESTEROL PO, Take  by mouth., Disp: , Rfl:     ondansetron (ZOFRAN ODT) 8 mg disintegrating tablet, Take 1 Tablet by mouth every eight (8) hours as needed for Nausea or Vomiting. Indications: prevent nausea and vomiting from cancer chemotherapy, Disp: 90 Tablet, Rfl: 1    prochlorperazine (Compazine) 10 mg tablet, Take 1 Tablet by mouth every six (6) hours as needed for Nausea or Vomiting. Indications: prevent nausea and vomiting from cancer chemotherapy, Disp: 90 Tablet, Rfl: 1    celecoxib (CELEBREX) 200 mg capsule, Take 1 Capsule by mouth daily. (Patient taking differently: Take 200 mg by mouth daily as needed.), Disp: 30 Capsule, Rfl: 5    levothyroxine (SYNTHROID) 75 mcg tablet, Take 1 Tab by mouth Daily (before breakfast). , Disp: 30 Tab, Rfl: 5    acetaminophen (TYLENOL) 650 mg CR tablet, Take 650 mg by mouth daily. , Disp: , Rfl:     coenzyme q10 (CO Q-10) 10 mg cap, Take  by mouth., Disp: , Rfl:     multivitamin (ONE A DAY) tablet, Take 1 Tab by mouth daily. , Disp: , Rfl:   Blood thinner    Date Last Reviewed: 9/16/21  Current Dietary Status:  Regular       History of reflux:  NO    Reflux medication:   Social History/Home Situation: with spouse      Work/Activity History: retired    OBJECTIVE:  Objective Measure:   Tool Used: National Outcomes Measurement System: Functional Communication Measures: SWALLOWING  Score:  Initial: 6 Most Recent: 4  (Date: 9/16/21 )   Interpretation of Tool: This measure describes the change in functional communication status subsequent to speech-language pathology treatment of patients with dysphagia.  o Level 1:  Individual is not able to swallow anything safely by mouth. All nutrition and hydration is received through non-oral means (e.g., nasogastric tube, PEG). o Level 2: Individual is not able to swallow safely by mouth for nutrition and hydration, but may take some consistency with consistent maximal cues in therapy only. Alternative method of feeding required. o Level 3:  Alternative method of feeding required as individual takes less than 50% of nutrition and hydration by mouth, and/or swallowing is safe with consistent use of moderate cues to use compensatory strategies and/or requires maximum diet restriction. o Level 4:  Swallowing is safe, but usually requires moderate cues to use compensatory strategies, and/or the individual has moderate diet restrictions and/or still requires tube feeding and/or oral supplements. o Level 5:  Swallowing is safe with minimal diet restriction and/or occasionally requires minimal cueing to use compensatory strategies. The individual may occasionally self-cue. All nutrition and hydration needs are met by mouth at mealtime. o Level 6:  Swallowing is safe, and the individual eats and drinks independently and may rarely require minimal cueing. The individual usually self-cues when difficulty occurs. May need to avoid specific food items (e.g., popcorn and nuts), or require additional time (due to dysphagia). o Level 7: The individuals ability to eat independently is not limited by swallow function. Swallowing would be safe and efficient for all consistencies. Compensatory strategies are effectively used when needed.   Score Level 7 Level 6 Level 5 Level 4 Level 3 Level 2 Level 1   Modifier CH CI CJ CK CL CM CN       Cognitive and Communication Status:  Alert        TREATMENT:    (In addition to Assessment/Re-Assessment sessions the following treatments were rendered)  Dysphagia Activities: Activities/Procedures listed utilized to improve progress in swallow strength and swallow function. Required minimal cueing to improve swallow safety. LARYNGEAL / PHARYNGEAL EXERCISES:           Effortful Swallow: Yes  Reps : 10  Sets : 1                                Rylie: Yes  Reps : 10  Sets : 1  Mendelsohn Maneuver: Yes  Reps : 10  Sets : 1 Open Mouth Wide/Yawn: Yes  Reps : 10  Sets : 1  Shaker: Yes  Reps :  (3 sets of 30 seconds; 1 set of 15 reps)  Sets : 3  Sing \"EEE\": Yes  Reps : 10  Sets : 1                                         __________________________________________________________________________________________________  Treatment Assessment:    Progression/Medical Necessity:   · Skilled intervention continues to be required due to medical complications. Compliance with Program/Exercises: Will assess as treatment progresses. Reason for Continuation of Services/Other Comments:  · Patient continues to require skilled intervention due to medical complications. Recommendations/Intent for next treatment session: \"Treatment next visit will focus on laryngeal exercises\".     Total Treatment Duration:  Time In: 0800  Time Out: 0840    Avis Saleh, [facility] MEDICO [facility], [facility] DAW[place], [facility]-SLP    Visit Approval Visit # Therapist initials Date A NS / Cx < 24 hr >24 hr Cx Comments    1 RL 8/17 [x]  [] [] Initial evaluation    2 RL 8/27 [x] [] [] Tx    3 RL 9/2 [x] [] [] Tx    4 RL 9/8 [x] [] [] Tx    5 RL 9/16 [x] [] [] Tx       [] [] []        [] [] []        [] [] []        [] [] []        [] [] []        [] [] []        [] [] []        [] [] []        [] [] []        [] [] []        [] [] []        [] [] []        [] [] []

## 2021-09-17 ENCOUNTER — HOSPITAL ENCOUNTER (OUTPATIENT)
Dept: LAB | Age: 76
Discharge: HOME | End: 2021-09-17
Payer: COMMERCIAL

## 2021-09-17 ENCOUNTER — HOSPITAL ENCOUNTER (OUTPATIENT)
Dept: RADIATION ONCOLOGY | Age: 76
Discharge: HOME | End: 2021-09-17
Payer: MEDICARE

## 2021-09-17 ENCOUNTER — APPOINTMENT (OUTPATIENT)
Dept: RADIATION ONCOLOGY | Age: 76
End: 2021-09-17

## 2021-09-17 DIAGNOSIS — C01 SQUAMOUS CELL CARCINOMA OF BASE OF TONGUE (HCC): ICD-10-CM

## 2021-09-17 LAB
ALBUMIN SERPL-MCNC: 3.4 G/DL (ref 3.2–4.6)
ALBUMIN/GLOB SERPL: 0.9 {RATIO} (ref 1.2–3.5)
ALP SERPL-CCNC: 72 U/L (ref 50–136)
ALT SERPL-CCNC: 77 U/L (ref 12–65)
ANION GAP SERPL CALC-SCNC: 8 MMOL/L (ref 7–16)
AST SERPL-CCNC: 47 U/L (ref 15–37)
BASOPHILS # BLD: 0 K/UL (ref 0–0.2)
BASOPHILS NFR BLD: 0 % (ref 0–2)
BILIRUB SERPL-MCNC: 0.9 MG/DL (ref 0.2–1.1)
BUN SERPL-MCNC: 14 MG/DL (ref 8–23)
CALCIUM SERPL-MCNC: 8.8 MG/DL (ref 8.3–10.4)
CHLORIDE SERPL-SCNC: 99 MMOL/L (ref 98–107)
CO2 SERPL-SCNC: 29 MMOL/L (ref 21–32)
CREAT SERPL-MCNC: 1 MG/DL (ref 0.8–1.5)
DIFFERENTIAL METHOD BLD: ABNORMAL
EOSINOPHIL # BLD: 0 K/UL (ref 0–0.8)
EOSINOPHIL NFR BLD: 1 % (ref 0.5–7.8)
ERYTHROCYTE [DISTWIDTH] IN BLOOD BY AUTOMATED COUNT: 12.6 % (ref 11.9–14.6)
GLOBULIN SER CALC-MCNC: 4 G/DL (ref 2.3–3.5)
GLUCOSE SERPL-MCNC: 109 MG/DL (ref 65–100)
HCT VFR BLD AUTO: 30.8 %
HGB BLD-MCNC: 10.8 G/DL (ref 13.6–17.2)
IMM GRANULOCYTES # BLD AUTO: 0 K/UL (ref 0–0.5)
IMM GRANULOCYTES NFR BLD AUTO: 0 % (ref 0–5)
LYMPHOCYTES # BLD: 0.2 K/UL (ref 0.5–4.6)
LYMPHOCYTES NFR BLD: 7 % (ref 13–44)
MAGNESIUM SERPL-MCNC: 1.5 MG/DL (ref 1.8–2.4)
MCH RBC QN AUTO: 29.9 PG (ref 26.1–32.9)
MCHC RBC AUTO-ENTMCNC: 35.1 G/DL (ref 31.4–35)
MCV RBC AUTO: 85.3 FL (ref 79.6–97.8)
MONOCYTES # BLD: 0.3 K/UL (ref 0.1–1.3)
MONOCYTES NFR BLD: 12 % (ref 4–12)
NEUTS SEG # BLD: 2.2 K/UL (ref 1.7–8.2)
NEUTS SEG NFR BLD: 80 % (ref 43–78)
NRBC # BLD: 0 K/UL (ref 0–0.2)
PLATELET # BLD AUTO: 77 K/UL (ref 150–450)
PMV BLD AUTO: 8.3 FL (ref 9.4–12.3)
POTASSIUM SERPL-SCNC: 3.2 MMOL/L (ref 3.5–5.1)
PROT SERPL-MCNC: 7.4 G/DL (ref 6.3–8.2)
RBC # BLD AUTO: 3.61 M/UL (ref 4.23–5.6)
SODIUM SERPL-SCNC: 136 MMOL/L (ref 136–145)
WBC # BLD AUTO: 2.7 K/UL (ref 4.3–11.1)

## 2021-09-17 PROCEDURE — 83735 ASSAY OF MAGNESIUM: CPT

## 2021-09-17 PROCEDURE — 80053 COMPREHEN METABOLIC PANEL: CPT

## 2021-09-17 PROCEDURE — 85025 COMPLETE CBC W/AUTO DIFF WBC: CPT

## 2021-09-17 PROCEDURE — 36415 COLL VENOUS BLD VENIPUNCTURE: CPT

## 2021-09-17 PROCEDURE — 77386 HC IMRT TRMT DLVR COMPL: CPT

## 2021-09-20 ENCOUNTER — APPOINTMENT (OUTPATIENT)
Dept: RADIATION ONCOLOGY | Age: 76
End: 2021-09-20

## 2021-09-20 ENCOUNTER — HOSPITAL ENCOUNTER (OUTPATIENT)
Dept: INFUSION THERAPY | Age: 76
Discharge: HOME | End: 2021-09-20
Payer: COMMERCIAL

## 2021-09-20 ENCOUNTER — HOSPITAL ENCOUNTER (OUTPATIENT)
Dept: RADIATION ONCOLOGY | Age: 76
Discharge: HOME | End: 2021-09-20
Payer: MEDICARE

## 2021-09-20 VITALS
HEART RATE: 96 BPM | TEMPERATURE: 97.8 F | OXYGEN SATURATION: 98 % | SYSTOLIC BLOOD PRESSURE: 115 MMHG | BODY MASS INDEX: 22.43 KG/M2 | WEIGHT: 165.4 LBS | DIASTOLIC BLOOD PRESSURE: 79 MMHG

## 2021-09-20 VITALS
BODY MASS INDEX: 22.41 KG/M2 | HEART RATE: 99 BPM | RESPIRATION RATE: 16 BRPM | SYSTOLIC BLOOD PRESSURE: 116 MMHG | OXYGEN SATURATION: 98 % | TEMPERATURE: 97.9 F | WEIGHT: 165.2 LBS | DIASTOLIC BLOOD PRESSURE: 71 MMHG

## 2021-09-20 DIAGNOSIS — R21 RASH: Primary | ICD-10-CM

## 2021-09-20 DIAGNOSIS — C01 SQUAMOUS CELL CARCINOMA OF BASE OF TONGUE (HCC): ICD-10-CM

## 2021-09-20 PROCEDURE — 77386 HC IMRT TRMT DLVR COMPL: CPT

## 2021-09-20 PROCEDURE — 96367 TX/PROPH/DG ADDL SEQ IV INF: CPT

## 2021-09-20 PROCEDURE — 96375 TX/PRO/DX INJ NEW DRUG ADDON: CPT

## 2021-09-20 PROCEDURE — 74011250636 HC RX REV CODE- 250/636: Performed by: INTERNAL MEDICINE

## 2021-09-20 PROCEDURE — 96366 THER/PROPH/DIAG IV INF ADDON: CPT

## 2021-09-20 PROCEDURE — 74011000258 HC RX REV CODE- 258: Performed by: INTERNAL MEDICINE

## 2021-09-20 PROCEDURE — 77336 RADIATION PHYSICS CONSULT: CPT

## 2021-09-20 PROCEDURE — 96413 CHEMO IV INFUSION 1 HR: CPT

## 2021-09-20 RX ORDER — SODIUM CHLORIDE 9 MG/ML
25 INJECTION, SOLUTION INTRAVENOUS CONTINUOUS
Status: ACTIVE | OUTPATIENT
Start: 2021-09-20 | End: 2021-09-20

## 2021-09-20 RX ORDER — ONDANSETRON 2 MG/ML
8 INJECTION INTRAMUSCULAR; INTRAVENOUS ONCE
Status: COMPLETED | OUTPATIENT
Start: 2021-09-20 | End: 2021-09-20

## 2021-09-20 RX ADMIN — FOSAPREPITANT 150 MG: 150 INJECTION, POWDER, LYOPHILIZED, FOR SOLUTION INTRAVENOUS at 15:32

## 2021-09-20 RX ADMIN — SODIUM CHLORIDE 25 ML/HR: 900 INJECTION, SOLUTION INTRAVENOUS at 11:50

## 2021-09-20 RX ADMIN — ONDANSETRON 8 MG: 2 INJECTION INTRAMUSCULAR; INTRAVENOUS at 15:15

## 2021-09-20 RX ADMIN — MAGNESIUM SULFATE HEPTAHYDRATE: 500 INJECTION, SOLUTION INTRAMUSCULAR; INTRAVENOUS at 12:14

## 2021-09-20 RX ADMIN — CISPLATIN 79.6 MG: 1 INJECTION, SOLUTION INTRAVENOUS at 15:56

## 2021-09-20 RX ADMIN — DEXAMETHASONE SODIUM PHOSPHATE 12 MG: 4 INJECTION, SOLUTION INTRAMUSCULAR; INTRAVENOUS at 15:16

## 2021-09-20 NOTE — PROGRESS NOTES
Patient: Wen Walters MRN: [id number]  SSN: [id number]    YOB: 1945  Age: 78 y.o. Sex: male      DIAGNOSIS:  T2N1M0, stage I, p16 positive SCC of the base of tongue    TREATMENT SITE:  Head and neck    DOSE and FRACTIONATION:  25 of 35 fractions; 5000 of 6000cGy    INTERVAL HISTORY:  Wen Walters is a 78 y.o. 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 Week 1:  D1C1 cisplatin 8/16/21, also received Covid booster. Mild nausea relieved with zofran/ compazine. No pain. Week 2: Tolerated second dose of cisplatin 8/23. Intermittent nausea and taste changes, supplementing nutrition with Ensure. Denies pain.    Week 3: no complaints  Week 4: no complaints  Week 5: notes nausea and regurgitation with solid foods; losing weight; only 1-2 ensure cans/day consumed    OBJECTIVE:  NAD; no dermatitis; mild mucositis in OP  Visit Vitals  /79 (BP 1 Location: Left upper arm, BP Patient Position: Sitting)   Pulse 96   Temp 97.8 °F (36.6 °C)   Wt 75 kg (165 lb 6.4 oz)   SpO2 98%   BMI 22.43 kg/m²       Lab Results   Component Value Date/Time    Sodium 136 09/17/2021 10:36 AM    Potassium 3.2 (L) 09/17/2021 10:36 AM    Chloride 99 09/17/2021 10:36 AM    CO2 29 09/17/2021 10:36 AM    Anion gap 8 09/17/2021 10:36 AM    Glucose 109 (H) 09/17/2021 10:36 AM    BUN 14 09/17/2021 10:36 AM    Creatinine 1.00 09/17/2021 10:36 AM    GFR est AA >60 09/17/2021 10:36 AM    GFR est non-AA >60 09/17/2021 10:36 AM    Calcium 8.8 09/17/2021 10:36 AM    Magnesium 1.5 (L) 09/17/2021 10:36 AM    Albumin 3.4 09/17/2021 10:36 AM    Protein, total 7.4 09/17/2021 10:36 AM    Globulin 4.0 (H) 09/17/2021 10:36 AM    A-G Ratio 0.9 (L) 09/17/2021 10:36 AM    ALT (SGPT) 77 (H) 09/17/2021 10:36 AM     Lab Results   Component Value Date/Time    WBC 2.7 (L) 09/17/2021 10:36 AM    HGB 10.8 (L) 09/17/2021 10:36 AM    HCT 30.8 09/17/2021 10:36 AM    PLATELET 77 (L) 85/56/9848 10:36 AM       ASSESSMENT and PLAN:  Ferdinand Mondragon is tolerating radiation as anticipated for the current dose and fraction. We will continue on as planned with another treatment visit anticipated next week.  Recommend increasing shakes to 3-4/day     Bandar Cadena MD   September 20, 2021

## 2021-09-20 NOTE — PROGRESS NOTES
Arrived to the [facility]. Cisplatin & IVF completed. Patient tolerated well. Any issues or concerns during appointment: None. Patient aware of next infusion appointment on 9/22 (date) at 0900 (time). Patient aware of next lab and [facility] office visit on 9/24 (date) at 56 (time). Discharged ambulatory in stable condition.

## 2021-09-21 ENCOUNTER — HOSPITAL ENCOUNTER (OUTPATIENT)
Dept: RADIATION ONCOLOGY | Age: 76
Discharge: HOME | End: 2021-09-21
Payer: MEDICARE

## 2021-09-21 ENCOUNTER — APPOINTMENT (OUTPATIENT)
Dept: RADIATION ONCOLOGY | Age: 76
End: 2021-09-21

## 2021-09-21 PROCEDURE — 77386 HC IMRT TRMT DLVR COMPL: CPT

## 2021-09-22 ENCOUNTER — HOSPITAL ENCOUNTER (OUTPATIENT)
Dept: INFUSION THERAPY | Age: 76
Discharge: HOME | End: 2021-09-22
Payer: MEDICARE

## 2021-09-22 ENCOUNTER — HOSPITAL ENCOUNTER (OUTPATIENT)
Dept: RADIATION ONCOLOGY | Age: 76
Discharge: HOME | End: 2021-09-22
Payer: MEDICARE

## 2021-09-22 ENCOUNTER — APPOINTMENT (OUTPATIENT)
Dept: RADIATION ONCOLOGY | Age: 76
End: 2021-09-22

## 2021-09-22 VITALS
HEART RATE: 90 BPM | OXYGEN SATURATION: 98 % | TEMPERATURE: 97.5 F | RESPIRATION RATE: 16 BRPM | DIASTOLIC BLOOD PRESSURE: 69 MMHG | SYSTOLIC BLOOD PRESSURE: 132 MMHG

## 2021-09-22 DIAGNOSIS — C01 SQUAMOUS CELL CARCINOMA OF BASE OF TONGUE (HCC): Primary | ICD-10-CM

## 2021-09-22 PROCEDURE — 74011250636 HC RX REV CODE- 250/636: Performed by: INTERNAL MEDICINE

## 2021-09-22 PROCEDURE — 96360 HYDRATION IV INFUSION INIT: CPT

## 2021-09-22 PROCEDURE — 77386 HC IMRT TRMT DLVR COMPL: CPT

## 2021-09-22 RX ORDER — SODIUM CHLORIDE 9 MG/ML
1000 INJECTION, SOLUTION INTRAVENOUS CONTINUOUS
Status: DISCONTINUED | OUTPATIENT
Start: 2021-09-22 | End: 2021-09-24 | Stop reason: HOSPADM

## 2021-09-22 RX ADMIN — SODIUM CHLORIDE 1000 ML/HR: 9 INJECTION, SOLUTION INTRAVENOUS at 09:20

## 2021-09-22 NOTE — PROGRESS NOTES
Arrived to the [facility]. 1L NS completed. Patient tolerated well. Any issues or concerns during appointment: none. Patient aware of next infusion appointment on 9/29  Discharged ambulatory.

## 2021-09-23 ENCOUNTER — APPOINTMENT (OUTPATIENT)
Dept: RADIATION ONCOLOGY | Age: 76
End: 2021-09-23

## 2021-09-23 ENCOUNTER — HOSPITAL ENCOUNTER (OUTPATIENT)
Dept: RADIATION ONCOLOGY | Age: 76
Discharge: HOME | End: 2021-09-23
Payer: MEDICARE

## 2021-09-23 PROCEDURE — 77386 HC IMRT TRMT DLVR COMPL: CPT

## 2021-09-24 ENCOUNTER — HOSPITAL ENCOUNTER (OUTPATIENT)
Dept: LAB | Age: 76
Discharge: HOME | End: 2021-09-24
Payer: MEDICARE

## 2021-09-24 ENCOUNTER — APPOINTMENT (OUTPATIENT)
Dept: RADIATION ONCOLOGY | Age: 76
End: 2021-09-24

## 2021-09-24 ENCOUNTER — HOSPITAL ENCOUNTER (OUTPATIENT)
Dept: RADIATION ONCOLOGY | Age: 76
Discharge: HOME | End: 2021-09-24
Payer: MEDICARE

## 2021-09-24 ENCOUNTER — HOSPITAL ENCOUNTER (OUTPATIENT)
Dept: INFUSION THERAPY | Age: 76
Discharge: HOME | End: 2021-09-24
Payer: MEDICARE

## 2021-09-24 VITALS
TEMPERATURE: 97.8 F | HEART RATE: 85 BPM | DIASTOLIC BLOOD PRESSURE: 69 MMHG | RESPIRATION RATE: 18 BRPM | SYSTOLIC BLOOD PRESSURE: 141 MMHG | OXYGEN SATURATION: 100 %

## 2021-09-24 VITALS
BODY MASS INDEX: 22.73 KG/M2 | TEMPERATURE: 97.6 F | WEIGHT: 167.6 LBS | HEART RATE: 94 BPM | OXYGEN SATURATION: 98 % | DIASTOLIC BLOOD PRESSURE: 79 MMHG | SYSTOLIC BLOOD PRESSURE: 139 MMHG

## 2021-09-24 DIAGNOSIS — C01 SQUAMOUS CELL CARCINOMA OF BASE OF TONGUE (HCC): ICD-10-CM

## 2021-09-24 DIAGNOSIS — E83.42 HYPOMAGNESEMIA: ICD-10-CM

## 2021-09-24 DIAGNOSIS — E87.6 HYPOKALEMIA: ICD-10-CM

## 2021-09-24 DIAGNOSIS — C01 SQUAMOUS CELL CARCINOMA OF BASE OF TONGUE (HCC): Primary | ICD-10-CM

## 2021-09-24 LAB
ALBUMIN SERPL-MCNC: 3.4 G/DL (ref 3.2–4.6)
ALBUMIN/GLOB SERPL: 0.9 {RATIO} (ref 1.2–3.5)
ALP SERPL-CCNC: 70 U/L (ref 50–136)
ALT SERPL-CCNC: 71 U/L (ref 12–65)
ANION GAP SERPL CALC-SCNC: 8 MMOL/L (ref 7–16)
AST SERPL-CCNC: 42 U/L (ref 15–37)
BASOPHILS # BLD: 0 K/UL (ref 0–0.2)
BASOPHILS NFR BLD: 1 % (ref 0–2)
BILIRUB SERPL-MCNC: 0.8 MG/DL (ref 0.2–1.1)
BUN SERPL-MCNC: 18 MG/DL (ref 8–23)
CALCIUM SERPL-MCNC: 9 MG/DL (ref 8.3–10.4)
CHLORIDE SERPL-SCNC: 100 MMOL/L (ref 98–107)
CO2 SERPL-SCNC: 30 MMOL/L (ref 21–32)
CREAT SERPL-MCNC: 1.1 MG/DL (ref 0.8–1.5)
DIFFERENTIAL METHOD BLD: ABNORMAL
EOSINOPHIL # BLD: 0 K/UL (ref 0–0.8)
EOSINOPHIL NFR BLD: 2 % (ref 0.5–7.8)
ERYTHROCYTE [DISTWIDTH] IN BLOOD BY AUTOMATED COUNT: 12.9 % (ref 11.9–14.6)
GLOBULIN SER CALC-MCNC: 3.9 G/DL (ref 2.3–3.5)
GLUCOSE SERPL-MCNC: 96 MG/DL (ref 65–100)
HCT VFR BLD AUTO: 28.6 %
HGB BLD-MCNC: 10 G/DL (ref 13.6–17.2)
IMM GRANULOCYTES # BLD AUTO: 0 K/UL (ref 0–0.5)
IMM GRANULOCYTES NFR BLD AUTO: 0 % (ref 0–5)
LYMPHOCYTES # BLD: 0.1 K/UL (ref 0.5–4.6)
LYMPHOCYTES NFR BLD: 13 % (ref 13–44)
MAGNESIUM SERPL-MCNC: 1.3 MG/DL (ref 1.8–2.4)
MCH RBC QN AUTO: 30.1 PG (ref 26.1–32.9)
MCHC RBC AUTO-ENTMCNC: 35 G/DL (ref 31.4–35)
MCV RBC AUTO: 86.1 FL (ref 79.6–97.8)
MONOCYTES # BLD: 0.1 K/UL (ref 0.1–1.3)
MONOCYTES NFR BLD: 13 % (ref 4–12)
NEUTS SEG # BLD: 0.7 K/UL (ref 1.7–8.2)
NEUTS SEG NFR BLD: 71 % (ref 43–78)
NRBC # BLD: 0 K/UL (ref 0–0.2)
PLATELET # BLD AUTO: 91 K/UL (ref 150–450)
PMV BLD AUTO: 8.8 FL (ref 9.4–12.3)
POTASSIUM SERPL-SCNC: 2.8 MMOL/L (ref 3.5–5.1)
PROT SERPL-MCNC: 7.3 G/DL (ref 6.3–8.2)
RBC # BLD AUTO: 3.32 M/UL (ref 4.23–5.6)
SODIUM SERPL-SCNC: 138 MMOL/L (ref 136–145)
WBC # BLD AUTO: 1 K/UL (ref 4.3–11.1)

## 2021-09-24 PROCEDURE — 77386 HC IMRT TRMT DLVR COMPL: CPT

## 2021-09-24 PROCEDURE — 96368 THER/DIAG CONCURRENT INF: CPT

## 2021-09-24 PROCEDURE — 96375 TX/PRO/DX INJ NEW DRUG ADDON: CPT

## 2021-09-24 PROCEDURE — 96366 THER/PROPH/DIAG IV INF ADDON: CPT

## 2021-09-24 PROCEDURE — 36415 COLL VENOUS BLD VENIPUNCTURE: CPT

## 2021-09-24 PROCEDURE — 85025 COMPLETE CBC W/AUTO DIFF WBC: CPT

## 2021-09-24 PROCEDURE — 80053 COMPREHEN METABOLIC PANEL: CPT

## 2021-09-24 PROCEDURE — 83735 ASSAY OF MAGNESIUM: CPT

## 2021-09-24 PROCEDURE — 96365 THER/PROPH/DIAG IV INF INIT: CPT

## 2021-09-24 PROCEDURE — 74011250636 HC RX REV CODE- 250/636: Performed by: INTERNAL MEDICINE

## 2021-09-24 PROCEDURE — 96361 HYDRATE IV INFUSION ADD-ON: CPT

## 2021-09-24 RX ORDER — ONDANSETRON 2 MG/ML
8 INJECTION INTRAMUSCULAR; INTRAVENOUS ONCE
Status: COMPLETED | OUTPATIENT
Start: 2021-09-24 | End: 2021-09-24

## 2021-09-24 RX ORDER — MAGNESIUM SULFATE HEPTAHYDRATE 40 MG/ML
4 INJECTION, SOLUTION INTRAVENOUS ONCE
Status: COMPLETED | OUTPATIENT
Start: 2021-09-24 | End: 2021-09-24

## 2021-09-24 RX ORDER — SODIUM CHLORIDE 9 MG/ML
1000 INJECTION, SOLUTION INTRAVENOUS CONTINUOUS
Status: DISCONTINUED | OUTPATIENT
Start: 2021-09-24 | End: 2021-09-26 | Stop reason: HOSPADM

## 2021-09-24 RX ORDER — POTASSIUM CHLORIDE 29.8 MG/ML
40 INJECTION INTRAVENOUS ONCE
Status: DISCONTINUED | OUTPATIENT
Start: 2021-09-24 | End: 2021-09-24 | Stop reason: SDUPTHER

## 2021-09-24 RX ORDER — SODIUM CHLORIDE 0.9 % (FLUSH) 0.9 %
10 SYRINGE (ML) INJECTION AS NEEDED
Status: DISCONTINUED | OUTPATIENT
Start: 2021-09-24 | End: 2021-09-25 | Stop reason: HOSPADM

## 2021-09-24 RX ADMIN — POTASSIUM CHLORIDE: 149 INJECTION, SOLUTION, CONCENTRATE INTRAVENOUS at 10:44

## 2021-09-24 RX ADMIN — SODIUM CHLORIDE 1000 ML/HR: 9 INJECTION, SOLUTION INTRAVENOUS at 09:24

## 2021-09-24 RX ADMIN — MAGNESIUM SULFATE HEPTAHYDRATE 4 G: 40 INJECTION, SOLUTION INTRAVENOUS at 10:42

## 2021-09-24 RX ADMIN — ONDANSETRON 8 MG: 2 INJECTION INTRAMUSCULAR; INTRAVENOUS at 13:12

## 2021-09-24 RX ADMIN — Medication 10 ML: at 15:55

## 2021-09-24 NOTE — PROGRESS NOTES
[facility] IV Mg, K+, and hydration completed. Patient tolerated well. Any issues or concerns during appointment: Prior to going to radiation, patient c/o nausea and vomiting. Zofran IV administered X1. Patient reports feeling better. Patient aware of next infusion appointment on 9/27 (date) at 8:00 AM (time). Discharged ambulatory.

## 2021-09-24 NOTE — PROGRESS NOTES
Patient: Pascual Madera MRN: [id number]  SSN: [id number]    YOB: 1945  Age: 78 y.o. 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 Week 1:  D1C1 cisplatin 8/16/21, also received Covid booster. Mild nausea relieved with zofran/ compazine. No pain. Week 2: Tolerated second dose of cisplatin 8/23. Intermittent nausea and taste changes, supplementing nutrition with Ensure. Denies pain.    Week 3: no complaints  Week 4: no complaints  Week 5: notes nausea and regurgitation with solid foods; losing weight; only 1-2 ensure cans/day consumed  Week 6: weakness and weight loss, very low appetite; chemotherapy completed 9/20/21    OBJECTIVE:  NAD; no dermatitis; mild mucositis in OP  Visit Vitals  /79   Pulse 94   Temp 97.6 °F (36.4 °C)   Wt 76 kg (167 lb 9.6 oz)   SpO2 98%   BMI 22.73 kg/m²       Lab Results   Component Value Date/Time    Sodium 136 09/17/2021 10:36 AM    Potassium 3.2 (L) 09/17/2021 10:36 AM    Chloride 99 09/17/2021 10:36 AM    CO2 29 09/17/2021 10:36 AM    Anion gap 8 09/17/2021 10:36 AM    Glucose 109 (H) 09/17/2021 10:36 AM    BUN 14 09/17/2021 10:36 AM    Creatinine 1.00 09/17/2021 10:36 AM    GFR est AA >60 09/17/2021 10:36 AM    GFR est non-AA >60 09/17/2021 10:36 AM    Calcium 8.8 09/17/2021 10:36 AM    Magnesium 1.5 (L) 09/17/2021 10:36 AM    Albumin 3.4 09/17/2021 10:36 AM    Protein, total 7.4 09/17/2021 10:36 AM    Globulin 4.0 (H) 09/17/2021 10:36 AM    A-G Ratio 0.9 (L) 09/17/2021 10:36 AM    ALT (SGPT) 77 (H) 09/17/2021 10:36 AM     Lab Results   Component Value Date/Time    WBC 2.7 (L) 09/17/2021 10:36 AM    HGB 10.8 (L) 09/17/2021 10:36 AM    HCT 30.8 09/17/2021 10:36 AM    PLATELET 77 (L) 98/15/2753 10:36 AM ASSESSMENT and PLAN:  Shirley Hurtado is tolerating radiation as anticipated for the current dose and fraction. We will continue on as planned with another treatment visit anticipated next week.    - Appreciate nutrition recommendations and reviewed with Mr. Abbey Cabrera palliative care recommendations, continue moisturizer for skin care and baking soda rinses for oral care    Kervin Major MD   September 24, 2021

## 2021-09-24 NOTE — PROGRESS NOTES
Labs reviewed with pt's navigator, Javier Dalton and orders received for replacements. Discussed with pt and spouse. Dr. Marco Antonio Lopes and Javier Dalton in to see pt. Report given to VAMSHI Galvan RN

## 2021-09-27 ENCOUNTER — HOSPITAL ENCOUNTER (OUTPATIENT)
Dept: RADIATION ONCOLOGY | Age: 76
Discharge: HOME | End: 2021-09-27
Payer: MEDICARE

## 2021-09-27 ENCOUNTER — APPOINTMENT (OUTPATIENT)
Dept: RADIATION ONCOLOGY | Age: 76
End: 2021-09-27

## 2021-09-27 ENCOUNTER — HOSPITAL ENCOUNTER (OUTPATIENT)
Dept: INFUSION THERAPY | Age: 76
Discharge: HOME | End: 2021-09-27
Payer: MEDICARE

## 2021-09-27 VITALS
HEART RATE: 107 BPM | RESPIRATION RATE: 16 BRPM | TEMPERATURE: 97.9 F | OXYGEN SATURATION: 100 % | SYSTOLIC BLOOD PRESSURE: 116 MMHG | DIASTOLIC BLOOD PRESSURE: 62 MMHG

## 2021-09-27 DIAGNOSIS — E83.42 HYPOMAGNESEMIA: ICD-10-CM

## 2021-09-27 DIAGNOSIS — C01 SQUAMOUS CELL CARCINOMA OF BASE OF TONGUE (HCC): ICD-10-CM

## 2021-09-27 DIAGNOSIS — E87.6 HYPOKALEMIA: Primary | ICD-10-CM

## 2021-09-27 LAB
ALBUMIN SERPL-MCNC: 3.4 G/DL (ref 3.2–4.6)
ALBUMIN/GLOB SERPL: 0.9 {RATIO} (ref 1.2–3.5)
ALP SERPL-CCNC: 73 U/L (ref 50–136)
ALT SERPL-CCNC: 58 U/L (ref 12–65)
ANION GAP SERPL CALC-SCNC: 11 MMOL/L (ref 7–16)
AST SERPL-CCNC: 37 U/L (ref 15–37)
BILIRUB SERPL-MCNC: 0.8 MG/DL (ref 0.2–1.1)
BUN SERPL-MCNC: 17 MG/DL (ref 8–23)
CALCIUM SERPL-MCNC: 8.9 MG/DL (ref 8.3–10.4)
CHLORIDE SERPL-SCNC: 96 MMOL/L (ref 98–107)
CO2 SERPL-SCNC: 29 MMOL/L (ref 21–32)
CREAT SERPL-MCNC: 1.3 MG/DL (ref 0.8–1.5)
GLOBULIN SER CALC-MCNC: 3.9 G/DL (ref 2.3–3.5)
GLUCOSE SERPL-MCNC: 98 MG/DL (ref 65–100)
MAGNESIUM SERPL-MCNC: 1.3 MG/DL (ref 1.8–2.4)
POTASSIUM SERPL-SCNC: 2.7 MMOL/L (ref 3.5–5.1)
PROT SERPL-MCNC: 7.3 G/DL (ref 6.3–8.2)
SODIUM SERPL-SCNC: 136 MMOL/L (ref 136–145)

## 2021-09-27 PROCEDURE — 96365 THER/PROPH/DIAG IV INF INIT: CPT

## 2021-09-27 PROCEDURE — 96366 THER/PROPH/DIAG IV INF ADDON: CPT

## 2021-09-27 PROCEDURE — 74011250636 HC RX REV CODE- 250/636: Performed by: INTERNAL MEDICINE

## 2021-09-27 PROCEDURE — 80053 COMPREHEN METABOLIC PANEL: CPT

## 2021-09-27 PROCEDURE — 96361 HYDRATE IV INFUSION ADD-ON: CPT

## 2021-09-27 PROCEDURE — 83735 ASSAY OF MAGNESIUM: CPT

## 2021-09-27 PROCEDURE — 77386 HC IMRT TRMT DLVR COMPL: CPT

## 2021-09-27 RX ORDER — MAGNESIUM SULFATE HEPTAHYDRATE 40 MG/ML
4 INJECTION, SOLUTION INTRAVENOUS ONCE
Status: DISCONTINUED | OUTPATIENT
Start: 2021-09-27 | End: 2021-09-27 | Stop reason: SDUPTHER

## 2021-09-27 RX ORDER — SODIUM CHLORIDE 9 MG/ML
1000 INJECTION, SOLUTION INTRAVENOUS CONTINUOUS
Status: DISCONTINUED | OUTPATIENT
Start: 2021-09-27 | End: 2021-09-29 | Stop reason: HOSPADM

## 2021-09-27 RX ORDER — SODIUM CHLORIDE 0.9 % (FLUSH) 0.9 %
10 SYRINGE (ML) INJECTION AS NEEDED
Status: DISCONTINUED | OUTPATIENT
Start: 2021-09-27 | End: 2021-09-29 | Stop reason: HOSPADM

## 2021-09-27 RX ORDER — POTASSIUM CHLORIDE 29.8 MG/ML
40 INJECTION INTRAVENOUS ONCE
Status: DISCONTINUED | OUTPATIENT
Start: 2021-09-27 | End: 2021-09-27 | Stop reason: SDUPTHER

## 2021-09-27 RX ADMIN — Medication 10 ML: at 14:40

## 2021-09-27 RX ADMIN — Medication 10 ML: at 08:20

## 2021-09-27 RX ADMIN — SODIUM CHLORIDE 1000 ML/HR: 900 INJECTION, SOLUTION INTRAVENOUS at 08:20

## 2021-09-27 RX ADMIN — MAGNESIUM SULFATE HEPTAHYDRATE: 500 INJECTION, SOLUTION INTRAMUSCULAR; INTRAVENOUS at 09:50

## 2021-09-27 NOTE — PROGRESS NOTES
Arrived to the [facility]. Labs, 1L NS, 40mEq K, 4G mag completed. Patient tolerated well. Any issues or concerns during appointment: K 2.7, mag 1.3. Orders placed for 40mEq K, 4G mag. Patient aware of next infusion appointment on 9/29/2021 (date) at 8:30 am (time). Patient aware of next lab and [facility] office visit on 10/1/2021 (date) at 9:30 am (time). Discharged ambulatory with spouse to T.

## 2021-09-28 ENCOUNTER — HOSPITAL ENCOUNTER (OUTPATIENT)
Dept: RADIATION ONCOLOGY | Age: 76
Discharge: HOME | End: 2021-09-28
Payer: MEDICARE

## 2021-09-28 ENCOUNTER — APPOINTMENT (OUTPATIENT)
Dept: RADIATION ONCOLOGY | Age: 76
End: 2021-09-28

## 2021-09-28 PROCEDURE — 77336 RADIATION PHYSICS CONSULT: CPT

## 2021-09-28 PROCEDURE — 77386 HC IMRT TRMT DLVR COMPL: CPT

## 2021-09-29 ENCOUNTER — HOSPITAL ENCOUNTER (OUTPATIENT)
Dept: INFUSION THERAPY | Age: 76
Discharge: HOME | End: 2021-09-29
Payer: MEDICARE

## 2021-09-29 ENCOUNTER — APPOINTMENT (OUTPATIENT)
Dept: RADIATION ONCOLOGY | Age: 76
End: 2021-09-29

## 2021-09-29 ENCOUNTER — HOSPITAL ENCOUNTER (OUTPATIENT)
Dept: RADIATION ONCOLOGY | Age: 76
Discharge: HOME | End: 2021-09-29
Payer: MEDICARE

## 2021-09-29 VITALS
BODY MASS INDEX: 22.38 KG/M2 | TEMPERATURE: 98.1 F | RESPIRATION RATE: 18 BRPM | WEIGHT: 165 LBS | OXYGEN SATURATION: 99 % | HEART RATE: 105 BPM | SYSTOLIC BLOOD PRESSURE: 124 MMHG | DIASTOLIC BLOOD PRESSURE: 65 MMHG

## 2021-09-29 DIAGNOSIS — E87.6 HYPOKALEMIA: Primary | ICD-10-CM

## 2021-09-29 DIAGNOSIS — E83.42 HYPOMAGNESEMIA: ICD-10-CM

## 2021-09-29 DIAGNOSIS — C01 SQUAMOUS CELL CARCINOMA OF BASE OF TONGUE (HCC): ICD-10-CM

## 2021-09-29 LAB
MAGNESIUM SERPL-MCNC: 1.5 MG/DL (ref 1.8–2.4)
POTASSIUM SERPL-SCNC: 3.1 MMOL/L (ref 3.5–5.1)

## 2021-09-29 PROCEDURE — 83735 ASSAY OF MAGNESIUM: CPT

## 2021-09-29 PROCEDURE — 96366 THER/PROPH/DIAG IV INF ADDON: CPT

## 2021-09-29 PROCEDURE — 77386 HC IMRT TRMT DLVR COMPL: CPT

## 2021-09-29 PROCEDURE — 96365 THER/PROPH/DIAG IV INF INIT: CPT

## 2021-09-29 PROCEDURE — 74011250636 HC RX REV CODE- 250/636: Performed by: INTERNAL MEDICINE

## 2021-09-29 PROCEDURE — 84132 ASSAY OF SERUM POTASSIUM: CPT

## 2021-09-29 RX ORDER — SODIUM CHLORIDE 9 MG/ML
1000 INJECTION, SOLUTION INTRAVENOUS CONTINUOUS
Status: DISCONTINUED | OUTPATIENT
Start: 2021-09-29 | End: 2021-10-01 | Stop reason: HOSPADM

## 2021-09-29 RX ORDER — SODIUM CHLORIDE 0.9 % (FLUSH) 0.9 %
10 SYRINGE (ML) INJECTION AS NEEDED
Status: DISCONTINUED | OUTPATIENT
Start: 2021-09-29 | End: 2021-10-01 | Stop reason: HOSPADM

## 2021-09-29 RX ADMIN — Medication 10 ML: at 15:45

## 2021-09-29 RX ADMIN — MAGNESIUM SULFATE HEPTAHYDRATE: 500 INJECTION, SOLUTION INTRAMUSCULAR; INTRAVENOUS at 11:37

## 2021-09-29 RX ADMIN — SODIUM CHLORIDE 1000 ML/HR: 900 INJECTION, SOLUTION INTRAVENOUS at 11:37

## 2021-09-29 RX ADMIN — Medication 10 ML: at 08:53

## 2021-09-29 NOTE — PROGRESS NOTES
Arrived to the [facility] [address] [facility] at the [facility]. Pt's K = 3.1 and mag = 1.5. One liter of NS, 40mEq of K, and 4G of mag completed. Patient tolerated without problems. Any issues or concerns during appointment: None. Patient aware of next infusion appointment on 10/1/2021 (date) at 36 (time). Discharged ambulatory with spouse.

## 2021-09-30 ENCOUNTER — HOSPITAL ENCOUNTER (OUTPATIENT)
Dept: RADIATION ONCOLOGY | Age: 76
Discharge: HOME | End: 2021-09-30
Payer: MEDICARE

## 2021-09-30 ENCOUNTER — APPOINTMENT (OUTPATIENT)
Dept: RADIATION ONCOLOGY | Age: 76
End: 2021-09-30

## 2021-09-30 PROCEDURE — 77386 HC IMRT TRMT DLVR COMPL: CPT

## 2021-10-01 ENCOUNTER — HOSPITAL ENCOUNTER (OUTPATIENT)
Dept: LAB | Age: 76
Discharge: HOME | End: 2021-10-01
Payer: MEDICARE

## 2021-10-01 ENCOUNTER — HOSPITAL ENCOUNTER (OUTPATIENT)
Dept: RADIATION ONCOLOGY | Age: 76
Discharge: HOME | End: 2021-10-01
Payer: MEDICARE

## 2021-10-01 ENCOUNTER — HOSPITAL ENCOUNTER (OUTPATIENT)
Dept: INFUSION THERAPY | Age: 76
Discharge: HOME | End: 2021-10-01
Payer: MEDICARE

## 2021-10-01 ENCOUNTER — APPOINTMENT (OUTPATIENT)
Dept: RADIATION ONCOLOGY | Age: 76
End: 2021-10-01
Payer: MEDICARE

## 2021-10-01 VITALS
TEMPERATURE: 98.1 F | BODY MASS INDEX: 21.92 KG/M2 | RESPIRATION RATE: 16 BRPM | OXYGEN SATURATION: 97 % | WEIGHT: 161.6 LBS | DIASTOLIC BLOOD PRESSURE: 71 MMHG | HEART RATE: 95 BPM | SYSTOLIC BLOOD PRESSURE: 141 MMHG

## 2021-10-01 DIAGNOSIS — E83.42 HYPOMAGNESEMIA: ICD-10-CM

## 2021-10-01 DIAGNOSIS — C01 SQUAMOUS CELL CARCINOMA OF BASE OF TONGUE (HCC): ICD-10-CM

## 2021-10-01 DIAGNOSIS — E87.6 HYPOKALEMIA: Primary | ICD-10-CM

## 2021-10-01 DIAGNOSIS — C01 CANCER OF BASE OF TONGUE (HCC): Primary | ICD-10-CM

## 2021-10-01 LAB
ALBUMIN SERPL-MCNC: 3.3 G/DL (ref 3.2–4.6)
ALBUMIN/GLOB SERPL: 0.9 {RATIO} (ref 1.2–3.5)
ALP SERPL-CCNC: 67 U/L (ref 50–136)
ALT SERPL-CCNC: 40 U/L (ref 12–65)
ANION GAP SERPL CALC-SCNC: 10 MMOL/L (ref 7–16)
AST SERPL-CCNC: 28 U/L (ref 15–37)
BASOPHILS # BLD: 0 K/UL (ref 0–0.2)
BASOPHILS NFR BLD: 0 % (ref 0–2)
BILIRUB SERPL-MCNC: 0.8 MG/DL (ref 0.2–1.1)
BUN SERPL-MCNC: 16 MG/DL (ref 8–23)
CALCIUM SERPL-MCNC: 9.1 MG/DL (ref 8.3–10.4)
CHLORIDE SERPL-SCNC: 102 MMOL/L (ref 98–107)
CO2 SERPL-SCNC: 27 MMOL/L (ref 21–32)
CREAT SERPL-MCNC: 1.2 MG/DL (ref 0.8–1.5)
DIFFERENTIAL METHOD BLD: ABNORMAL
EOSINOPHIL # BLD: 0 K/UL (ref 0–0.8)
EOSINOPHIL NFR BLD: 1 % (ref 0.5–7.8)
ERYTHROCYTE [DISTWIDTH] IN BLOOD BY AUTOMATED COUNT: 13.9 % (ref 11.9–14.6)
GLOBULIN SER CALC-MCNC: 3.8 G/DL (ref 2.3–3.5)
GLUCOSE SERPL-MCNC: 106 MG/DL (ref 65–100)
HCT VFR BLD AUTO: 30.2 %
HGB BLD-MCNC: 10.4 G/DL (ref 13.6–17.2)
IMM GRANULOCYTES # BLD AUTO: 0 K/UL (ref 0–0.5)
IMM GRANULOCYTES NFR BLD AUTO: 1 % (ref 0–5)
LYMPHOCYTES # BLD: 0.2 K/UL (ref 0.5–4.6)
LYMPHOCYTES NFR BLD: 13 % (ref 13–44)
MAGNESIUM SERPL-MCNC: 1.6 MG/DL (ref 1.8–2.4)
MCH RBC QN AUTO: 29.8 PG (ref 26.1–32.9)
MCHC RBC AUTO-ENTMCNC: 34.4 G/DL (ref 31.4–35)
MCV RBC AUTO: 86.5 FL (ref 79.6–97.8)
MONOCYTES # BLD: 0.3 K/UL (ref 0.1–1.3)
MONOCYTES NFR BLD: 22 % (ref 4–12)
NEUTS SEG # BLD: 0.9 K/UL (ref 1.7–8.2)
NEUTS SEG NFR BLD: 64 % (ref 43–78)
NRBC # BLD: 0 K/UL (ref 0–0.2)
PLATELET # BLD AUTO: 131 K/UL (ref 150–450)
PMV BLD AUTO: 8.8 FL (ref 9.4–12.3)
POTASSIUM SERPL-SCNC: 3.2 MMOL/L (ref 3.5–5.1)
PROT SERPL-MCNC: 7.1 G/DL (ref 6.3–8.2)
RBC # BLD AUTO: 3.49 M/UL (ref 4.23–5.6)
SODIUM SERPL-SCNC: 139 MMOL/L (ref 136–145)
WBC # BLD AUTO: 1.5 K/UL (ref 4.3–11.1)

## 2021-10-01 PROCEDURE — 96365 THER/PROPH/DIAG IV INF INIT: CPT

## 2021-10-01 PROCEDURE — 96366 THER/PROPH/DIAG IV INF ADDON: CPT

## 2021-10-01 PROCEDURE — 74011250636 HC RX REV CODE- 250/636: Performed by: INTERNAL MEDICINE

## 2021-10-01 PROCEDURE — 36415 COLL VENOUS BLD VENIPUNCTURE: CPT

## 2021-10-01 PROCEDURE — 85025 COMPLETE CBC W/AUTO DIFF WBC: CPT

## 2021-10-01 PROCEDURE — 83735 ASSAY OF MAGNESIUM: CPT

## 2021-10-01 PROCEDURE — 77386 HC IMRT TRMT DLVR COMPL: CPT

## 2021-10-01 PROCEDURE — 80053 COMPREHEN METABOLIC PANEL: CPT

## 2021-10-01 RX ORDER — POTASSIUM CHLORIDE 29.8 MG/ML
20 INJECTION INTRAVENOUS ONCE
Status: DISCONTINUED | OUTPATIENT
Start: 2021-10-01 | End: 2021-10-01 | Stop reason: SDUPTHER

## 2021-10-01 RX ORDER — SODIUM CHLORIDE 0.9 % (FLUSH) 0.9 %
5 SYRINGE (ML) INJECTION AS NEEDED
Status: DISCONTINUED | OUTPATIENT
Start: 2021-10-01 | End: 2021-10-03 | Stop reason: HOSPADM

## 2021-10-01 RX ORDER — MAGNESIUM SULFATE HEPTAHYDRATE 40 MG/ML
2 INJECTION, SOLUTION INTRAVENOUS ONCE
Status: DISCONTINUED | OUTPATIENT
Start: 2021-10-01 | End: 2021-10-01 | Stop reason: SDUPTHER

## 2021-10-01 RX ADMIN — Medication 5 ML: at 13:32

## 2021-10-01 RX ADMIN — MAGNESIUM SULFATE HEPTAHYDRATE: 500 INJECTION, SOLUTION INTRAMUSCULAR; INTRAVENOUS at 11:28

## 2021-10-01 NOTE — PROGRESS NOTES
Patient: Annie Blas MRN: [id number]  SSN: [id number]    YOB: 1945  Age: 78 y.o. 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 Week 1:  D1C1 cisplatin 8/16/21, also received Covid booster. Mild nausea relieved with zofran/ compazine. No pain. Week 2: Tolerated second dose of cisplatin 8/23. Intermittent nausea and taste changes, supplementing nutrition with Ensure. Denies pain.    Week 3: no complaints  Week 4: no complaints  Week 5: notes nausea and regurgitation with solid foods; losing weight; only 1-2 ensure cans/day consumed  Week 6: weakness and weight loss, very low appetite; chemotherapy completed 9/20/21  Week 7: persistent nausea and low appetite, some burning sensation with swallowing    OBJECTIVE:  NAD; mild dermatitis; mild mucositis in OP  Visit Vitals  BP (!) 141/71 (BP 1 Location: Left upper arm, BP Patient Position: Sitting)   Pulse 95   Temp 98.1 °F (36.7 °C)   Resp 16   Wt 73.3 kg (161 lb 9.6 oz)   SpO2 97%   BMI 21.92 kg/m²       Lab Results   Component Value Date/Time    Sodium 136 09/27/2021 08:21 AM    Potassium 3.1 (L) 09/29/2021 08:59 AM    Chloride 96 (L) 09/27/2021 08:21 AM    CO2 29 09/27/2021 08:21 AM    Anion gap 11 09/27/2021 08:21 AM    Glucose 98 09/27/2021 08:21 AM    BUN 17 09/27/2021 08:21 AM    Creatinine 1.30 09/27/2021 08:21 AM    GFR est AA >60 09/27/2021 08:21 AM    GFR est non-AA 57 (L) 09/27/2021 08:21 AM    Calcium 8.9 09/27/2021 08:21 AM    Magnesium 1.5 (L) 09/29/2021 08:59 AM    Albumin 3.4 09/27/2021 08:21 AM    Protein, total 7.3 09/27/2021 08:21 AM    Globulin 3.9 (H) 09/27/2021 08:21 AM    A-G Ratio 0.9 (L) 09/27/2021 08:21 AM    ALT (SGPT) 58 09/27/2021 08:21 AM     Lab Results Component Value Date/Time    WBC 1.0 (LL) 09/24/2021 08:55 AM    HGB 10.0 (L) 09/24/2021 08:55 AM    HCT 28.6 09/24/2021 08:55 AM    PLATELET 91 (L) 62/79/6497 08:55 AM       ASSESSMENT and PLAN:  Khanh Ortega is tolerating radiation as anticipated for the current dose and fraction. We will continue on as planned with another treatment visit anticipated next week.    - Appreciate nutrition recommendations and reviewed with  Cesar Randy palliative care recommendations, continue moisturizer for skin care and baking soda rinses for oral care  - Reviewed taking Zofran for nausea with Compazine 30 minutes afterwards if symptoms do not improve, also discussed using Ativan as needed for breakthrough nausea  - MMW for mucositis  - Completes radiation 10/4/21, return in 2 weeks for skin and symptom check and 6 weeks with repeat CT neck    Benedict Nathan MD   October 1, 2021

## 2021-10-01 NOTE — PROGRESS NOTES
Pt arrived ambulatory. 1 liter NS with 20 KCL and 2 grams mag sulfate infused. Pt tolerated well. Pt aware of appt 10/4 at 0900. Discharged ambulatory, no distress noted.

## 2021-10-04 ENCOUNTER — HOSPITAL ENCOUNTER (OUTPATIENT)
Dept: INFUSION THERAPY | Age: 76
Discharge: HOME | End: 2021-10-04
Payer: MEDICARE

## 2021-10-04 ENCOUNTER — HOSPITAL ENCOUNTER (OUTPATIENT)
Dept: RADIATION ONCOLOGY | Age: 76
Discharge: HOME | End: 2021-10-04
Payer: MEDICARE

## 2021-10-04 ENCOUNTER — APPOINTMENT (OUTPATIENT)
Dept: RADIATION ONCOLOGY | Age: 76
End: 2021-10-04
Payer: MEDICARE

## 2021-10-04 VITALS
TEMPERATURE: 98.1 F | OXYGEN SATURATION: 98 % | SYSTOLIC BLOOD PRESSURE: 132 MMHG | DIASTOLIC BLOOD PRESSURE: 75 MMHG | HEART RATE: 103 BPM | RESPIRATION RATE: 18 BRPM

## 2021-10-04 DIAGNOSIS — E83.42 HYPOMAGNESEMIA: ICD-10-CM

## 2021-10-04 DIAGNOSIS — E87.6 HYPOKALEMIA: Primary | ICD-10-CM

## 2021-10-04 DIAGNOSIS — C01 SQUAMOUS CELL CARCINOMA OF BASE OF TONGUE (HCC): ICD-10-CM

## 2021-10-04 PROCEDURE — 77336 RADIATION PHYSICS CONSULT: CPT

## 2021-10-04 PROCEDURE — 74011250636 HC RX REV CODE- 250/636: Performed by: INTERNAL MEDICINE

## 2021-10-04 PROCEDURE — 77386 HC IMRT TRMT DLVR COMPL: CPT

## 2021-10-04 PROCEDURE — 96360 HYDRATION IV INFUSION INIT: CPT

## 2021-10-04 RX ORDER — SODIUM CHLORIDE 9 MG/ML
1000 INJECTION, SOLUTION INTRAVENOUS CONTINUOUS
Status: DISCONTINUED | OUTPATIENT
Start: 2021-10-04 | End: 2021-10-06 | Stop reason: HOSPADM

## 2021-10-04 RX ADMIN — SODIUM CHLORIDE 1000 ML/HR: 900 INJECTION, SOLUTION INTRAVENOUS at 09:35

## 2021-10-04 NOTE — THERAPY DISCHARGE
Jamey Morfin  : 1945  Primary: [employer]  Secondary:  [address]  at [facility]  [address] [facility], [address]  [phone]   [phone]        OUTPATIENT SPEECH LANGUAGE PATHOLOGY: Discontinuation Summary  ICD-10: Treatment Diagnosis: dysphagia, pharyngeal R 13.13  REFERRING PHYSICIAN: Nola Sawyer MD MD Orders: speech evaluate and treat   PAST MEDICAL HISTORY:    Mr. Adeel Tsai is a 78 y.o. male who  has a past medical history of Arthritis, Cancer of base of tongue ([facility] [address] High cholesterol, and Thyroid disease. He also has no past medical history of Chronic pain, Malignant hyperthermia due to anesthesia, or Pseudocholinesterase deficiency. He also  has a past surgical history that includes hx colonoscopy (10/26/2015); hx colonoscopy (10/26/2015); and hx heent (Right, 2021). MEDICAL/REFERRING DIAGNOSIS: Squamous cell carcinoma of base of tongue ([facility] [address] [C01]  DATE OF ONSET: 2021   PRIOR LEVEL OF FUNCTION: with spouse  PRECAUTIONS/ALLERGIES: Patient has no known allergies. ASSESSMENT:  Pt attended 5 sessions due to dysphagia. He has not been seen since his last session . He also has a MBS completed  with no penetration or aspiration observed and no pharyngeal residue observed. Called pt this date to determine if he plans to return to therapy. He reported he completed chemo and radiation this date. He reported his swallowing is still going well. Therefore, he was thinking he did not need to return. SLP in agreement as pt is independent with his home exercise program.  No goals have been targeted as a progress note was completed at his last session. ?????? ? ? This section established at most recent assessment??????????  PROBLEM LIST (Impairments causing functional limitations):  1.  Dysphagia   GOALS: (Goals have been discussed and agreed upon with patient.)  SHORT-TERM FUNCTIONAL GOALS:   Pt will complete laryngeal exercises with 80% accuracy to ensure maintenance of swallow function. Goal not targeted. Pt will complete tongue base exercises with 80% accuracy to ensure maintenance of swallow function. Goal not targeted. Pt will complete exercises a min of 5 days weekly to ensure maintenance of swallow function. Goal not targeted. Pt will use compensatory as indicated to ensure safe, adequate consumption of a po diet with only min cues required. Goal not targeted. Pt will participate in a MBS x1 to further assess swallow function. Goal not targeted. DISCHARGE GOALS:   1. Pt will tolerate least restrictive diet without signs/sx aspiration 100% for safe swallow function. Goal not re-assessed. PLAN OF CARE:  Discharge from [address] [place], [place] [address]Bjorn Thank you for this referral,  Zeynep Otto, MSP, CCC-SLP      SUBJECTIVE:  Pt cooperative in therapy. OBJECTIVE:  Objective Measure: Tool Used: National Outcomes Measurement System: Functional Communication Measures: SWALLOWING  Score:  Initial: 6 Most Recent: 4  (Date: 9/16/21 )   Interpretation of Tool: This measure describes the change in functional communication status subsequent to speech-language pathology treatment of patients with dysphagia.  o Level 1:  Individual is not able to swallow anything safely by mouth. All nutrition and hydration is received through non-oral means (e.g., nasogastric tube, PEG). o Level 2: Individual is not able to swallow safely by mouth for nutrition and hydration, but may take some consistency with consistent maximal cues in therapy only. Alternative method of feeding required. o Level 3:  Alternative method of feeding required as individual takes less than 50% of nutrition and hydration by mouth, and/or swallowing is safe with consistent use of moderate cues to use compensatory strategies and/or requires maximum diet restriction.   o Level 4:  Swallowing is safe, but usually requires moderate cues to use compensatory strategies, and/or the individual has moderate diet restrictions and/or still requires tube feeding and/or oral supplements. o Level 5:  Swallowing is safe with minimal diet restriction and/or occasionally requires minimal cueing to use compensatory strategies. The individual may occasionally self-cue. All nutrition and hydration needs are met by mouth at mealtime. o Level 6:  Swallowing is safe, and the individual eats and drinks independently and may rarely require minimal cueing. The individual usually self-cues when difficulty occurs. May need to avoid specific food items (e.g., popcorn and nuts), or require additional time (due to dysphagia). o Level 7: The individuals ability to eat independently is not limited by swallow function. Swallowing would be safe and efficient for all consistencies. Compensatory strategies are effectively used when needed.   Score Level 7 Level 6 Level 5 Level 4 Level 3 Level 2 Level 1   Modifier CH CI CJ CK CL CM CN       Donnie Valero, MSP, CCC-SLP        Visit Approval Visit # Therapist initials Date A NS / Cx < 24 hr >24 hr Cx Comments    1 RL 8/17 [x]  [] [] Initial evaluation    2 RL 8/27 [x] [] [] Tx    3 RL 9/2 [x] [] [] Tx    4 RL 9/8 [x] [] [] Tx    5 RL 9/16 [x] [] [] Tx       [] [] []        [] [] []        [] [] []        [] [] []        [] [] []        [] [] []        [] [] []        [] [] []        [] [] []        [] [] []        [] [] []        [] [] []        [] [] []

## 2021-10-04 NOTE — PROGRESS NOTES
Arrived to the [facility]. Hydration completed. Patient tolerated without problems. IV site flushed and wrapped. Any issues or concerns during appointment: no.  Patient aware of next infusion appointment on 10/6/21 (date) at 56 (time). Discharged ambulatory with spouse.

## 2021-10-06 ENCOUNTER — HOSPITAL ENCOUNTER (OUTPATIENT)
Dept: INFUSION THERAPY | Age: 76
Discharge: HOME | End: 2021-10-06
Payer: MEDICARE

## 2021-10-06 VITALS
OXYGEN SATURATION: 96 % | WEIGHT: 158.8 LBS | DIASTOLIC BLOOD PRESSURE: 64 MMHG | BODY MASS INDEX: 21.54 KG/M2 | SYSTOLIC BLOOD PRESSURE: 130 MMHG | HEART RATE: 91 BPM | TEMPERATURE: 98 F | RESPIRATION RATE: 19 BRPM

## 2021-10-06 DIAGNOSIS — E83.42 HYPOMAGNESEMIA: ICD-10-CM

## 2021-10-06 DIAGNOSIS — E87.6 HYPOKALEMIA: Primary | ICD-10-CM

## 2021-10-06 DIAGNOSIS — C01 SQUAMOUS CELL CARCINOMA OF BASE OF TONGUE (HCC): ICD-10-CM

## 2021-10-06 PROCEDURE — 74011250636 HC RX REV CODE- 250/636: Performed by: INTERNAL MEDICINE

## 2021-10-06 PROCEDURE — 96360 HYDRATION IV INFUSION INIT: CPT

## 2021-10-06 RX ORDER — SODIUM CHLORIDE 0.9 % (FLUSH) 0.9 %
10-30 SYRINGE (ML) INJECTION AS NEEDED
Status: DISCONTINUED | OUTPATIENT
Start: 2021-10-06 | End: 2021-10-08 | Stop reason: HOSPADM

## 2021-10-06 RX ORDER — SODIUM CHLORIDE 9 MG/ML
1000 INJECTION, SOLUTION INTRAVENOUS CONTINUOUS
Status: DISCONTINUED | OUTPATIENT
Start: 2021-10-06 | End: 2021-10-08 | Stop reason: HOSPADM

## 2021-10-06 RX ADMIN — Medication 10 ML: at 11:42

## 2021-10-06 RX ADMIN — SODIUM CHLORIDE 1000 ML/HR: 900 INJECTION, SOLUTION INTRAVENOUS at 10:40

## 2021-10-06 RX ADMIN — Medication 10 ML: at 10:36

## 2021-10-06 NOTE — PROGRESS NOTES
Arrived to the [facility]. Hydration completed. Patient tolerated without problems. IV site flushed and wrapped. Any issues or concerns during appointment: no.  Patient aware of next infusion appointment on 10/8/2021 (date) at 56 (time).   Discharged ambulatory with spouse.

## 2021-10-08 ENCOUNTER — HOSPITAL ENCOUNTER (OUTPATIENT)
Dept: INFUSION THERAPY | Age: 76
Discharge: HOME | End: 2021-10-08
Payer: MEDICARE

## 2021-10-08 ENCOUNTER — HOSPITAL ENCOUNTER (OUTPATIENT)
Dept: LAB | Age: 76
Discharge: HOME | End: 2021-10-08
Payer: MEDICARE

## 2021-10-08 VITALS
SYSTOLIC BLOOD PRESSURE: 115 MMHG | RESPIRATION RATE: 18 BRPM | OXYGEN SATURATION: 100 % | DIASTOLIC BLOOD PRESSURE: 74 MMHG | HEART RATE: 89 BPM

## 2021-10-08 DIAGNOSIS — E87.6 HYPOKALEMIA: Primary | ICD-10-CM

## 2021-10-08 DIAGNOSIS — E83.42 HYPOMAGNESEMIA: ICD-10-CM

## 2021-10-08 DIAGNOSIS — C01 SQUAMOUS CELL CARCINOMA OF BASE OF TONGUE (HCC): ICD-10-CM

## 2021-10-08 LAB
ALBUMIN SERPL-MCNC: 3.1 G/DL (ref 3.2–4.6)
ALBUMIN/GLOB SERPL: 0.8 {RATIO} (ref 1.2–3.5)
ALP SERPL-CCNC: 67 U/L (ref 50–136)
ALT SERPL-CCNC: 27 U/L (ref 12–65)
ANION GAP SERPL CALC-SCNC: 11 MMOL/L (ref 7–16)
AST SERPL-CCNC: 22 U/L (ref 15–37)
BASOPHILS # BLD: 0 K/UL (ref 0–0.2)
BASOPHILS NFR BLD: 0 % (ref 0–2)
BILIRUB SERPL-MCNC: 0.9 MG/DL (ref 0.2–1.1)
BUN SERPL-MCNC: 15 MG/DL (ref 8–23)
CALCIUM SERPL-MCNC: 8.6 MG/DL (ref 8.3–10.4)
CHLORIDE SERPL-SCNC: 96 MMOL/L (ref 98–107)
CO2 SERPL-SCNC: 31 MMOL/L (ref 21–32)
CREAT SERPL-MCNC: 1.2 MG/DL (ref 0.8–1.5)
DIFFERENTIAL METHOD BLD: ABNORMAL
EOSINOPHIL # BLD: 0 K/UL (ref 0–0.8)
EOSINOPHIL NFR BLD: 0 % (ref 0.5–7.8)
ERYTHROCYTE [DISTWIDTH] IN BLOOD BY AUTOMATED COUNT: 16 % (ref 11.9–14.6)
GLOBULIN SER CALC-MCNC: 3.8 G/DL (ref 2.3–3.5)
GLUCOSE SERPL-MCNC: 114 MG/DL (ref 65–100)
HCT VFR BLD AUTO: 30.2 %
HGB BLD-MCNC: 10.4 G/DL (ref 13.6–17.2)
IMM GRANULOCYTES # BLD AUTO: 0 K/UL (ref 0–0.5)
IMM GRANULOCYTES NFR BLD AUTO: 1 % (ref 0–5)
LYMPHOCYTES # BLD: 0.1 K/UL (ref 0.5–4.6)
LYMPHOCYTES NFR BLD: 5 % (ref 13–44)
MAGNESIUM SERPL-MCNC: 1.2 MG/DL (ref 1.8–2.4)
MCH RBC QN AUTO: 29.9 PG (ref 26.1–32.9)
MCHC RBC AUTO-ENTMCNC: 34.4 G/DL (ref 31.4–35)
MCV RBC AUTO: 86.8 FL (ref 79.6–97.8)
MONOCYTES # BLD: 0.8 K/UL (ref 0.1–1.3)
MONOCYTES NFR BLD: 29 % (ref 4–12)
NEUTS SEG # BLD: 1.8 K/UL (ref 1.7–8.2)
NEUTS SEG NFR BLD: 65 % (ref 43–78)
NRBC # BLD: 0 K/UL (ref 0–0.2)
PLATELET # BLD AUTO: 163 K/UL (ref 150–450)
PMV BLD AUTO: 8 FL (ref 9.4–12.3)
POTASSIUM SERPL-SCNC: 2.8 MMOL/L (ref 3.5–5.1)
PROT SERPL-MCNC: 6.9 G/DL (ref 6.3–8.2)
RBC # BLD AUTO: 3.48 M/UL (ref 4.23–5.6)
SODIUM SERPL-SCNC: 138 MMOL/L (ref 136–145)
WBC # BLD AUTO: 2.7 K/UL (ref 4.3–11.1)

## 2021-10-08 PROCEDURE — 74011250636 HC RX REV CODE- 250/636: Performed by: NURSE PRACTITIONER

## 2021-10-08 PROCEDURE — 85025 COMPLETE CBC W/AUTO DIFF WBC: CPT

## 2021-10-08 PROCEDURE — 80053 COMPREHEN METABOLIC PANEL: CPT

## 2021-10-08 PROCEDURE — 36415 COLL VENOUS BLD VENIPUNCTURE: CPT

## 2021-10-08 PROCEDURE — 96360 HYDRATION IV INFUSION INIT: CPT

## 2021-10-08 PROCEDURE — 83735 ASSAY OF MAGNESIUM: CPT

## 2021-10-08 RX ORDER — SODIUM CHLORIDE 9 MG/ML
1000 INJECTION, SOLUTION INTRAVENOUS CONTINUOUS
Status: DISCONTINUED | OUTPATIENT
Start: 2021-10-08 | End: 2021-10-10 | Stop reason: HOSPADM

## 2021-10-08 RX ADMIN — SODIUM CHLORIDE 1000 ML/HR: 9 INJECTION, SOLUTION INTRAVENOUS at 10:30

## 2021-10-08 NOTE — PROGRESS NOTES
Arrived to the [facility]. 1L NS completed. Patient tolerated well. Any issues or concerns during appointment: low Mg and K+. Notified pt's navigator, Oscar Ordaz. Nickie discussed poc with pt - will start po mag and K+. Rx downstairs in pharmacy. Patient aware of next infusion appointment on 10/12/21 (date) at 8:30am (time). Discharged ambulatory with spouse.

## 2021-10-12 ENCOUNTER — HOSPITAL ENCOUNTER (OUTPATIENT)
Dept: INFUSION THERAPY | Age: 76
Discharge: HOME | End: 2021-10-12
Payer: MEDICARE

## 2021-10-12 VITALS
OXYGEN SATURATION: 99 % | HEART RATE: 91 BPM | SYSTOLIC BLOOD PRESSURE: 134 MMHG | DIASTOLIC BLOOD PRESSURE: 71 MMHG | TEMPERATURE: 97.8 F | RESPIRATION RATE: 18 BRPM

## 2021-10-12 DIAGNOSIS — E83.42 HYPOMAGNESEMIA: ICD-10-CM

## 2021-10-12 DIAGNOSIS — R21 RASH: ICD-10-CM

## 2021-10-12 DIAGNOSIS — E87.6 HYPOKALEMIA: Primary | ICD-10-CM

## 2021-10-12 DIAGNOSIS — C01 SQUAMOUS CELL CARCINOMA OF BASE OF TONGUE (HCC): ICD-10-CM

## 2021-10-12 LAB
ALBUMIN SERPL-MCNC: 3.2 G/DL (ref 3.2–4.6)
ALBUMIN/GLOB SERPL: 0.8 {RATIO} (ref 1.2–3.5)
ALP SERPL-CCNC: 67 U/L (ref 50–136)
ALT SERPL-CCNC: 24 U/L (ref 12–65)
ANION GAP SERPL CALC-SCNC: 8 MMOL/L (ref 7–16)
AST SERPL-CCNC: 24 U/L (ref 15–37)
BILIRUB SERPL-MCNC: 0.7 MG/DL (ref 0.2–1.1)
BUN SERPL-MCNC: 14 MG/DL (ref 8–23)
CALCIUM SERPL-MCNC: 8.8 MG/DL (ref 8.3–10.4)
CHLORIDE SERPL-SCNC: 98 MMOL/L (ref 98–107)
CO2 SERPL-SCNC: 32 MMOL/L (ref 21–32)
CREAT SERPL-MCNC: 1.2 MG/DL (ref 0.8–1.5)
GLOBULIN SER CALC-MCNC: 4 G/DL (ref 2.3–3.5)
GLUCOSE SERPL-MCNC: 107 MG/DL (ref 65–100)
POTASSIUM SERPL-SCNC: 3.3 MMOL/L (ref 3.5–5.1)
PROT SERPL-MCNC: 7.2 G/DL (ref 6.3–8.2)
SODIUM SERPL-SCNC: 138 MMOL/L (ref 136–145)

## 2021-10-12 PROCEDURE — 96360 HYDRATION IV INFUSION INIT: CPT

## 2021-10-12 PROCEDURE — 74011250637 HC RX REV CODE- 250/637: Performed by: NURSE PRACTITIONER

## 2021-10-12 PROCEDURE — 74011250636 HC RX REV CODE- 250/636: Performed by: NURSE PRACTITIONER

## 2021-10-12 PROCEDURE — 80053 COMPREHEN METABOLIC PANEL: CPT

## 2021-10-12 RX ORDER — POTASSIUM CHLORIDE 750 MG/1
40 TABLET, EXTENDED RELEASE ORAL
Status: COMPLETED | OUTPATIENT
Start: 2021-10-12 | End: 2021-10-12

## 2021-10-12 RX ORDER — SODIUM CHLORIDE 9 MG/ML
1000 INJECTION, SOLUTION INTRAVENOUS CONTINUOUS
Status: DISCONTINUED | OUTPATIENT
Start: 2021-10-12 | End: 2021-10-14 | Stop reason: HOSPADM

## 2021-10-12 RX ADMIN — SODIUM CHLORIDE 1000 ML/HR: 900 INJECTION, SOLUTION INTRAVENOUS at 08:45

## 2021-10-12 RX ADMIN — POTASSIUM CHLORIDE 40 MEQ: 10 TABLET, EXTENDED RELEASE ORAL at 10:08

## 2021-10-12 NOTE — PROGRESS NOTES
Arrived to the [facility]. Assessment completed, labs drawn and reviewed. 1 liter of NS completed. Patient tolerated without problems  Potassium 3.3 Quin NP made aware. Potassium 40 Meq given po and patient instructed to continue oral potassium at home   Any issues or concerns during appointment: None  Instructed to call Dr Momo Hoyos  concerns  Patient aware of next infusion appointment on 10/15/21(date) at 8 AM (time). IV left in place and wrapped for next infusion  Discharged ambulatory with spouse

## 2021-10-15 ENCOUNTER — HOSPITAL ENCOUNTER (OUTPATIENT)
Dept: INFUSION THERAPY | Age: 76
Discharge: HOME | End: 2021-10-15
Payer: MEDICARE

## 2021-10-15 ENCOUNTER — HOSPITAL ENCOUNTER (OUTPATIENT)
Dept: LAB | Age: 76
Discharge: HOME | End: 2021-10-15
Payer: MEDICARE

## 2021-10-15 VITALS
HEART RATE: 90 BPM | TEMPERATURE: 98 F | SYSTOLIC BLOOD PRESSURE: 117 MMHG | DIASTOLIC BLOOD PRESSURE: 63 MMHG | RESPIRATION RATE: 18 BRPM | OXYGEN SATURATION: 100 %

## 2021-10-15 DIAGNOSIS — C01 SQUAMOUS CELL CARCINOMA OF BASE OF TONGUE (HCC): ICD-10-CM

## 2021-10-15 LAB
ALBUMIN SERPL-MCNC: 3 G/DL (ref 3.2–4.6)
ALBUMIN/GLOB SERPL: 0.7 {RATIO} (ref 1.2–3.5)
ALP SERPL-CCNC: 68 U/L (ref 50–136)
ALT SERPL-CCNC: 29 U/L (ref 12–65)
ANION GAP SERPL CALC-SCNC: 8 MMOL/L (ref 7–16)
AST SERPL-CCNC: 28 U/L (ref 15–37)
BASOPHILS # BLD: 0 K/UL (ref 0–0.2)
BASOPHILS NFR BLD: 1 % (ref 0–2)
BILIRUB SERPL-MCNC: 0.7 MG/DL (ref 0.2–1.1)
BUN SERPL-MCNC: 13 MG/DL (ref 8–23)
CALCIUM SERPL-MCNC: 8.9 MG/DL (ref 8.3–10.4)
CHLORIDE SERPL-SCNC: 100 MMOL/L (ref 98–107)
CO2 SERPL-SCNC: 29 MMOL/L (ref 21–32)
CREAT SERPL-MCNC: 1.3 MG/DL (ref 0.8–1.5)
DIFFERENTIAL METHOD BLD: ABNORMAL
EOSINOPHIL # BLD: 0 K/UL (ref 0–0.8)
EOSINOPHIL NFR BLD: 0 % (ref 0.5–7.8)
ERYTHROCYTE [DISTWIDTH] IN BLOOD BY AUTOMATED COUNT: 18.6 % (ref 11.9–14.6)
GLOBULIN SER CALC-MCNC: 4.1 G/DL (ref 2.3–3.5)
GLUCOSE SERPL-MCNC: 111 MG/DL (ref 65–100)
HCT VFR BLD AUTO: 30.2 %
HGB BLD-MCNC: 10.2 G/DL (ref 13.6–17.2)
IMM GRANULOCYTES # BLD AUTO: 0 K/UL (ref 0–0.5)
IMM GRANULOCYTES NFR BLD AUTO: 1 % (ref 0–5)
LYMPHOCYTES # BLD: 0.2 K/UL (ref 0.5–4.6)
LYMPHOCYTES NFR BLD: 6 % (ref 13–44)
MAGNESIUM SERPL-MCNC: 1.3 MG/DL (ref 1.8–2.4)
MCH RBC QN AUTO: 30.6 PG (ref 26.1–32.9)
MCHC RBC AUTO-ENTMCNC: 33.8 G/DL (ref 31.4–35)
MCV RBC AUTO: 90.7 FL (ref 79.6–97.8)
MONOCYTES # BLD: 0.7 K/UL (ref 0.1–1.3)
MONOCYTES NFR BLD: 24 % (ref 4–12)
NEUTS SEG # BLD: 2 K/UL (ref 1.7–8.2)
NEUTS SEG NFR BLD: 68 % (ref 43–78)
NRBC # BLD: 0 K/UL (ref 0–0.2)
PLATELET # BLD AUTO: 184 K/UL (ref 150–450)
PMV BLD AUTO: 8.7 FL (ref 9.4–12.3)
POTASSIUM SERPL-SCNC: 4.2 MMOL/L (ref 3.5–5.1)
PROT SERPL-MCNC: 7.1 G/DL (ref 6.3–8.2)
RBC # BLD AUTO: 3.33 M/UL (ref 4.23–5.6)
SODIUM SERPL-SCNC: 137 MMOL/L (ref 136–145)
WBC # BLD AUTO: 3 K/UL (ref 4.3–11.1)

## 2021-10-15 PROCEDURE — 83735 ASSAY OF MAGNESIUM: CPT

## 2021-10-15 PROCEDURE — 36415 COLL VENOUS BLD VENIPUNCTURE: CPT

## 2021-10-15 PROCEDURE — 74011250636 HC RX REV CODE- 250/636: Performed by: INTERNAL MEDICINE

## 2021-10-15 PROCEDURE — 85025 COMPLETE CBC W/AUTO DIFF WBC: CPT

## 2021-10-15 PROCEDURE — 80053 COMPREHEN METABOLIC PANEL: CPT

## 2021-10-15 PROCEDURE — 96360 HYDRATION IV INFUSION INIT: CPT

## 2021-10-15 RX ADMIN — SODIUM CHLORIDE 1000 ML: 900 INJECTION, SOLUTION INTRAVENOUS at 08:15

## 2021-10-15 NOTE — PROGRESS NOTES
Arrived to the [facility]. 1L NS completed. Patient tolerated well. Any issues or concerns during appointment: none. No further infusion appts needed at this time. Discharged ambulatory.

## 2021-10-22 ENCOUNTER — HOSPITAL ENCOUNTER (OUTPATIENT)
Dept: LAB | Age: 76
Discharge: HOME | End: 2021-10-22
Payer: MEDICARE

## 2021-10-22 ENCOUNTER — TELEPHONE (OUTPATIENT)
Dept: CASE MANAGEMENT | Age: 76
End: 2021-10-22

## 2021-10-22 ENCOUNTER — HOSPITAL ENCOUNTER (OUTPATIENT)
Dept: RADIATION ONCOLOGY | Age: 76
Discharge: HOME | End: 2021-10-22
Payer: MEDICARE

## 2021-10-22 ENCOUNTER — HOSPITAL ENCOUNTER (OUTPATIENT)
Dept: INFUSION THERAPY | Age: 76
Discharge: HOME | End: 2021-10-22
Payer: MEDICARE

## 2021-10-22 DIAGNOSIS — E83.42 HYPOMAGNESEMIA: ICD-10-CM

## 2021-10-22 DIAGNOSIS — E87.6 HYPOKALEMIA: ICD-10-CM

## 2021-10-22 DIAGNOSIS — C01 SQUAMOUS CELL CARCINOMA OF BASE OF TONGUE (HCC): Primary | ICD-10-CM

## 2021-10-22 DIAGNOSIS — D37.02 NEOPLASM OF UNCERTAIN BEHAVIOR OF BASE OF TONGUE: ICD-10-CM

## 2021-10-22 LAB
ALBUMIN SERPL-MCNC: 3.3 G/DL (ref 3.2–4.6)
ALBUMIN/GLOB SERPL: 0.8 {RATIO} (ref 1.2–3.5)
ALP SERPL-CCNC: 71 U/L (ref 50–136)
ALT SERPL-CCNC: 38 U/L (ref 12–65)
ANION GAP SERPL CALC-SCNC: 6 MMOL/L (ref 7–16)
AST SERPL-CCNC: 27 U/L (ref 15–37)
BASOPHILS # BLD: 0 K/UL (ref 0–0.2)
BASOPHILS NFR BLD: 1 % (ref 0–2)
BILIRUB SERPL-MCNC: 0.7 MG/DL (ref 0.2–1.1)
BUN SERPL-MCNC: 19 MG/DL (ref 8–23)
CALCIUM SERPL-MCNC: 10 MG/DL (ref 8.3–10.4)
CHLORIDE SERPL-SCNC: 100 MMOL/L (ref 98–107)
CO2 SERPL-SCNC: 27 MMOL/L (ref 21–32)
CREAT SERPL-MCNC: 1.7 MG/DL (ref 0.8–1.5)
DIFFERENTIAL METHOD BLD: ABNORMAL
EOSINOPHIL # BLD: 0 K/UL (ref 0–0.8)
EOSINOPHIL NFR BLD: 1 % (ref 0.5–7.8)
ERYTHROCYTE [DISTWIDTH] IN BLOOD BY AUTOMATED COUNT: 19.6 % (ref 11.9–14.6)
GLOBULIN SER CALC-MCNC: 4.2 G/DL (ref 2.3–3.5)
GLUCOSE SERPL-MCNC: 113 MG/DL (ref 65–100)
HCT VFR BLD AUTO: 31.5 %
HGB BLD-MCNC: 10.8 G/DL (ref 13.6–17.2)
IMM GRANULOCYTES # BLD AUTO: 0 K/UL (ref 0–0.5)
IMM GRANULOCYTES NFR BLD AUTO: 1 % (ref 0–5)
LYMPHOCYTES # BLD: 0.2 K/UL (ref 0.5–4.6)
LYMPHOCYTES NFR BLD: 5 % (ref 13–44)
MAGNESIUM SERPL-MCNC: 1.7 MG/DL (ref 1.8–2.4)
MCH RBC QN AUTO: 31.2 PG (ref 26.1–32.9)
MCHC RBC AUTO-ENTMCNC: 34.3 G/DL (ref 31.4–35)
MCV RBC AUTO: 91 FL (ref 79.6–97.8)
MONOCYTES # BLD: 0.8 K/UL (ref 0.1–1.3)
MONOCYTES NFR BLD: 21 % (ref 4–12)
NEUTS SEG # BLD: 2.8 K/UL (ref 1.7–8.2)
NEUTS SEG NFR BLD: 72 % (ref 43–78)
NRBC # BLD: 0 K/UL (ref 0–0.2)
PLATELET # BLD AUTO: 212 K/UL (ref 150–450)
PMV BLD AUTO: 8.6 FL (ref 9.4–12.3)
POTASSIUM SERPL-SCNC: 4.9 MMOL/L (ref 3.5–5.1)
PROT SERPL-MCNC: 7.5 G/DL (ref 6.3–8.2)
RBC # BLD AUTO: 3.46 M/UL (ref 4.23–5.6)
SODIUM SERPL-SCNC: 133 MMOL/L (ref 136–145)
WBC # BLD AUTO: 3.9 K/UL (ref 4.3–11.1)

## 2021-10-22 PROCEDURE — 96360 HYDRATION IV INFUSION INIT: CPT

## 2021-10-22 PROCEDURE — 74011250636 HC RX REV CODE- 250/636: Performed by: NURSE PRACTITIONER

## 2021-10-22 PROCEDURE — 85025 COMPLETE CBC W/AUTO DIFF WBC: CPT

## 2021-10-22 PROCEDURE — 80053 COMPREHEN METABOLIC PANEL: CPT

## 2021-10-22 PROCEDURE — 36415 COLL VENOUS BLD VENIPUNCTURE: CPT

## 2021-10-22 PROCEDURE — 83735 ASSAY OF MAGNESIUM: CPT

## 2021-10-22 RX ORDER — SODIUM CHLORIDE 0.9 % (FLUSH) 0.9 %
10 SYRINGE (ML) INJECTION ONCE
Status: COMPLETED | OUTPATIENT
Start: 2021-10-22 | End: 2021-10-22

## 2021-10-22 RX ORDER — SODIUM CHLORIDE 9 MG/ML
1000 INJECTION, SOLUTION INTRAVENOUS CONTINUOUS
Status: DISCONTINUED | OUTPATIENT
Start: 2021-10-22 | End: 2021-10-24 | Stop reason: HOSPADM

## 2021-10-22 RX ADMIN — Medication 10 ML: at 16:31

## 2021-10-22 RX ADMIN — SODIUM CHLORIDE 1000 ML/HR: 9 INJECTION, SOLUTION INTRAVENOUS at 15:30

## 2021-10-22 NOTE — TELEPHONE ENCOUNTER
Pt came to  [facility] today for his 2 week skin check post RT to the head and neck--his skin is healed with no open areas or blisters.

## 2021-10-22 NOTE — PROGRESS NOTES
Arrived to the [facility]. 1 liter NS completed. Patient tolerated well. Any issues or concerns during appointment: none. Patient aware of next appointment with MD. No future infusion appointments at this time. Discharged via ambulatory.

## 2021-11-05 ENCOUNTER — HOSPITAL ENCOUNTER (OUTPATIENT)
Dept: LAB | Age: 76
Discharge: HOME | End: 2021-11-05
Payer: MEDICARE

## 2021-11-05 DIAGNOSIS — C01 SQUAMOUS CELL CARCINOMA OF BASE OF TONGUE (HCC): ICD-10-CM

## 2021-11-05 LAB
ALBUMIN SERPL-MCNC: 3.5 G/DL (ref 3.2–4.6)
ALBUMIN/GLOB SERPL: 0.9 {RATIO} (ref 1.2–3.5)
ALP SERPL-CCNC: 59 U/L (ref 50–136)
ALT SERPL-CCNC: 24 U/L (ref 12–65)
ANION GAP SERPL CALC-SCNC: 5 MMOL/L (ref 7–16)
AST SERPL-CCNC: 18 U/L (ref 15–37)
BASOPHILS # BLD: 0 K/UL (ref 0–0.2)
BASOPHILS NFR BLD: 1 % (ref 0–2)
BILIRUB SERPL-MCNC: 0.7 MG/DL (ref 0.2–1.1)
BUN SERPL-MCNC: 18 MG/DL (ref 8–23)
CALCIUM SERPL-MCNC: 9.5 MG/DL (ref 8.3–10.4)
CHLORIDE SERPL-SCNC: 102 MMOL/L (ref 98–107)
CO2 SERPL-SCNC: 27 MMOL/L (ref 21–32)
CREAT SERPL-MCNC: 1.5 MG/DL (ref 0.8–1.5)
DIFFERENTIAL METHOD BLD: ABNORMAL
EOSINOPHIL # BLD: 0 K/UL (ref 0–0.8)
EOSINOPHIL NFR BLD: 1 % (ref 0.5–7.8)
ERYTHROCYTE [DISTWIDTH] IN BLOOD BY AUTOMATED COUNT: 19.6 % (ref 11.9–14.6)
GLOBULIN SER CALC-MCNC: 3.8 G/DL (ref 2.3–3.5)
GLUCOSE SERPL-MCNC: 89 MG/DL (ref 65–100)
HCT VFR BLD AUTO: 30.4 %
HGB BLD-MCNC: 10.3 G/DL (ref 13.6–17.2)
IMM GRANULOCYTES # BLD AUTO: 0 K/UL (ref 0–0.5)
IMM GRANULOCYTES NFR BLD AUTO: 0 % (ref 0–5)
LYMPHOCYTES # BLD: 0.2 K/UL (ref 0.5–4.6)
LYMPHOCYTES NFR BLD: 7 % (ref 13–44)
MAGNESIUM SERPL-MCNC: 2 MG/DL (ref 1.8–2.4)
MCH RBC QN AUTO: 31.5 PG (ref 26.1–32.9)
MCHC RBC AUTO-ENTMCNC: 33.9 G/DL (ref 31.4–35)
MCV RBC AUTO: 93 FL (ref 79.6–97.8)
MONOCYTES # BLD: 0.6 K/UL (ref 0.1–1.3)
MONOCYTES NFR BLD: 22 % (ref 4–12)
NEUTS SEG # BLD: 2 K/UL (ref 1.7–8.2)
NEUTS SEG NFR BLD: 69 % (ref 43–78)
NRBC # BLD: 0 K/UL (ref 0–0.2)
PLATELET # BLD AUTO: 178 K/UL (ref 150–450)
PMV BLD AUTO: 8.4 FL (ref 9.4–12.3)
POTASSIUM SERPL-SCNC: 5.2 MMOL/L (ref 3.5–5.1)
PROT SERPL-MCNC: 7.3 G/DL (ref 6.3–8.2)
RBC # BLD AUTO: 3.27 M/UL (ref 4.23–5.6)
SODIUM SERPL-SCNC: 134 MMOL/L (ref 136–145)
WBC # BLD AUTO: 2.9 K/UL (ref 4.3–11.1)

## 2021-11-05 PROCEDURE — 83735 ASSAY OF MAGNESIUM: CPT

## 2021-11-05 PROCEDURE — 85025 COMPLETE CBC W/AUTO DIFF WBC: CPT

## 2021-11-05 PROCEDURE — 36415 COLL VENOUS BLD VENIPUNCTURE: CPT

## 2021-11-05 PROCEDURE — 80053 COMPREHEN METABOLIC PANEL: CPT

## 2021-11-16 ENCOUNTER — HOSPITAL ENCOUNTER (OUTPATIENT)
Dept: CT IMAGING | Age: 76
Discharge: HOME | End: 2021-11-16
Attending: NURSE PRACTITIONER
Payer: MEDICARE

## 2021-11-16 DIAGNOSIS — C01 CANCER OF BASE OF TONGUE (HCC): ICD-10-CM

## 2021-11-16 PROCEDURE — 74011000636 HC RX REV CODE- 636: Performed by: NURSE PRACTITIONER

## 2021-11-16 PROCEDURE — 74011000258 HC RX REV CODE- 258: Performed by: NURSE PRACTITIONER

## 2021-11-16 PROCEDURE — 70491 CT SOFT TISSUE NECK W/DYE: CPT

## 2021-11-16 RX ORDER — SODIUM CHLORIDE 0.9 % (FLUSH) 0.9 %
10 SYRINGE (ML) INJECTION
Status: COMPLETED | OUTPATIENT
Start: 2021-11-16 | End: 2021-11-16

## 2021-11-16 RX ADMIN — SODIUM CHLORIDE 100 ML: 900 INJECTION, SOLUTION INTRAVENOUS at 09:31

## 2021-11-16 RX ADMIN — IOPAMIDOL 80 ML: 755 INJECTION, SOLUTION INTRAVENOUS at 09:30

## 2021-11-16 RX ADMIN — Medication 10 ML: at 09:31

## 2021-11-22 ENCOUNTER — HOSPITAL ENCOUNTER (OUTPATIENT)
Dept: LAB | Age: 76
Discharge: HOME | End: 2021-11-22
Payer: MEDICARE

## 2021-11-22 DIAGNOSIS — C01 CANCER OF BASE OF TONGUE (HCC): ICD-10-CM

## 2021-11-22 DIAGNOSIS — T66.XXXA ADVERSE EFFECT OF RADIATION, INITIAL ENCOUNTER: ICD-10-CM

## 2021-11-22 LAB
ALBUMIN SERPL-MCNC: 3.5 G/DL (ref 3.2–4.6)
ALBUMIN/GLOB SERPL: 1 {RATIO} (ref 1.2–3.5)
ALP SERPL-CCNC: 56 U/L (ref 50–136)
ALT SERPL-CCNC: 15 U/L (ref 12–65)
ANION GAP SERPL CALC-SCNC: 8 MMOL/L (ref 7–16)
AST SERPL-CCNC: 15 U/L (ref 15–37)
BASOPHILS # BLD: 0 K/UL (ref 0–0.2)
BASOPHILS NFR BLD: 1 % (ref 0–2)
BILIRUB SERPL-MCNC: 0.8 MG/DL (ref 0.2–1.1)
BUN SERPL-MCNC: 14 MG/DL (ref 8–23)
CALCIUM SERPL-MCNC: 9.5 MG/DL (ref 8.3–10.4)
CHLORIDE SERPL-SCNC: 102 MMOL/L (ref 98–107)
CO2 SERPL-SCNC: 27 MMOL/L (ref 21–32)
CREAT SERPL-MCNC: 1.2 MG/DL (ref 0.8–1.5)
DIFFERENTIAL METHOD BLD: ABNORMAL
EOSINOPHIL # BLD: 0.1 K/UL (ref 0–0.8)
EOSINOPHIL NFR BLD: 2 % (ref 0.5–7.8)
ERYTHROCYTE [DISTWIDTH] IN BLOOD BY AUTOMATED COUNT: 17.3 % (ref 11.9–14.6)
GLOBULIN SER CALC-MCNC: 3.5 G/DL (ref 2.3–3.5)
GLUCOSE SERPL-MCNC: 108 MG/DL (ref 65–100)
HCT VFR BLD AUTO: 31.1 %
HGB BLD-MCNC: 10.7 G/DL (ref 13.6–17.2)
IMM GRANULOCYTES # BLD AUTO: 0 K/UL (ref 0–0.5)
IMM GRANULOCYTES NFR BLD AUTO: 0 % (ref 0–5)
LYMPHOCYTES # BLD: 0.3 K/UL (ref 0.5–4.6)
LYMPHOCYTES NFR BLD: 14 % (ref 13–44)
MAGNESIUM SERPL-MCNC: 2.1 MG/DL (ref 1.8–2.4)
MCH RBC QN AUTO: 32.7 PG (ref 26.1–32.9)
MCHC RBC AUTO-ENTMCNC: 34.4 G/DL (ref 31.4–35)
MCV RBC AUTO: 95.1 FL (ref 79.6–97.8)
MONOCYTES # BLD: 0.4 K/UL (ref 0.1–1.3)
MONOCYTES NFR BLD: 18 % (ref 4–12)
NEUTS SEG # BLD: 1.4 K/UL (ref 1.7–8.2)
NEUTS SEG NFR BLD: 65 % (ref 43–78)
NRBC # BLD: 0 K/UL (ref 0–0.2)
PLATELET # BLD AUTO: 166 K/UL (ref 150–450)
PMV BLD AUTO: 8.3 FL (ref 9.4–12.3)
POTASSIUM SERPL-SCNC: 3.6 MMOL/L (ref 3.5–5.1)
PROT SERPL-MCNC: 7 G/DL (ref 6.3–8.2)
RBC # BLD AUTO: 3.27 M/UL (ref 4.23–5.6)
SODIUM SERPL-SCNC: 137 MMOL/L (ref 136–145)
T4 FREE SERPL-MCNC: 1.2 NG/DL (ref 0.78–1.46)
TSH SERPL DL<=0.005 MIU/L-ACNC: 5.55 UIU/ML (ref 0.36–3.74)
WBC # BLD AUTO: 2.2 K/UL (ref 4.3–11.1)

## 2021-11-22 PROCEDURE — 83735 ASSAY OF MAGNESIUM: CPT

## 2021-11-22 PROCEDURE — 84439 ASSAY OF FREE THYROXINE: CPT

## 2021-11-22 PROCEDURE — 80053 COMPREHEN METABOLIC PANEL: CPT

## 2021-11-22 PROCEDURE — 85025 COMPLETE CBC W/AUTO DIFF WBC: CPT

## 2021-11-22 PROCEDURE — 84443 ASSAY THYROID STIM HORMONE: CPT

## 2021-11-22 PROCEDURE — 36415 COLL VENOUS BLD VENIPUNCTURE: CPT

## 2021-12-20 ENCOUNTER — HOSPITAL ENCOUNTER (OUTPATIENT)
Dept: LAB | Age: 76
Discharge: HOME | End: 2021-12-20
Payer: MEDICARE

## 2021-12-20 DIAGNOSIS — R60.0 EDEMA OF BOTH LEGS: ICD-10-CM

## 2021-12-20 DIAGNOSIS — C01 CANCER OF BASE OF TONGUE (HCC): ICD-10-CM

## 2021-12-20 LAB
ALBUMIN SERPL-MCNC: 3.4 G/DL (ref 3.2–4.6)
ALBUMIN/GLOB SERPL: 1 {RATIO} (ref 1.2–3.5)
ALP SERPL-CCNC: 57 U/L (ref 50–136)
ALT SERPL-CCNC: 21 U/L (ref 12–65)
ANION GAP SERPL CALC-SCNC: 5 MMOL/L (ref 7–16)
AST SERPL-CCNC: 17 U/L (ref 15–37)
BASOPHILS # BLD: 0 K/UL (ref 0–0.2)
BASOPHILS NFR BLD: 1 % (ref 0–2)
BILIRUB SERPL-MCNC: 0.4 MG/DL (ref 0.2–1.1)
BUN SERPL-MCNC: 16 MG/DL (ref 8–23)
CALCIUM SERPL-MCNC: 9.4 MG/DL (ref 8.3–10.4)
CHLORIDE SERPL-SCNC: 107 MMOL/L (ref 98–107)
CO2 SERPL-SCNC: 30 MMOL/L (ref 21–32)
CREAT SERPL-MCNC: 1 MG/DL (ref 0.8–1.5)
DIFFERENTIAL METHOD BLD: ABNORMAL
EOSINOPHIL # BLD: 0.1 K/UL (ref 0–0.8)
EOSINOPHIL NFR BLD: 3 % (ref 0.5–7.8)
ERYTHROCYTE [DISTWIDTH] IN BLOOD BY AUTOMATED COUNT: 12.9 % (ref 11.9–14.6)
GLOBULIN SER CALC-MCNC: 3.5 G/DL (ref 2.3–3.5)
GLUCOSE SERPL-MCNC: 91 MG/DL (ref 65–100)
HCT VFR BLD AUTO: 29 %
HGB BLD-MCNC: 9.7 G/DL (ref 13.6–17.2)
IMM GRANULOCYTES # BLD AUTO: 0 K/UL (ref 0–0.5)
IMM GRANULOCYTES NFR BLD AUTO: 0 % (ref 0–5)
LYMPHOCYTES # BLD: 0.3 K/UL (ref 0.5–4.6)
LYMPHOCYTES NFR BLD: 12 % (ref 13–44)
MAGNESIUM SERPL-MCNC: 2.3 MG/DL (ref 1.8–2.4)
MCH RBC QN AUTO: 33.9 PG (ref 26.1–32.9)
MCHC RBC AUTO-ENTMCNC: 33.4 G/DL (ref 31.4–35)
MCV RBC AUTO: 101.4 FL (ref 79.6–97.8)
MONOCYTES # BLD: 0.5 K/UL (ref 0.1–1.3)
MONOCYTES NFR BLD: 19 % (ref 4–12)
NEUTS SEG # BLD: 1.8 K/UL (ref 1.7–8.2)
NEUTS SEG NFR BLD: 66 % (ref 43–78)
NRBC # BLD: 0 K/UL (ref 0–0.2)
PLATELET # BLD AUTO: 138 K/UL (ref 150–450)
PMV BLD AUTO: 8.1 FL (ref 9.4–12.3)
POTASSIUM SERPL-SCNC: 3.7 MMOL/L (ref 3.5–5.1)
PROT SERPL-MCNC: 6.9 G/DL (ref 6.3–8.2)
RBC # BLD AUTO: 2.86 M/UL (ref 4.23–5.6)
SODIUM SERPL-SCNC: 142 MMOL/L (ref 136–145)
WBC # BLD AUTO: 2.7 K/UL (ref 4.3–11.1)

## 2021-12-20 PROCEDURE — 36415 COLL VENOUS BLD VENIPUNCTURE: CPT

## 2021-12-20 PROCEDURE — 83735 ASSAY OF MAGNESIUM: CPT

## 2021-12-20 PROCEDURE — 80053 COMPREHEN METABOLIC PANEL: CPT

## 2021-12-20 PROCEDURE — 85025 COMPLETE CBC W/AUTO DIFF WBC: CPT

## 2021-12-29 ENCOUNTER — HOSPITAL ENCOUNTER (OUTPATIENT)
Dept: PET IMAGING | Age: 76
Discharge: HOME | End: 2021-12-29
Attending: INTERNAL MEDICINE
Payer: MEDICARE

## 2021-12-29 DIAGNOSIS — C01 CANCER OF BASE OF TONGUE (HCC): ICD-10-CM

## 2021-12-29 LAB
GLUCOSE BLD STRIP.AUTO-MCNC: 90 MG/DL (ref 65–100)
SERVICE CMNT-IMP: NORMAL

## 2021-12-29 PROCEDURE — 82962 GLUCOSE BLOOD TEST: CPT

## 2021-12-29 PROCEDURE — A9552 F18 FDG: HCPCS

## 2021-12-29 PROCEDURE — 74011000636 HC RX REV CODE- 636: Performed by: INTERNAL MEDICINE

## 2021-12-29 RX ORDER — SODIUM CHLORIDE 0.9 % (FLUSH) 0.9 %
10 SYRINGE (ML) INJECTION
Status: COMPLETED | OUTPATIENT
Start: 2021-12-29 | End: 2021-12-29

## 2021-12-29 RX ORDER — FLUDEOXYGLUCOSE F-18 200 MCI/ML
10 INJECTION INTRAVENOUS ONCE
Status: COMPLETED | OUTPATIENT
Start: 2021-12-29 | End: 2021-12-29

## 2021-12-29 RX ADMIN — Medication 20 ML: at 08:57

## 2021-12-29 RX ADMIN — DIATRIZOATE MEGLUMINE AND DIATRIZOATE SODIUM 10 ML: 660; 100 LIQUID ORAL; RECTAL at 08:57

## 2021-12-29 RX ADMIN — FLUDEOXYGLUCOSE F-18 13.4 MILLICURIE: 200 INJECTION INTRAVENOUS at 08:57

## 2022-01-07 ENCOUNTER — HOSPITAL ENCOUNTER (OUTPATIENT)
Dept: LAB | Age: 77
Discharge: HOME | End: 2022-01-07
Payer: MEDICARE

## 2022-01-07 DIAGNOSIS — C01 CANCER OF BASE OF TONGUE (HCC): ICD-10-CM

## 2022-01-07 DIAGNOSIS — T66.XXXA ADVERSE EFFECT OF RADIATION, INITIAL ENCOUNTER: ICD-10-CM

## 2022-01-07 LAB
ALBUMIN SERPL-MCNC: 3.6 G/DL (ref 3.2–4.6)
ALBUMIN/GLOB SERPL: 1 {RATIO} (ref 1.2–3.5)
ALP SERPL-CCNC: 67 U/L (ref 50–136)
ALT SERPL-CCNC: 18 U/L (ref 12–65)
ANION GAP SERPL CALC-SCNC: 5 MMOL/L (ref 7–16)
AST SERPL-CCNC: 16 U/L (ref 15–37)
BASOPHILS # BLD: 0 K/UL (ref 0–0.2)
BASOPHILS NFR BLD: 1 % (ref 0–2)
BILIRUB SERPL-MCNC: 0.4 MG/DL (ref 0.2–1.1)
BUN SERPL-MCNC: 18 MG/DL (ref 8–23)
CALCIUM SERPL-MCNC: 9.3 MG/DL (ref 8.3–10.4)
CHLORIDE SERPL-SCNC: 107 MMOL/L (ref 98–107)
CO2 SERPL-SCNC: 28 MMOL/L (ref 21–32)
CREAT SERPL-MCNC: 1.1 MG/DL (ref 0.8–1.5)
DIFFERENTIAL METHOD BLD: ABNORMAL
EOSINOPHIL # BLD: 0.1 K/UL (ref 0–0.8)
EOSINOPHIL NFR BLD: 2 % (ref 0.5–7.8)
ERYTHROCYTE [DISTWIDTH] IN BLOOD BY AUTOMATED COUNT: 11.9 % (ref 11.9–14.6)
GLOBULIN SER CALC-MCNC: 3.7 G/DL (ref 2.3–3.5)
GLUCOSE SERPL-MCNC: 88 MG/DL (ref 65–100)
HCT VFR BLD AUTO: 30.6 %
HGB BLD-MCNC: 10.5 G/DL (ref 13.6–17.2)
IMM GRANULOCYTES # BLD AUTO: 0 K/UL (ref 0–0.5)
IMM GRANULOCYTES NFR BLD AUTO: 0 % (ref 0–5)
LYMPHOCYTES # BLD: 0.4 K/UL (ref 0.5–4.6)
LYMPHOCYTES NFR BLD: 13 % (ref 13–44)
MAGNESIUM SERPL-MCNC: 2.2 MG/DL (ref 1.8–2.4)
MCH RBC QN AUTO: 33.8 PG (ref 26.1–32.9)
MCHC RBC AUTO-ENTMCNC: 34.3 G/DL (ref 31.4–35)
MCV RBC AUTO: 98.4 FL (ref 79.6–97.8)
MONOCYTES # BLD: 0.4 K/UL (ref 0.1–1.3)
MONOCYTES NFR BLD: 15 % (ref 4–12)
NEUTS SEG # BLD: 1.8 K/UL (ref 1.7–8.2)
NEUTS SEG NFR BLD: 69 % (ref 43–78)
NRBC # BLD: 0 K/UL (ref 0–0.2)
PLATELET # BLD AUTO: 142 K/UL (ref 150–450)
PMV BLD AUTO: 8.1 FL (ref 9.4–12.3)
POTASSIUM SERPL-SCNC: 3.7 MMOL/L (ref 3.5–5.1)
PROT SERPL-MCNC: 7.3 G/DL (ref 6.3–8.2)
RBC # BLD AUTO: 3.11 M/UL (ref 4.23–5.6)
SODIUM SERPL-SCNC: 140 MMOL/L (ref 136–145)
T4 FREE SERPL-MCNC: 1.1 NG/DL (ref 0.78–1.46)
TSH SERPL DL<=0.005 MIU/L-ACNC: 0.48 UIU/ML (ref 0.36–3.74)
WBC # BLD AUTO: 2.6 K/UL (ref 4.3–11.1)

## 2022-01-07 PROCEDURE — 83735 ASSAY OF MAGNESIUM: CPT

## 2022-01-07 PROCEDURE — 36415 COLL VENOUS BLD VENIPUNCTURE: CPT

## 2022-01-07 PROCEDURE — 84443 ASSAY THYROID STIM HORMONE: CPT

## 2022-01-07 PROCEDURE — 80053 COMPREHEN METABOLIC PANEL: CPT

## 2022-01-07 PROCEDURE — 85025 COMPLETE CBC W/AUTO DIFF WBC: CPT

## 2022-01-07 PROCEDURE — 84439 ASSAY OF FREE THYROXINE: CPT

## 2022-03-18 PROBLEM — E87.6 HYPOKALEMIA: Status: ACTIVE | Noted: 2021-09-24

## 2022-03-18 PROBLEM — R21 RASH: Status: ACTIVE | Noted: 2021-08-16

## 2022-03-19 PROBLEM — E78.00 PURE HYPERCHOLESTEROLEMIA: Status: ACTIVE | Noted: 2018-05-15

## 2022-03-19 PROBLEM — C01 SQUAMOUS CELL CARCINOMA OF BASE OF TONGUE (HCC): Status: ACTIVE | Noted: 2021-08-02

## 2022-03-19 PROBLEM — E83.42 HYPOMAGNESEMIA: Status: ACTIVE | Noted: 2021-09-24

## 2022-04-01 ENCOUNTER — HOSPITAL ENCOUNTER (OUTPATIENT)
Dept: LAB | Age: 77
Discharge: HOME | End: 2022-04-01
Payer: MEDICARE

## 2022-04-01 DIAGNOSIS — C01 SQUAMOUS CELL CARCINOMA OF BASE OF TONGUE (HCC): ICD-10-CM

## 2022-04-01 DIAGNOSIS — E03.9 ACQUIRED HYPOTHYROIDISM: ICD-10-CM

## 2022-04-01 LAB
ALBUMIN SERPL-MCNC: 3.7 G/DL (ref 3.2–4.6)
ALBUMIN/GLOB SERPL: 1 {RATIO} (ref 1.2–3.5)
ALP SERPL-CCNC: 61 U/L (ref 50–136)
ALT SERPL-CCNC: 19 U/L (ref 12–65)
ANION GAP SERPL CALC-SCNC: 6 MMOL/L (ref 7–16)
AST SERPL-CCNC: 18 U/L (ref 15–37)
BASOPHILS # BLD: 0.1 K/UL (ref 0–0.2)
BASOPHILS NFR BLD: 2 % (ref 0–2)
BILIRUB SERPL-MCNC: 0.6 MG/DL (ref 0.2–1.1)
BUN SERPL-MCNC: 25 MG/DL (ref 8–23)
CALCIUM SERPL-MCNC: 9.4 MG/DL (ref 8.3–10.4)
CHLORIDE SERPL-SCNC: 107 MMOL/L (ref 98–107)
CO2 SERPL-SCNC: 27 MMOL/L (ref 21–32)
CREAT SERPL-MCNC: 1.3 MG/DL (ref 0.8–1.5)
DIFFERENTIAL METHOD BLD: ABNORMAL
EOSINOPHIL # BLD: 0.2 K/UL (ref 0–0.8)
EOSINOPHIL NFR BLD: 5 % (ref 0.5–7.8)
ERYTHROCYTE [DISTWIDTH] IN BLOOD BY AUTOMATED COUNT: 12.6 % (ref 11.9–14.6)
GLOBULIN SER CALC-MCNC: 3.6 G/DL (ref 2.3–3.5)
GLUCOSE SERPL-MCNC: 80 MG/DL (ref 65–100)
HCT VFR BLD AUTO: 33.3 %
HGB BLD-MCNC: 11.2 G/DL (ref 13.6–17.2)
IMM GRANULOCYTES # BLD AUTO: 0 K/UL (ref 0–0.5)
IMM GRANULOCYTES NFR BLD AUTO: 0 % (ref 0–5)
LYMPHOCYTES # BLD: 0.4 K/UL (ref 0.5–4.6)
LYMPHOCYTES NFR BLD: 13 % (ref 13–44)
MAGNESIUM SERPL-MCNC: 2.2 MG/DL (ref 1.8–2.4)
MCH RBC QN AUTO: 32.2 PG (ref 26.1–32.9)
MCHC RBC AUTO-ENTMCNC: 33.6 G/DL (ref 31.4–35)
MCV RBC AUTO: 95.7 FL (ref 79.6–97.8)
MONOCYTES # BLD: 0.5 K/UL (ref 0.1–1.3)
MONOCYTES NFR BLD: 15 % (ref 4–12)
NEUTS SEG # BLD: 2.2 K/UL (ref 1.7–8.2)
NEUTS SEG NFR BLD: 65 % (ref 43–78)
NRBC # BLD: 0 K/UL (ref 0–0.2)
PLATELET # BLD AUTO: 146 K/UL (ref 150–450)
PMV BLD AUTO: 8.4 FL (ref 9.4–12.3)
POTASSIUM SERPL-SCNC: 4.1 MMOL/L (ref 3.5–5.1)
PROT SERPL-MCNC: 7.3 G/DL (ref 6.3–8.2)
RBC # BLD AUTO: 3.48 M/UL (ref 4.23–5.6)
SODIUM SERPL-SCNC: 140 MMOL/L (ref 136–145)
T4 FREE SERPL-MCNC: 0.9 NG/DL (ref 0.78–1.4)
TSH SERPL DL<=0.005 MIU/L-ACNC: 13.4 UIU/ML (ref 0.36–3)
WBC # BLD AUTO: 3.3 K/UL (ref 4.3–11.1)

## 2022-04-01 PROCEDURE — 85025 COMPLETE CBC W/AUTO DIFF WBC: CPT

## 2022-04-01 PROCEDURE — 36415 COLL VENOUS BLD VENIPUNCTURE: CPT

## 2022-04-01 PROCEDURE — 80053 COMPREHEN METABOLIC PANEL: CPT

## 2022-04-01 PROCEDURE — 84439 ASSAY OF FREE THYROXINE: CPT

## 2022-04-01 PROCEDURE — 84443 ASSAY THYROID STIM HORMONE: CPT

## 2022-04-01 PROCEDURE — 83735 ASSAY OF MAGNESIUM: CPT

## 2022-05-04 PROBLEM — I82.621 ACUTE DEEP VEIN THROMBOSIS (DVT) OF RIGHT UPPER EXTREMITY, UNSPECIFIED VEIN (HCC): Status: ACTIVE | Noted: 2022-05-04

## 2022-05-04 PROBLEM — D61.810 PANCYTOPENIA DUE TO ANTINEOPLASTIC CHEMOTHERAPY (HCC): Status: ACTIVE | Noted: 2022-05-04

## 2022-05-04 PROBLEM — T45.1X5A PANCYTOPENIA DUE TO ANTINEOPLASTIC CHEMOTHERAPY (HCC): Status: ACTIVE | Noted: 2022-05-04

## 2022-05-05 ENCOUNTER — APPOINTMENT (OUTPATIENT)
Dept: RADIATION ONCOLOGY | Age: 77
End: 2022-05-05

## 2022-06-06 DIAGNOSIS — E03.9 ACQUIRED HYPOTHYROIDISM: Primary | ICD-10-CM

## 2022-06-06 LAB — TSH W FREE THYROID IF ABNORMAL: 1.37 UIU/ML (ref 0.36–3.74)

## 2022-06-08 ENCOUNTER — TELEPHONE (OUTPATIENT)
Dept: FAMILY MEDICINE CLINIC | Facility: CLINIC | Age: 77
End: 2022-06-08

## 2022-06-08 RX ORDER — LEVOTHYROXINE SODIUM 0.1 MG/1
100 TABLET ORAL
Qty: 90 TABLET | Refills: 1 | Status: SHIPPED | OUTPATIENT
Start: 2022-06-08 | End: 2022-11-04 | Stop reason: SDUPTHER

## 2022-06-08 NOTE — TELEPHONE ENCOUNTER
----- Message from Renetta Roberts sent at 6/7/2022  3:53 PM EDT -----  Subject: Medication Problem    QUESTIONS  Name of Medication? levothyroxine (SYNTHROID) 75 MCG tablet  Patient-reported dosage and instructions? once daily in the AM  What question or problem do you have with the medication? pt is wanting to   know if this can be filled through the office instead as it cost his more   money and he can't afford it anymore. it is also upped to 100 MCG. Please   advise  Preferred Pharmacy? [facility], [address] [place] [address] [phone]  Pharmacy phone number (if available)? [phone]  Additional Information for Provider? please call if that is okay.   ---------------------------------------------------------------------------  --------------  CALL BACK INFO  What is the best way for the office to contact you? OK to leave message on   voicemail  Preferred Call Back Phone Number? [phone]  ---------------------------------------------------------------------------  --------------  SCRIPT ANSWERS  Relationship to Patient?  Self

## 2022-06-08 NOTE — TELEPHONE ENCOUNTER
I sent levothyroxine in to pharmacy. He cannot be seeing two providers for the same condition- [facility].

## 2022-06-08 NOTE — TELEPHONE ENCOUNTER
Spoke with patient & he stated the [place] had increased his Levothyroxine a couple of months ago to 100 mcg every day.  Do you want him to stay on this dose & if so he needs a new RX sent to [facility]

## 2022-06-08 NOTE — TELEPHONE ENCOUNTER
----- Message from Jayden Geiger sent at 6/7/2022  3:53 PM EDT -----  Subject: Medication Problem    QUESTIONS  Name of Medication? levothyroxine (SYNTHROID) 75 MCG tablet  Patient-reported dosage and instructions? once daily in the AM  What question or problem do you have with the medication? pt is wanting to   know if this can be filled through the office instead as it cost his more   money and he can't afford it anymore. it is also upped to 100 MCG. Please   advise  Preferred Pharmacy? [facility], [address] [place] [address] [phone]  Pharmacy phone number (if available)? [phone]  Additional Information for Provider? please call if that is okay.   ---------------------------------------------------------------------------  --------------  CALL BACK INFO  What is the best way for the office to contact you? OK to leave message on   voicemail  Preferred Call Back Phone Number? [phone]  ---------------------------------------------------------------------------  --------------  SCRIPT ANSWERS  Relationship to Patient?  Self

## 2022-06-09 ENCOUNTER — TELEPHONE (OUTPATIENT)
Dept: FAMILY MEDICINE CLINIC | Facility: CLINIC | Age: 77
End: 2022-06-09

## 2022-06-09 NOTE — TELEPHONE ENCOUNTER
----- Message from [employer] sent at 2022  1:12 PM EDT -----  Subject: Appointment Request    Reason for Call: New Patient Request Appointment    QUESTIONS  Type of Appointment? New Patient/New to Provider  Reason for appointment request? No appointments available during search  Additional Information for Provider? pt is a new pt and would like to be   seen at the [facility] location with Dr. Char Acevedo. they live 5 min away from that   office   ---------------------------------------------------------------------------  --------------  [address]  What is the best way for the office to contact you? OK to leave message on   voicemail  Preferred Call Back Phone Number? [phone]  ---------------------------------------------------------------------------  --------------  SCRIPT ANSWERS  Relationship to Patient? Self  Specialty Confirmation? Primary Care  Is this the first appointment to establish care for a ? No  Have you been diagnosed with, awaiting test results for, or told that you   are suspected of having COVID-19 (Coronavirus)? (If patient has tested   negative or was tested as a requirement for work, school, or travel and   not based on symptoms, answer no)? No  Within the past 10 days have you developed any of the following symptoms   (answer no if symptoms have been present longer than 10 days or began   more than 10 days ago)? Fever or Chills, Cough, Shortness of breath or   difficulty breathing, Loss of taste or smell, Sore throat, Nasal   congestion, Sneezing or runny nose, Fatigue or generalized body aches   (answer no if pain is specific to a body part e.g. back pain), Diarrhea,   Headache? No  Have you had close contact with someone with COVID-19 in the last 7 days? No  (Service Expert  click yes below to proceed with [employer] As Usual   Scheduling)?  Yes

## 2022-06-10 ENCOUNTER — OFFICE VISIT (OUTPATIENT)
Dept: ENT CLINIC | Age: 77
End: 2022-06-10
Payer: MEDICARE

## 2022-06-10 VITALS — WEIGHT: 148.8 LBS | BODY MASS INDEX: 20.15 KG/M2 | HEIGHT: 72 IN

## 2022-06-10 DIAGNOSIS — C01 CANCER OF BASE OF TONGUE (HCC): Primary | ICD-10-CM

## 2022-06-10 PROCEDURE — 1123F ACP DISCUSS/DSCN MKR DOCD: CPT | Performed by: OTOLARYNGOLOGY

## 2022-06-10 PROCEDURE — 31575 DIAGNOSTIC LARYNGOSCOPY: CPT | Performed by: OTOLARYNGOLOGY

## 2022-06-10 ASSESSMENT — ENCOUNTER SYMPTOMS
GASTROINTESTINAL NEGATIVE: 1
EYES NEGATIVE: 1
ALLERGIC/IMMUNOLOGIC NEGATIVE: 1
RESPIRATORY NEGATIVE: 1
SORE THROAT: 0

## 2022-06-10 NOTE — PROGRESS NOTES
Chief Complaint   Patient presents with    Follow-up     Cancer Surveillance        HPI:  Reji Dorsey is a 78 y.o. male seen in for a follow-up for Cancer Surveillance on his HPV-related R BOT SCCA- he completed chemoRT back on 10/4/21. Last seen back on 22. Doing well since then- no change in swallowing, sore throat or oral bleeding. He continues to c/o some throat dryness. No other new medical issues. His wife has been recovering from a bladder surgery. Past Medical History, Past Surgical History, Family history, Social History, and Medications were all reviewed with the patient today and updated as necessary. No Known Allergies  Patient Active Problem List   Diagnosis    Passing flatus    Hypokalemia    Rash    Low back pain    Squamous cell carcinoma of base of tongue (HCC)    Hypomagnesemia    Pure hypercholesterolemia    Pancytopenia due to antineoplastic chemotherapy ([address]    Acute deep vein thrombosis (DVT) of right upper extremity, unspecified vein (HCC)     Current Outpatient Medications   Medication Sig    levothyroxine (SYNTHROID) 100 MCG tablet Take 1 tablet by mouth every morning (before breakfast)    celecoxib (CELEBREX) 200 MG capsule Take 200 mg by mouth daily    LORazepam (ATIVAN) 0.5 MG tablet Take 1 mg by mouth daily as needed.  prochlorperazine (COMPAZINE) 10 MG tablet Take 10 mg by mouth every 6 hours as needed (Patient not taking: Reported on 6/10/2022)     No current facility-administered medications for this visit.      Past Medical History:   Diagnosis Date    Arthritis     Cancer of base of tongue ([facility] [address]     High cholesterol     Thyroid disease      Social History     Tobacco Use    Smoking status: Former Smoker     Quit date: 1970     Years since quittin.9    Smokeless tobacco: Never Used   Substance Use Topics    Alcohol use: No     Alcohol/week: 0.0 standard drinks     Past Surgical History:   Procedure Laterality Date    COLONOSCOPY 10/26/2015    COLONOSCOPY  10/26/2015    HEENT Right 07/14/2021    DL/E w/ bx of R BOT neoplasm- Blondie Flair     Family History   Problem Relation Age of Onset    No Known Problems Maternal Grandfather     No Known Problems Paternal Grandfather     Hypertension Sister     No Known Problems Maternal Grandmother     Heart Attack Father     Cancer Mother     Alcohol Abuse Brother         ROS:    Review of Systems   HENT: Negative. Negative for sore throat. Eyes: Negative. Respiratory: Negative. Cardiovascular: Negative. Gastrointestinal: Negative. Endocrine: Negative. Genitourinary: Negative. Musculoskeletal: Negative. Skin: Negative. Allergic/Immunologic: Negative. Neurological: Negative. Hematological: Negative. Psychiatric/Behavioral: Negative. PHYSICAL EXAM:    Ht 6' (1.829 m)   Wt 148 lb 12.8 oz (67.5 kg)   BMI 20.18 kg/m²     General: NAD, well-appearing, thin  Neuro: No gross neuro deficits. No facial weakness. Eyes: No periorbital edema/ecchymosis. No nystagmus. Skin: No facial erythema, rashes or concerning lesions. Nose: No external deviations or saddling. Intranasally, septum is dev off to R side without perforations, nasal mucosa appears healthy with no erythema, mucopurulence, or polyps. Mouth: Moist mucus membranes, normal tongue/palate mobility, no concerning mucosal lesions. Oropharynx clear with no erythema/exudate, no tonsillar hypertrophy. BOT is soft on palpation. Ears: Normal appearing auricles, no hematomas. EACs clear with no cerumen impaction, healthy canal skin, TM's intact with no perforations or retraction pockets. No middle ear effusions. Neck: Soft, supple, no palpable neck masses. No palpable parotid or submandibular masses. No thyromegaly or palpable thyroid nodules. Lymphatics: No palpable cervical LAD. Resp: No audible stridor or wheezing.   Extremities: No clubbing or cyanosis.     PROCEDURE: Flexible laryngoscopy  INDICATIONS: BOT cancer  DESCRIPTION: After verbal consent was obtained and a timeout was performed, the flexible scope was advanced down one of the patient's nares into the nasopharynx. The nasal cavity and nasopharynx were clear. The scope was then turned distally down towards the oropharynx. The BOT and vallecula were clear and the epiglottis was normal in appearance. There was some generalized edema of tongue base but no concerning masses. Both aryepiglottic folds were clear and there was a normal appearing glottis w/ healthy TVCs. No nodules or polyps and the cords were fully mobile. No concerning lesions seen along post-cricoid region or within either piriform sinus. The posterior pharyngeal was clear as well. The scope was then carefully removed. The patient tolerated the procedure well and there were no complications.          ASSESSMENT and PLAN      ICD-10-CM    1. Cancer of base of tongue ([facility] [address]  C01 LARYNGOSCOPY,FLEX FIBER,DIAGNOSTIC     Doing well now 8 mo out from completion of chemoRT for his HPV-related R BOT SCCA- SRIKANTH. RTC in 3 mo for another cancer surveillance visit.       Jose Al MD  6/10/2022    Electronically signed by Jose Al MD on 6/10/2022 at 10:59 AM

## 2022-07-01 ENCOUNTER — HOSPITAL ENCOUNTER (OUTPATIENT)
Dept: CT IMAGING | Age: 77
Discharge: HOME | End: 2022-07-04
Payer: MEDICARE

## 2022-07-01 DIAGNOSIS — Z85.89 HISTORY OF HEAD AND NECK CANCER: ICD-10-CM

## 2022-07-01 LAB — CREAT BLD-MCNC: 1.15 MG/DL (ref 0.8–1.5)

## 2022-07-01 PROCEDURE — 82565 ASSAY OF CREATININE: CPT

## 2022-07-01 PROCEDURE — 70491 CT SOFT TISSUE NECK W/DYE: CPT

## 2022-07-01 PROCEDURE — 6360000004 HC RX CONTRAST MEDICATION: Performed by: INTERNAL MEDICINE

## 2022-07-01 PROCEDURE — 2580000003 HC RX 258: Performed by: INTERNAL MEDICINE

## 2022-07-01 RX ORDER — 0.9 % SODIUM CHLORIDE 0.9 %
100 INTRAVENOUS SOLUTION INTRAVENOUS
Status: COMPLETED | OUTPATIENT
Start: 2022-07-01 | End: 2022-07-01

## 2022-07-01 RX ORDER — SODIUM CHLORIDE 0.9 % (FLUSH) 0.9 %
10 SYRINGE (ML) INJECTION
Status: COMPLETED | OUTPATIENT
Start: 2022-07-01 | End: 2022-07-01

## 2022-07-01 RX ADMIN — SODIUM CHLORIDE, PRESERVATIVE FREE 10 ML: 5 INJECTION INTRAVENOUS at 10:16

## 2022-07-01 RX ADMIN — SODIUM CHLORIDE 100 ML: 9 INJECTION, SOLUTION INTRAVENOUS at 10:16

## 2022-07-01 RX ADMIN — IOPAMIDOL 100 ML: 755 INJECTION, SOLUTION INTRAVENOUS at 10:16

## 2022-07-05 DIAGNOSIS — C01 SQUAMOUS CELL CARCINOMA OF BASE OF TONGUE (HCC): Primary | ICD-10-CM

## 2022-07-05 DIAGNOSIS — R68.89 OTHER GENERAL SYMPTOMS AND SIGNS: ICD-10-CM

## 2022-07-07 ENCOUNTER — RESEARCH ENCOUNTER (OUTPATIENT)
Dept: RESEARCH | Age: 77
End: 2022-07-07

## 2022-07-07 ENCOUNTER — HOSPITAL ENCOUNTER (OUTPATIENT)
Dept: LAB | Age: 77
Discharge: HOME | End: 2022-07-10
Payer: MEDICARE

## 2022-07-07 ENCOUNTER — OFFICE VISIT (OUTPATIENT)
Dept: ONCOLOGY | Age: 77
End: 2022-07-07
Payer: MEDICARE

## 2022-07-07 ENCOUNTER — OFFICE VISIT (OUTPATIENT)
Dept: ONCOLOGY | Age: 77
End: 2022-07-07

## 2022-07-07 VITALS
TEMPERATURE: 98 F | SYSTOLIC BLOOD PRESSURE: 101 MMHG | RESPIRATION RATE: 18 BRPM | OXYGEN SATURATION: 99 % | WEIGHT: 148.5 LBS | BODY MASS INDEX: 20.11 KG/M2 | HEART RATE: 64 BPM | DIASTOLIC BLOOD PRESSURE: 64 MMHG | HEIGHT: 72 IN

## 2022-07-07 DIAGNOSIS — Z00.8 NUTRITIONAL ASSESSMENT: Primary | ICD-10-CM

## 2022-07-07 DIAGNOSIS — C01 SQUAMOUS CELL CARCINOMA OF BASE OF TONGUE (HCC): ICD-10-CM

## 2022-07-07 DIAGNOSIS — R68.89 OTHER GENERAL SYMPTOMS AND SIGNS: ICD-10-CM

## 2022-07-07 DIAGNOSIS — Z85.810 HISTORY OF PRIMARY MALIGNANT NEOPLASM OF BASE OF TONGUE: Primary | ICD-10-CM

## 2022-07-07 LAB
ALBUMIN SERPL-MCNC: 3.6 G/DL (ref 3.2–4.6)
ALBUMIN/GLOB SERPL: 1.1 {RATIO} (ref 1.2–3.5)
ALP SERPL-CCNC: 52 U/L (ref 50–136)
ALT SERPL-CCNC: 16 U/L (ref 12–65)
ANION GAP SERPL CALC-SCNC: 8 MMOL/L (ref 7–16)
AST SERPL-CCNC: 16 U/L (ref 15–37)
BASOPHILS # BLD: 0 K/UL (ref 0–0.2)
BASOPHILS NFR BLD: 1 % (ref 0–2)
BILIRUB SERPL-MCNC: 0.6 MG/DL (ref 0.2–1.1)
BUN SERPL-MCNC: 21 MG/DL (ref 8–23)
CALCIUM SERPL-MCNC: 9.2 MG/DL (ref 8.3–10.4)
CHLORIDE SERPL-SCNC: 106 MMOL/L (ref 98–107)
CO2 SERPL-SCNC: 26 MMOL/L (ref 21–32)
CREAT SERPL-MCNC: 1.1 MG/DL (ref 0.8–1.5)
DIFFERENTIAL METHOD BLD: ABNORMAL
EOSINOPHIL # BLD: 0.1 K/UL (ref 0–0.8)
EOSINOPHIL NFR BLD: 5 % (ref 0.5–7.8)
ERYTHROCYTE [DISTWIDTH] IN BLOOD BY AUTOMATED COUNT: 12.4 % (ref 11.9–14.6)
GLOBULIN SER CALC-MCNC: 3.2 G/DL (ref 2.3–3.5)
GLUCOSE SERPL-MCNC: 90 MG/DL (ref 65–100)
HCT VFR BLD AUTO: 31.8 %
HGB BLD-MCNC: 10.9 G/DL (ref 13.6–17.2)
IMM GRANULOCYTES # BLD AUTO: 0 K/UL (ref 0–0.5)
IMM GRANULOCYTES NFR BLD AUTO: 0 % (ref 0–5)
LYMPHOCYTES # BLD: 0.4 K/UL (ref 0.5–4.6)
LYMPHOCYTES NFR BLD: 17 % (ref 13–44)
MAGNESIUM SERPL-MCNC: 1.9 MG/DL (ref 1.8–2.4)
MCH RBC QN AUTO: 31.7 PG (ref 26.1–32.9)
MCHC RBC AUTO-ENTMCNC: 34.3 G/DL (ref 31.4–35)
MCV RBC AUTO: 92.4 FL (ref 79.6–97.8)
MONOCYTES # BLD: 0.5 K/UL (ref 0.1–1.3)
MONOCYTES NFR BLD: 20 % (ref 4–12)
NEUTS SEG # BLD: 1.4 K/UL (ref 1.7–8.2)
NEUTS SEG NFR BLD: 56 % (ref 43–78)
NRBC # BLD: 0 K/UL (ref 0–0.2)
PLATELET # BLD AUTO: 117 K/UL (ref 150–450)
PMV BLD AUTO: 8.3 FL (ref 9.4–12.3)
POTASSIUM SERPL-SCNC: 3.8 MMOL/L (ref 3.5–5.1)
PROT SERPL-MCNC: 6.8 G/DL (ref 6.3–8.2)
RBC # BLD AUTO: 3.44 M/UL (ref 4.23–5.6)
SODIUM SERPL-SCNC: 140 MMOL/L (ref 136–145)
T4 FREE SERPL-MCNC: 1.2 NG/DL (ref 0.78–1.4)
TSH, 3RD GENERATION: 2.59 UIU/ML (ref 0.36–3)
WBC # BLD AUTO: 2.5 K/UL (ref 4.3–11.1)

## 2022-07-07 PROCEDURE — 83735 ASSAY OF MAGNESIUM: CPT

## 2022-07-07 PROCEDURE — 80053 COMPREHEN METABOLIC PANEL: CPT

## 2022-07-07 PROCEDURE — 36415 COLL VENOUS BLD VENIPUNCTURE: CPT

## 2022-07-07 PROCEDURE — 1123F ACP DISCUSS/DSCN MKR DOCD: CPT | Performed by: INTERNAL MEDICINE

## 2022-07-07 PROCEDURE — 85025 COMPLETE CBC W/AUTO DIFF WBC: CPT

## 2022-07-07 PROCEDURE — 84439 ASSAY OF FREE THYROXINE: CPT

## 2022-07-07 PROCEDURE — 84443 ASSAY THYROID STIM HORMONE: CPT

## 2022-07-07 PROCEDURE — 99213 OFFICE O/P EST LOW 20 MIN: CPT | Performed by: INTERNAL MEDICINE

## 2022-07-07 ASSESSMENT — PATIENT HEALTH QUESTIONNAIRE - PHQ9
SUM OF ALL RESPONSES TO PHQ QUESTIONS 1-9: 0
SUM OF ALL RESPONSES TO PHQ QUESTIONS 1-9: 0
SUM OF ALL RESPONSES TO PHQ9 QUESTIONS 1 & 2: 0
SUM OF ALL RESPONSES TO PHQ QUESTIONS 1-9: 0
7. TROUBLE CONCENTRATING ON THINGS, SUCH AS READING THE NEWSPAPER OR WATCHING TELEVISION: 0
5. POOR APPETITE OR OVEREATING: 0
8. MOVING OR SPEAKING SO SLOWLY THAT OTHER PEOPLE COULD HAVE NOTICED. OR THE OPPOSITE, BEING SO FIGETY OR RESTLESS THAT YOU HAVE BEEN MOVING AROUND A LOT MORE THAN USUAL: 0
6. FEELING BAD ABOUT YOURSELF - OR THAT YOU ARE A FAILURE OR HAVE LET YOURSELF OR YOUR FAMILY DOWN: 0
SUM OF ALL RESPONSES TO PHQ QUESTIONS 1-9: 0
1. LITTLE INTEREST OR PLEASURE IN DOING THINGS: 0
3. TROUBLE FALLING OR STAYING ASLEEP: 0
2. FEELING DOWN, DEPRESSED OR HOPELESS: 0
10. IF YOU CHECKED OFF ANY PROBLEMS, HOW DIFFICULT HAVE THESE PROBLEMS MADE IT FOR YOU TO DO YOUR WORK, TAKE CARE OF THINGS AT HOME, OR GET ALONG WITH OTHER PEOPLE: 0
9. THOUGHTS THAT YOU WOULD BE BETTER OFF DEAD, OR OF HURTING YOURSELF: 0
4. FEELING TIRED OR HAVING LITTLE ENERGY: 0

## 2022-07-07 NOTE — PROGRESS NOTES
HEMATOLOGY/ONCOLOGY FOLLOWUP  VISIT      Patient Name: Eun Santo             Date of Visit: 2022  : 1945  Age:77 y.o. Presenting Complaint:  Eun Santo  is seen in follow-up for a base of tongue carcinoma. History of Present Illness:  Mr. Gabo Brady was first seen in our office in 2021. He was a gentleman who was referred for management of a head neck cancer. Beginning in approximately 2021 following his initial Covid  vaccination, he noted some swelling in his right neck. This was treated empirically with antibiotics and resulted in prompt resolution. He subsequently had a recurrence of the swelling about a month or 2 later and once again was treated with antibiotics  with improvement. In  however he had yet another bout of swelling in the right neck and was referred to ENT for evaluation. CT scanning performed on  showed a 3.0 x 3.2 cm enhancing mass within the oropharynx on the right at the level of the  base of tongue. There was also an enlarged jugulodigastric node on the right measuring 2.8 x 1.5 cm. Dr. Lia Hughes saw him for the first time on . Coincidentally, the patient experienced a bout of hemoptysis that morning which she described as  bright red but limited in quantity. Dr. Lia Hughes confirmed the presence of a base of tongue lesion on the right side which crossed midline but did not involve the lateral pharyngeal wall or epiglottis. He was taken to the operating room 2 days later  and underwent a direct laryngoscopy with biopsies. 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 PET scanning was performed on  and notable for a hypermetabolic  2.5 cm mass at the right base of tongue with an enlarged 19 mm hypermetabolic right level 2 lymph node and a smaller 11 mm hypermetabolic left level 2 lymph node.   There were no findings in the chest.  There was a focal area of FDG avidity in the lower  rectal wall with a question of some soft tissue thickening on the noncontrast CT. Correlation with colonoscopy was recommended. His last colonoscopy was in 2015. The patient has noted no swallowing difficulties he has had one further bout of mild hemoptysis. He returns today for follow-up. In June he underwent an ENT evaluation without findings of significance. He also underwent a CT scan in early July which showed continued improvement of the tissues around the base of tongue and no evidence of tumor recurrence. The bulk of our visit today was discussing his difficulties with xerostomia and dysgeusia. Nevertheless, he states that the symptoms are gradually improving on their own. His wife was incapacitated for a while due to some pelvic floor surgery that she had done. He managed to care for himself and her without much difficulty during that period of time. His appetite is improved despite his lack of sense of taste. He has not however regained some of the lost weight. Medications:   Current Outpatient Medications   Medication Sig Dispense Refill    levothyroxine (SYNTHROID) 100 MCG tablet Take 1 tablet by mouth every morning (before breakfast) 90 tablet 1    celecoxib (CELEBREX) 200 MG capsule Take 200 mg by mouth daily      LORazepam (ATIVAN) 0.5 MG tablet Take 1 mg by mouth daily as needed. (Patient not taking: Reported on 7/7/2022)      prochlorperazine (COMPAZINE) 10 MG tablet Take 10 mg by mouth every 6 hours as needed (Patient not taking: Reported on 6/10/2022)       No current facility-administered medications for this visit. Allergies:  No Known Allergies    Review of Systems: The Review of Systems is documented in full in the internal medical record. All systems are negative other than for those noted above.      Past Medical History:  Documented in electronic medical record    Past Surgical History:  Documented in electronic medical record    Social History:  Documented in electronic medical record    Family History:  Documented in electronic medical record      Physical Examination:  General Appearance: Healthy appearing patient in no acute distress. Vital signs: /64 Comment: standing  Pulse 64   Temp 98 °F (36.7 °C)   Resp 18   Ht 6' (1.829 m)   Wt 148 lb 8 oz (67.4 kg)   SpO2 99%   BMI 20.14 kg/m²     Performance status: ECOG Level: 0  Distress  Screening Score: PHQ-9 Total Score: 0 (7/7/2022  8:31 AM)  Thoughts that you would be better off dead, or of hurting yourself in some way: 0 (7/7/2022  8:31 AM)  Pain Score:   0 - No pain (fatigue-0 )    HEENT: Scarring along the left tonsillar pillar otherwise no lesions. White somewhat tenacious mucus noted. Neck: Supple. There is no thyromegaly. Lymph nodes: There is no cervical, supraclavicular, axillary or inguinal adenopathy. Lungs: The lungs are clear to auscultation and percussion. There is no egophony. There is no chest wall tenderness and no  use of accessory respiratory musculature. Heart: There is no jugular venous distention. The rate is normal and rhythm regular. The S1 and S2 are normal and there are no murmurs or rubs. Abdomen: Soft, non-tender, bowel sounds present and normal, no appreciated hepatosplenomegaly. No palpable masses. Skin: No rash, petechiae or ecchymoses. No evidence of malignancy. Extremities: No cyanosis, clubbing or edema.      Labs/Imaging:  Lab Results   Component Value Date/Time    WBC 2.5 07/07/2022 08:17 AM    HGB 10.9 07/07/2022 08:17 AM    HCT 31.8 07/07/2022 08:17 AM     07/07/2022 08:17 AM    MCV 92.4 07/07/2022 08:17 AM       Lab Results   Component Value Date/Time     07/07/2022 08:17 AM    K 3.8 07/07/2022 08:17 AM     07/07/2022 08:17 AM    CO2 26 07/07/2022 08:17 AM    BUN 21 07/07/2022 08:17 AM    GFRAA >60 07/07/2022 08:17 AM    GLOB 3.2 07/07/2022 08:17 AM    ALT 16 07/07/2022 08:17 AM       Above results reviewed with patient. 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 He has received radiation therapy concurrent with weekly doses of cisplatin given at a dose of 40  mg/m². He continues to do well with some xerostomia and dysgeusia but this is peers to be improving spontaneously. Right arm DVT-doing well off anticoagulation. Mild pancytopenia-this has been fluctuating since his treatment. PLAN:  I will see him again in 3 months time. Repeat CBC at that time. If his pancytopenia is persistent or worsening, at some point we may need to consider a bone marrow aspirate and biopsy following alkylating agent therapy. He has been given information artificial saliva and other rinses to enhance his sense of taste and xerostomia. He can return to Ensure or boost supplements as needed to enhance weight gain but I have deferred that to him. Continue hydration and mouth wetting as needed for symptom management.         RESUSCITATION DIRECTIVES/HOSPICE CARE;  Full Support           [facility]    Oncology and Hematology   [address]  P [phone]  F [phone]  [email or web address]

## 2022-07-07 NOTE — RESEARCH
Subject seen in clinic for Designated Clinician visit on [facility] 1805CD. Subject has completed baseline surveys prior to coming today and research coordinator confirmed in [address]. Subject still meets eligibility and is SRIKANTH with ENT evaluation in June 2022 and CT July 1 2022. Subject has mild complaints and is doing relatively well. Subject voluntarily signed Part 2 consent for study. A copy was emailed to subject per his request. Subject consented to optional phone calls. Subject registered to Step 2. DC completed his visit with subject and a copy of the care plan was provided to subject. Subject has another appointment at  that he is already running late for and wished to have post visit survey emailed to him. Email sent to him and DC via [employer]. Subject wishes to remain on trial. Next visit is in 3 mths and will be remote. Instructed subject to call with any questions or concerns. Subject was provided with a business card for research coordinator. Subject verbalized understanding to all.

## 2022-08-15 ENCOUNTER — OFFICE VISIT (OUTPATIENT)
Dept: ENT CLINIC | Age: 77
End: 2022-08-15
Payer: MEDICARE

## 2022-08-15 VITALS — WEIGHT: 148 LBS | BODY MASS INDEX: 20.05 KG/M2 | HEIGHT: 72 IN

## 2022-08-15 DIAGNOSIS — C01 SQUAMOUS CELL CARCINOMA OF BASE OF TONGUE (HCC): Primary | ICD-10-CM

## 2022-08-15 PROCEDURE — 1123F ACP DISCUSS/DSCN MKR DOCD: CPT | Performed by: OTOLARYNGOLOGY

## 2022-08-15 PROCEDURE — 99213 OFFICE O/P EST LOW 20 MIN: CPT | Performed by: OTOLARYNGOLOGY

## 2022-08-15 ASSESSMENT — ENCOUNTER SYMPTOMS
ABDOMINAL PAIN: 0
SHORTNESS OF BREATH: 0

## 2022-08-15 NOTE — PROGRESS NOTES
Chief Complaint   Patient presents with    Follow-up     Patient states that his dentist found a spot on his uvula. HPI:  Sharon Kraus is a 78 y.o. male seen in follow-up for his HPV-related R BOT SCCA- he completed chemoRT back on 10/4/21. Last seen back on 6/10/22. He comes in today after his dentist noted a white spot near uvula along soft palate on recent exam. He denies any oral pain, sore throat, change in swallowing or other new throat sxs. Past Medical History, Past Surgical History, Family history, Social History, and Medications were all reviewed with the patient today and updated as necessary. No Known Allergies  Patient Active Problem List   Diagnosis    Passing flatus    Hypokalemia    Rash    Low back pain    Squamous cell carcinoma of base of tongue (HCC)    Hypomagnesemia    Pure hypercholesterolemia    Pancytopenia due to antineoplastic chemotherapy ([facility] [address]    Acute deep vein thrombosis (DVT) of right upper extremity, unspecified vein (HCC)     Current Outpatient Medications   Medication Sig    levothyroxine (SYNTHROID) 100 MCG tablet Take 1 tablet by mouth every morning (before breakfast)    celecoxib (CELEBREX) 200 MG capsule Take 200 mg by mouth daily    LORazepam (ATIVAN) 0.5 MG tablet Take 1 mg by mouth daily as needed. prochlorperazine (COMPAZINE) 10 MG tablet Take 10 mg by mouth every 6 hours as needed     No current facility-administered medications for this visit.      Past Medical History:   Diagnosis Date    Arthritis     Cancer of base of tongue (HCC)     High cholesterol     Thyroid disease      Social History     Tobacco Use    Smoking status: Former     Types: Cigarettes     Quit date: 1970     Years since quittin.1    Smokeless tobacco: Never   Substance Use Topics    Alcohol use: No     Alcohol/week: 0.0 standard drinks     Past Surgical History:   Procedure Laterality Date    COLONOSCOPY  10/26/2015    COLONOSCOPY  10/26/2015    HEENT Right 2021 DL/E w/ bx of R BOT neoplasm- Susan Dobson     Family History   Problem Relation Age of Onset    No Known Problems Maternal Grandfather     No Known Problems Paternal Grandfather     Hypertension Sister     No Known Problems Maternal Grandmother     Heart Attack Father     Cancer Mother     Alcohol Abuse Brother         ROS:    Review of Systems   Constitutional:  Negative for fever. HENT:  Positive for mouth sores. Eyes:  Negative for visual disturbance. Respiratory:  Negative for shortness of breath. Cardiovascular:  Negative for chest pain. Gastrointestinal:  Negative for abdominal pain. Skin:  Negative for rash. Neurological:  Negative for dizziness and headaches. Hematological:  Negative for adenopathy. Psychiatric/Behavioral:  Negative for agitation. PHYSICAL EXAM:    Ht 6' (1.829 m)   Wt 148 lb (67.1 kg)   BMI 20.07 kg/m²     General: NAD, well-appearing  Neuro: No gross neuro deficits. No facial weakness. Eyes: No periorbital edema/ecchymosis. No nystagmus. Skin: No facial erythema, rashes or concerning lesions. Nose: No external deviations or saddling. Intranasally, septum is midline without perforations, nasal mucosa appears healthy with no erythema, mucopurulence, or polyps. Mouth: Moist mucus membranes, normal tongue/palate mobility, mild post-RT changes of palate, no concerning mucosal lesions. Oropharynx clear with no erythema/exudate, no tonsillar hypertrophy. BOT soft on palpation. Ears: Normal appearing auricles, no hematomas. EACs clear with no cerumen impaction, healthy canal skin, TM's intact with no perforations or retraction pockets. No middle ear effusions. Neck: Soft, supple, no palpable neck masses. No palpable parotid or submandibular masses. No thyromegaly or palpable thyroid nodules. Lymphatics: No palpable cervical LAD. Resp: No audible stridor or wheezing. CV: No murmurs, no JVD. Extremities: No clubbing or cyanosis.       ASSESSMENT and PLAN

## 2022-09-20 RX ORDER — LEVOTHYROXINE SODIUM 0.1 MG/1
TABLET ORAL
Qty: 90 TABLET | Refills: 1 | OUTPATIENT
Start: 2022-09-20

## 2022-10-03 RX ORDER — CELECOXIB 200 MG/1
200 CAPSULE ORAL DAILY
Qty: 30 CAPSULE | Refills: 0 | Status: SHIPPED | OUTPATIENT
Start: 2022-10-03 | End: 2022-11-04 | Stop reason: SDUPTHER

## 2022-10-04 DIAGNOSIS — R68.89 OTHER GENERAL SYMPTOMS AND SIGNS: ICD-10-CM

## 2022-10-04 DIAGNOSIS — Z85.810 HISTORY OF PRIMARY MALIGNANT NEOPLASM OF BASE OF TONGUE: Primary | ICD-10-CM

## 2022-10-06 ENCOUNTER — HOSPITAL ENCOUNTER (OUTPATIENT)
Dept: LAB | Age: 77
Discharge: HOME | End: 2022-10-09
Payer: MEDICARE

## 2022-10-06 ENCOUNTER — RESEARCH ENCOUNTER (OUTPATIENT)
Dept: RESEARCH | Age: 77
End: 2022-10-06

## 2022-10-06 ENCOUNTER — OFFICE VISIT (OUTPATIENT)
Dept: ONCOLOGY | Age: 77
End: 2022-10-06
Payer: MEDICARE

## 2022-10-06 ENCOUNTER — OFFICE VISIT (OUTPATIENT)
Dept: ONCOLOGY | Age: 77
End: 2022-10-06

## 2022-10-06 VITALS
DIASTOLIC BLOOD PRESSURE: 82 MMHG | TEMPERATURE: 97.8 F | RESPIRATION RATE: 12 BRPM | SYSTOLIC BLOOD PRESSURE: 122 MMHG | WEIGHT: 149.6 LBS | HEART RATE: 85 BPM | OXYGEN SATURATION: 99 % | HEIGHT: 72 IN | BODY MASS INDEX: 20.26 KG/M2

## 2022-10-06 DIAGNOSIS — Z00.8 NUTRITIONAL ASSESSMENT: Primary | ICD-10-CM

## 2022-10-06 DIAGNOSIS — R68.89 OTHER GENERAL SYMPTOMS AND SIGNS: ICD-10-CM

## 2022-10-06 DIAGNOSIS — C01 MALIGNANT NEOPLASM OF BASE OF TONGUE (HCC): Primary | ICD-10-CM

## 2022-10-06 DIAGNOSIS — Z85.810 HISTORY OF PRIMARY MALIGNANT NEOPLASM OF BASE OF TONGUE: ICD-10-CM

## 2022-10-06 LAB
ALBUMIN SERPL-MCNC: 3.9 G/DL (ref 3.2–4.6)
ALBUMIN/GLOB SERPL: 1.2 {RATIO} (ref 1.2–3.5)
ALP SERPL-CCNC: 64 U/L (ref 50–136)
ALT SERPL-CCNC: 19 U/L (ref 12–65)
ANION GAP SERPL CALC-SCNC: 4 MMOL/L (ref 4–13)
AST SERPL-CCNC: 17 U/L (ref 15–37)
BASOPHILS # BLD: 0 K/UL (ref 0–0.2)
BASOPHILS NFR BLD: 1 % (ref 0–2)
BILIRUB SERPL-MCNC: 0.7 MG/DL (ref 0.2–1.1)
BUN SERPL-MCNC: 17 MG/DL (ref 8–23)
CALCIUM SERPL-MCNC: 9.5 MG/DL (ref 8.3–10.4)
CHLORIDE SERPL-SCNC: 106 MMOL/L (ref 101–110)
CO2 SERPL-SCNC: 29 MMOL/L (ref 21–32)
CREAT SERPL-MCNC: 1.1 MG/DL (ref 0.8–1.5)
DIFFERENTIAL METHOD BLD: ABNORMAL
EOSINOPHIL # BLD: 0.1 K/UL (ref 0–0.8)
EOSINOPHIL NFR BLD: 3 % (ref 0.5–7.8)
ERYTHROCYTE [DISTWIDTH] IN BLOOD BY AUTOMATED COUNT: 12.5 % (ref 11.9–14.6)
GLOBULIN SER CALC-MCNC: 3.3 G/DL (ref 2.3–3.5)
GLUCOSE SERPL-MCNC: 84 MG/DL (ref 65–100)
HCT VFR BLD AUTO: 35.1 %
HGB BLD-MCNC: 11.9 G/DL (ref 13.6–17.2)
IMM GRANULOCYTES # BLD AUTO: 0 K/UL (ref 0–0.5)
IMM GRANULOCYTES NFR BLD AUTO: 0 % (ref 0–5)
LYMPHOCYTES # BLD: 0.4 K/UL (ref 0.5–4.6)
LYMPHOCYTES NFR BLD: 13 % (ref 13–44)
MAGNESIUM SERPL-MCNC: 2.2 MG/DL (ref 1.8–2.4)
MCH RBC QN AUTO: 31.4 PG (ref 26.1–32.9)
MCHC RBC AUTO-ENTMCNC: 33.9 G/DL (ref 31.4–35)
MCV RBC AUTO: 92.6 FL (ref 79.6–97.8)
MONOCYTES # BLD: 0.5 K/UL (ref 0.1–1.3)
MONOCYTES NFR BLD: 18 % (ref 4–12)
NEUTS SEG # BLD: 1.9 K/UL (ref 1.7–8.2)
NEUTS SEG NFR BLD: 64 % (ref 43–78)
NRBC # BLD: 0 K/UL (ref 0–0.2)
PLATELET # BLD AUTO: 150 K/UL (ref 150–450)
PMV BLD AUTO: 8.2 FL (ref 9.4–12.3)
POTASSIUM SERPL-SCNC: 3.9 MMOL/L (ref 3.5–5.1)
PROT SERPL-MCNC: 7.2 G/DL (ref 6.3–8.2)
RBC # BLD AUTO: 3.79 M/UL (ref 4.23–5.6)
SODIUM SERPL-SCNC: 139 MMOL/L (ref 136–145)
T4 FREE SERPL-MCNC: 1.1 NG/DL (ref 0.78–1.4)
TSH, 3RD GENERATION: 4.05 UIU/ML (ref 0.36–3)
WBC # BLD AUTO: 2.9 K/UL (ref 4.3–11.1)

## 2022-10-06 PROCEDURE — 99213 OFFICE O/P EST LOW 20 MIN: CPT | Performed by: INTERNAL MEDICINE

## 2022-10-06 PROCEDURE — 80053 COMPREHEN METABOLIC PANEL: CPT

## 2022-10-06 PROCEDURE — 85025 COMPLETE CBC W/AUTO DIFF WBC: CPT

## 2022-10-06 PROCEDURE — 1123F ACP DISCUSS/DSCN MKR DOCD: CPT | Performed by: INTERNAL MEDICINE

## 2022-10-06 PROCEDURE — 83735 ASSAY OF MAGNESIUM: CPT

## 2022-10-06 PROCEDURE — 36415 COLL VENOUS BLD VENIPUNCTURE: CPT

## 2022-10-06 PROCEDURE — 84439 ASSAY OF FREE THYROXINE: CPT

## 2022-10-06 PROCEDURE — 84443 ASSAY THYROID STIM HORMONE: CPT

## 2022-10-06 ASSESSMENT — PATIENT HEALTH QUESTIONNAIRE - PHQ9
2. FEELING DOWN, DEPRESSED OR HOPELESS: 0
SUM OF ALL RESPONSES TO PHQ QUESTIONS 1-9: 0

## 2022-10-06 NOTE — PATIENT INSTRUCTIONS
Patient Instructions from Today's Visit    Reason for Visit:  Follow up for H&N Cancer     Plan:  Dr. Yoli Campos is pleased with your blood counts today and how they are recovering     Continue to follow up with Dr. Julienne Casas     We will repeat a CT scan in January - this will be 6 months from your last scan, and then another scan 6 months from then and hopefully discharge you from Medical Oncology in July 2023. Hopefully your taste buds will continue to improve - if you find flavors that taste good or normal to you, add them to other foods to make them more palatable.      Follow Up:  3 months with Dr. Elzbieta Campuzano Results:  Hospital Outpatient Visit on 10/06/2022   Component Date Value Ref Range Status    WBC 10/06/2022 2.9 (A)  4.3 - 11.1 K/uL Final    RBC 10/06/2022 3.79 (A)  4.23 - 5.6 M/uL Final    Hemoglobin 10/06/2022 11.9 (A)  13.6 - 17.2 g/dL Final    Hematocrit 10/06/2022 35.1  % Final    MCV 10/06/2022 92.6  79.6 - 97.8 FL Final    MCH 10/06/2022 31.4  26.1 - 32.9 PG Final    MCHC 10/06/2022 33.9  31.4 - 35.0 g/dL Final    RDW 10/06/2022 12.5  11.9 - 14.6 % Final    Platelets 46/67/7669 150  150 - 450 K/uL Final    MPV 10/06/2022 8.2 (A)  9.4 - 12.3 FL Final    nRBC 10/06/2022 0.00  0.0 - 0.2 K/uL Final    **Note: Absolute NRBC parameter is now reported with Hemogram**    Differential Type 10/06/2022 AUTOMATED    Final    Seg Neutrophils 10/06/2022 64  43 - 78 % Final    Lymphocytes 10/06/2022 13  13 - 44 % Final    Monocytes 10/06/2022 18 (A)  4.0 - 12.0 % Final    Eosinophils % 10/06/2022 3  0.5 - 7.8 % Final    Basophils 10/06/2022 1  0.0 - 2.0 % Final    Immature Granulocytes 10/06/2022 0  0.0 - 5.0 % Final    Segs Absolute 10/06/2022 1.9  1.7 - 8.2 K/UL Final    Absolute Lymph # 10/06/2022 0.4 (A)  0.5 - 4.6 K/UL Final    Absolute Mono # 10/06/2022 0.5  0.1 - 1.3 K/UL Final    Absolute Eos # 10/06/2022 0.1  0.0 - 0.8 K/UL Final    Basophils Absolute 10/06/2022 0.0  0.0 - 0.2 K/UL Final Absolute Immature Granulocyte 10/06/2022 0.0  0.0 - 0.5 K/UL Final    Sodium 10/06/2022 139  136 - 145 mmol/L Final    Potassium 10/06/2022 3.9  3.5 - 5.1 mmol/L Final    Chloride 10/06/2022 106  101 - 110 mmol/L Final    CO2 10/06/2022 29  21 - 32 mmol/L Final    Anion Gap 10/06/2022 4  4 - 13 mmol/L Final    Glucose 10/06/2022 84  65 - 100 mg/dL Final    BUN 10/06/2022 17  8 - 23 MG/DL Final    Creatinine 10/06/2022 1.10  0.8 - 1.5 MG/DL Final    Est, Glom Filt Rate 10/06/2022 >60  >60 ml/min/1.73m2 Final    Comment:      Pediatric calculator link: [employer]zy.at. org/professionals/kdoqi/gfr_calculatorped       Effective Oct 3, 2022       These results are not intended for use in patients <25years of age. eGFR results are calculated without a race factor using  the 2021 CKD-EPI equation. Careful clinical correlation is recommended, particularly when comparing to results calculated using previous equations. The CKD-EPI equation is less accurate in patients with extremes of muscle mass, extra-renal metabolism of creatinine, excessive creatine ingestion, or following therapy that affects renal tubular secretion.       Calcium 10/06/2022 9.5  8.3 - 10.4 MG/DL Final    Total Bilirubin 10/06/2022 0.7  0.2 - 1.1 MG/DL Final    ALT 10/06/2022 19  12 - 65 U/L Final    AST 10/06/2022 17  15 - 37 U/L Final    Alk Phosphatase 10/06/2022 64  50 - 136 U/L Final    Total Protein 10/06/2022 7.2  6.3 - 8.2 g/dL Final    Albumin 10/06/2022 3.9  3.2 - 4.6 g/dL Final    Globulin 10/06/2022 3.3  2.3 - 3.5 g/dL Final    Albumin/Globulin Ratio 10/06/2022 1.2  1.2 - 3.5   Final    Magnesium 10/06/2022 2.2  1.8 - 2.4 mg/dL Final    TSH, 3RD GENERATION 10/06/2022 4.050 (A)  0.358 - 3 uIU/mL Final    T4 Free 10/06/2022 1.1  0.78 - 1.4 NG/DL Final         Treatment Summary has been discussed and given to patient: NA        -------------------------------------------------------------------------------------------------------------------  Please call our office at [phone] if you have any  of the following symptoms:   Fever of 100.5 or greater  Chills  Shortness of breath  Swelling or pain in one leg    After office hours an answering service is available and will contact a provider for emergencies or if you are experiencing any of the above symptoms. Patient has My Chart. My Chart log in information explained on the after visit summary printout at the [facility] [address] desk.     [address] [place] [address], [address], [place]  Outpatient Oncology Dietitian  Head and Neck Tumor Navigator  [phone]  [email or web address]

## 2022-10-06 NOTE — PROGRESS NOTES
HEMATOLOGY/ONCOLOGY FOLLOWUP  VISIT      Patient Name: Shea Ramírez             Date of Visit: 10/6/2022  : 1945  Age:77 y.o. Presenting Complaint:  Shea Ramírez  is seen in follow-up for a base of tongue carcinoma. History of Present Illness:  Mr. Ryan Connolly was first seen in our office in 2021. He was a gentleman who was referred for management of a head neck cancer. Beginning in approximately 2021 following his initial Covid  vaccination, he noted some swelling in his right neck. This was treated empirically with antibiotics and resulted in prompt resolution. He subsequently had a recurrence of the swelling about a month or 2 later and once again was treated with antibiotics  with improvement. In  however he had yet another bout of swelling in the right neck and was referred to ENT for evaluation. CT scanning performed on  showed a 3.0 x 3.2 cm enhancing mass within the oropharynx on the right at the level of the  base of tongue. There was also an enlarged jugulodigastric node on the right measuring 2.8 x 1.5 cm. Dr. Earle Higgins saw him for the first time on . Coincidentally, the patient experienced a bout of hemoptysis that morning which she described as  bright red but limited in quantity. Dr. Earle Higgins confirmed the presence of a base of tongue lesion on the right side which crossed midline but did not involve the lateral pharyngeal wall or epiglottis. He was taken to the operating room 2 days later  and underwent a direct laryngoscopy with biopsies. 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 PET scanning was performed on  and notable for a hypermetabolic  2.5 cm mass at the right base of tongue with an enlarged 19 mm hypermetabolic right level 2 lymph node and a smaller 11 mm hypermetabolic left level 2 lymph node.   There were no findings in the chest.  There was a focal area of FDG avidity in the lower  rectal wall with a question of some soft tissue thickening on the noncontrast CT. Correlation with colonoscopy was recommended. His last colonoscopy was in 2015. The patient has noted no swallowing difficulties he has had one further bout of mild hemoptysis. He returns today for follow-up. He continues to do extremely well. His only complaint is that his sense of taste is not as acute as he would like it to be but nevertheless much improved over what it was during treatment. He has some mild xerostomia but this does not appear to be bothersome. His activity level is back to normal.  His appetite is excellent and he has no difficulty with swallowing. He denies pain of any kind. His most recent ENT evaluation has demonstrated no evidence of recurrent disease and he is due for another evaluation in a few weeks. Medications:   Current Outpatient Medications   Medication Sig Dispense Refill    celecoxib (CELEBREX) 200 MG capsule Take 1 capsule by mouth daily (Patient taking differently: Take 200 mg by mouth daily PRN) 30 capsule 0    levothyroxine (SYNTHROID) 100 MCG tablet Take 1 tablet by mouth every morning (before breakfast) 90 tablet 1    LORazepam (ATIVAN) 0.5 MG tablet Take 1 mg by mouth daily as needed. (Patient not taking: Reported on 10/6/2022)      prochlorperazine (COMPAZINE) 10 MG tablet Take 10 mg by mouth every 6 hours as needed (Patient not taking: Reported on 10/6/2022)       No current facility-administered medications for this visit. Allergies:  No Known Allergies    Review of Systems: The Review of Systems is documented in full in the internal medical record. All systems are negative other than for those noted above.      Past Medical History:  Documented in electronic medical record    Past Surgical History:  Documented in electronic medical record    Social History:  Documented in electronic medical record    Family History:  Documented in electronic medical record      Physical Examination:  General Appearance: Healthy appearing patient in no acute distress. Vital signs: /82 (Site: Left Upper Arm, Position: Standing)   Pulse 85   Temp 97.8 °F (36.6 °C)   Resp 12   Ht 6' (1.829 m)   Wt 149 lb 9.6 oz (67.9 kg)   SpO2 99%   BMI 20.29 kg/m²     Performance status: ECOG Level: 0  Distress  Screening Score: PHQ-9 Total Score: 0 (10/6/2022 10:14 AM)  Pain Score:   0 - No pain (Fatigue-0)    HEENT: Scarring along the left tonsillar pillar otherwise no lesions. Neck: Supple. There is no thyromegaly. Lymph nodes: There is no cervical, supraclavicular, axillary or inguinal adenopathy. Lungs: The lungs are clear to auscultation and percussion. There is no egophony. There is no chest wall tenderness and no  use of accessory respiratory musculature. Heart: There is no jugular venous distention. The rate is normal and rhythm regular. The S1 and S2 are normal and there are no murmurs or rubs. Abdomen: Soft, non-tender, bowel sounds present and normal, no appreciated hepatosplenomegaly. No palpable masses. Skin: No rash, petechiae or ecchymoses. No evidence of malignancy. Extremities: No cyanosis, clubbing or edema. Labs/Imaging:  Lab Results   Component Value Date/Time    WBC 2.9 10/06/2022 09:56 AM    HGB 11.9 10/06/2022 09:56 AM    HCT 35.1 10/06/2022 09:56 AM     10/06/2022 09:56 AM    MCV 92.6 10/06/2022 09:56 AM       Lab Results   Component Value Date/Time     10/06/2022 09:56 AM    K 3.9 10/06/2022 09:56 AM     10/06/2022 09:56 AM    CO2 29 10/06/2022 09:56 AM    BUN 17 10/06/2022 09:56 AM    GFRAA >60 07/07/2022 08:17 AM    GLOB 3.3 10/06/2022 09:56 AM    ALT 19 10/06/2022 09:56 AM       Above results reviewed with patient. 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  He  received radiation therapy concurrent with weekly doses of cisplatin given at a dose of 40  mg/m². He is doing extremely well with very few residual toxicities from therapy. Right arm DVT-doing well off anticoagulation. Mild pancytopenia-this is spontaneously improved today. PLAN:  We will make arrangements for him to return in 3 months time with a repeat CT scan of his neck. He will be following up with . My suggested follow-up after that would be to repeat another CT scan in approximately 6 months which would cassie 2 years from diagnosis. I frequently discharge individuals from the clinic at that point for routine follow-up with ENT.         RESUSCITATION DIRECTIVES/HOSPICE CARE;  Full Support           [facility]    Oncology and Hematology   [address]  P [phone]  F [phone]  [email or web address]

## 2022-10-06 NOTE — RESEARCH
Subject seen in clinic for 3 mth quarterly visit on  1805CD study. ECOG=0. Subject completed surveys remotely yesterday on 10/5/22. I came to his visit to provide printed copies of his gift card emails for DC post visit and 3 mth visit as he does not have a printer at home and requested me to do so. I spoke to the subject about in the future I can get physical gift cards from the study and mail those to him instead of him getting eCards. Subject would prefer this method. I confirmed address with him. I instructed to subject that he will have another set of surveys via email due in 3 months 1/3/23 or after (30 day window). Advised subject to call if any questions or issues. Subject verbalized understanding and agrees to continue study participation.

## 2022-10-07 NOTE — PROGRESS NOTES
Nutrition F/U:  Assessment:  Pt seen during office visit w/ Dr. Ho Parra, completed treatment ~ 1 year ago, reports an excellent appetite/po intake, remains active and walking daily. Pt reports improvement w/ dry mouth, tastes remain altered but denies pain or sensitivity to flavors, continues to follow up with ENT. Current BW: 149#, stable. Intervention:  1. Continue w/ Regular diet  2. Continue to follow up with ENT  3. Pt to follow up in 3 months w/ CT scan, will continue Med Onc follow up w/ Dr. Topher Horowitz. Monitoring/Evaluation:  1. RD to follow up during next office visit - follow up wt status, tolerance/intake of po diet, symptom management.       [address] [facility], [address], [address] [facility]

## 2022-10-28 ENCOUNTER — OFFICE VISIT (OUTPATIENT)
Dept: ENT CLINIC | Age: 77
End: 2022-10-28
Payer: MEDICARE

## 2022-10-28 VITALS — WEIGHT: 152.2 LBS | BODY MASS INDEX: 20.62 KG/M2 | HEIGHT: 72 IN

## 2022-10-28 DIAGNOSIS — C01 CARCINOMA OF BASE OF TONGUE (HCC): Primary | ICD-10-CM

## 2022-10-28 PROCEDURE — 31575 DIAGNOSTIC LARYNGOSCOPY: CPT | Performed by: OTOLARYNGOLOGY

## 2022-10-28 PROCEDURE — 1123F ACP DISCUSS/DSCN MKR DOCD: CPT | Performed by: OTOLARYNGOLOGY

## 2022-10-28 ASSESSMENT — ENCOUNTER SYMPTOMS
ABDOMINAL PAIN: 0
VOICE CHANGE: 0
SHORTNESS OF BREATH: 0
TROUBLE SWALLOWING: 0

## 2022-10-28 NOTE — PROGRESS NOTES
Chief Complaint   Patient presents with    Follow-up     Patient is here for his cancer recheck. HPI:  Chris Rodríguez is a 78 y.o. male seen in follow-up for his HPV-related R BOT SCCA- he completed chemoRT back on 10/4/21. Last seen back on 8/15/22. Doing well since then with no sore throat, change in swallowing, hoarseness, or hemoptysis. He denies any other new H&N complaints. Past Medical History, Past Surgical History, Family history, Social History, and Medications were all reviewed with the patient today and updated as necessary. 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 prochlorperazine (COMPAZINE) 10 MG tablet Take 10 mg by mouth every 6 hours as needed     No current facility-administered medications for this visit.      Past Medical History:   Diagnosis Date    Arthritis     Cancer of base of tongue (HCC)     High cholesterol     Thyroid disease      Social History     Tobacco Use    Smoking status: Former     Types: Cigarettes     Quit date: 1970     Years since quittin.3    Smokeless tobacco: Never   Substance Use Topics    Alcohol use: No     Alcohol/week: 0.0 standard drinks     Past Surgical History:   Procedure Laterality Date    COLONOSCOPY  10/26/2015    COLONOSCOPY  10/26/2015    HEENT Right 2021    DL/E w/ bx of R BOT neoplasm- Patrice Yang     Family History   Problem Relation Age of Onset    No Known Problems Maternal Grandfather     No Known Problems Paternal Grandfather     Hypertension Sister     No Known Problems Maternal Grandmother     Heart Attack Father     Cancer Mother     Alcohol Abuse Brother         ROS:    Review of Systems   Constitutional:  Negative for fever. HENT:  Negative for trouble swallowing and voice change. Eyes:  Negative for visual disturbance. Respiratory:  Negative for shortness of breath. Cardiovascular:  Negative for chest pain. Gastrointestinal:  Negative for abdominal pain. Skin:  Negative for rash. Neurological:  Negative for dizziness and headaches. Hematological:  Negative for adenopathy. Psychiatric/Behavioral:  Negative for agitation. PHYSICAL EXAM:    Ht 6' (1.829 m)   Wt 152 lb 3.2 oz (69 kg)   BMI 20.64 kg/m²     General: NAD, well-appearing, thin  Neuro: No gross neuro deficits. No facial weakness. Eyes: No periorbital edema/ecchymosis. No nystagmus. Skin: No facial erythema, rashes or concerning lesions. Nose: No external deviations or saddling. Intranasally, septum is dev off to R side without perforations, nasal mucosa appears healthy with no erythema, mucopurulence, or polyps. Mouth: Moist mucus membranes, normal tongue/palate mobility, no concerning mucosal lesions. Oropharynx clear with no erythema/exudate, no tonsillar hypertrophy. BOT is soft on palpation. Ears: Normal appearing auricles, no hematomas. EACs clear with no cerumen impaction, healthy canal skin, TM's intact with no perforations or retraction pockets. No middle ear effusions. Neck: Soft, supple, no palpable neck masses. No palpable parotid or submandibular masses. No thyromegaly or palpable thyroid nodules. Lymphatics: No palpable cervical LAD. Resp: No audible stridor or wheezing. Extremities: No clubbing or cyanosis.      PROCEDURE: Flexible laryngoscopy  INDICATIONS: BOT cancer  DESCRIPTION: After verbal consent was obtained and a timeout was performed, the flexible scope was advanced down one of the patient's nares into the nasopharynx. The nasal cavity and nasopharynx were clear. The scope was then turned distally down towards the oropharynx. The BOT and vallecula were clear and the epiglottis was normal in appearance. There was some generalized edema of tongue base but no concerning masses. Both aryepiglottic folds were clear and there was a normal appearing glottis w/ healthy TVCs. No nodules or polyps and the cords were fully mobile. No concerning lesions seen along post-cricoid region or within either piriform sinus. The posterior pharyngeal was clear as well. The scope was then carefully removed. The patient tolerated the procedure well and there were no complications. ASSESSMENT and PLAN      ICD-10-CM    1. Carcinoma of base of tongue ([facility] [address]  C01 LARYNGOSCOPY,FLEX FIBER,DIAGNOSTIC        He is doing well now over 1 year out from completion of therapy for his right BOT SCCA. There was no evidence of disease on his laryngoscopy today.   RTC in 3 months for routine cancer surveillance    Leonel Du MD  10/28/2022    Electronically signed by Leonel Du MD on 10/28/2022 at 1:22 PM

## 2022-11-04 ENCOUNTER — OFFICE VISIT (OUTPATIENT)
Dept: FAMILY MEDICINE CLINIC | Facility: CLINIC | Age: 77
End: 2022-11-04
Payer: MEDICARE

## 2022-11-04 VITALS
OXYGEN SATURATION: 99 % | SYSTOLIC BLOOD PRESSURE: 122 MMHG | HEIGHT: 72 IN | WEIGHT: 151.8 LBS | BODY MASS INDEX: 20.56 KG/M2 | DIASTOLIC BLOOD PRESSURE: 80 MMHG | TEMPERATURE: 98.4 F | HEART RATE: 84 BPM

## 2022-11-04 DIAGNOSIS — Z12.5 SCREENING PSA (PROSTATE SPECIFIC ANTIGEN): ICD-10-CM

## 2022-11-04 DIAGNOSIS — Z11.59 ENCOUNTER FOR HEPATITIS C SCREENING TEST FOR LOW RISK PATIENT: ICD-10-CM

## 2022-11-04 DIAGNOSIS — D64.9 NORMOCYTIC ANEMIA: ICD-10-CM

## 2022-11-04 DIAGNOSIS — C01 SQUAMOUS CELL CARCINOMA OF BASE OF TONGUE (HCC): ICD-10-CM

## 2022-11-04 DIAGNOSIS — E03.9 ACQUIRED HYPOTHYROIDISM: Primary | ICD-10-CM

## 2022-11-04 DIAGNOSIS — M19.90 ARTHRITIS: ICD-10-CM

## 2022-11-04 PROBLEM — R21 RASH: Status: RESOLVED | Noted: 2021-08-16 | Resolved: 2022-11-04

## 2022-11-04 PROBLEM — E87.6 HYPOKALEMIA: Status: RESOLVED | Noted: 2021-09-24 | Resolved: 2022-11-04

## 2022-11-04 PROBLEM — E83.42 HYPOMAGNESEMIA: Status: RESOLVED | Noted: 2021-09-24 | Resolved: 2022-11-04

## 2022-11-04 PROBLEM — N18.2 CKD (CHRONIC KIDNEY DISEASE) STAGE 2, GFR 60-89 ML/MIN: Status: ACTIVE | Noted: 2022-11-04

## 2022-11-04 LAB
HCV AB SER QL: NONREACTIVE
PSA SERPL-MCNC: 1 NG/ML

## 2022-11-04 PROCEDURE — 1123F ACP DISCUSS/DSCN MKR DOCD: CPT | Performed by: STUDENT IN AN ORGANIZED HEALTH CARE EDUCATION/TRAINING PROGRAM

## 2022-11-04 PROCEDURE — 99214 OFFICE O/P EST MOD 30 MIN: CPT | Performed by: STUDENT IN AN ORGANIZED HEALTH CARE EDUCATION/TRAINING PROGRAM

## 2022-11-04 RX ORDER — CELECOXIB 200 MG/1
200 CAPSULE ORAL DAILY
Qty: 90 CAPSULE | Refills: 3 | Status: SHIPPED | OUTPATIENT
Start: 2022-11-04

## 2022-11-04 RX ORDER — LEVOTHYROXINE SODIUM 0.1 MG/1
100 TABLET ORAL
Qty: 90 TABLET | Refills: 3 | Status: SHIPPED | OUTPATIENT
Start: 2022-11-04

## 2022-11-04 SDOH — ECONOMIC STABILITY: FOOD INSECURITY: WITHIN THE PAST 12 MONTHS, THE FOOD YOU BOUGHT JUST DIDN'T LAST AND YOU DIDN'T HAVE MONEY TO GET MORE.: NEVER TRUE

## 2022-11-04 SDOH — ECONOMIC STABILITY: FOOD INSECURITY: WITHIN THE PAST 12 MONTHS, YOU WORRIED THAT YOUR FOOD WOULD RUN OUT BEFORE YOU GOT MONEY TO BUY MORE.: NEVER TRUE

## 2022-11-04 ASSESSMENT — ANXIETY QUESTIONNAIRES
7. FEELING AFRAID AS IF SOMETHING AWFUL MIGHT HAPPEN: 0
6. BECOMING EASILY ANNOYED OR IRRITABLE: 0
3. WORRYING TOO MUCH ABOUT DIFFERENT THINGS: 0
5. BEING SO RESTLESS THAT IT IS HARD TO SIT STILL: 0
1. FEELING NERVOUS, ANXIOUS, OR ON EDGE: 0
GAD7 TOTAL SCORE: 0
2. NOT BEING ABLE TO STOP OR CONTROL WORRYING: 0
4. TROUBLE RELAXING: 0

## 2022-11-04 ASSESSMENT — PATIENT HEALTH QUESTIONNAIRE - PHQ9
2. FEELING DOWN, DEPRESSED OR HOPELESS: 0
SUM OF ALL RESPONSES TO PHQ QUESTIONS 1-9: 0
SUM OF ALL RESPONSES TO PHQ9 QUESTIONS 1 & 2: 0
SUM OF ALL RESPONSES TO PHQ QUESTIONS 1-9: 0
1. LITTLE INTEREST OR PLEASURE IN DOING THINGS: 0

## 2022-11-04 ASSESSMENT — SOCIAL DETERMINANTS OF HEALTH (SDOH): HOW HARD IS IT FOR YOU TO PAY FOR THE VERY BASICS LIKE FOOD, HOUSING, MEDICAL CARE, AND HEATING?: NOT HARD AT ALL

## 2022-11-04 NOTE — PROGRESS NOTES
[facility]  VinodMaria Ines Luong  Phone [phone]  Fax:  [phone]    Patient: Antoine Caruso  YOB: 1945  Patient Age 78 y.o. Patient sex: male  Medical Record:  [id number]  Visit Date: 22    [facility] Practice Clinic Note     Chief Complaint   Patient presents with    New Patient       History of Present Illness:  57-year-old white male history of hypothyroidism and tongue cancer presents for routine follow-up. Following with oncology and otolaryngology for carcinoma base of tongue. Status post 7 weeks of chemo and 35 rounds of radiation. Patient has no questions or concerns today. Dani any chest pain, shortness of breath, abdominal pain, fever, chills, or any other symptoms at this time. Checking routine labs today. Declines AAA screening and colonoscopy. Routine follow-up in 1 year with labs at that time or sooner if needed.            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)    Pure hypercholesterolemia    Pancytopenia due to antineoplastic chemotherapy (HCC)    Acute deep vein thrombosis (DVT) of right upper extremity, unspecified vein (HCC)    Acquired hypothyroidism    Normocytic anemia    CKD (chronic kidney disease) stage 2, GFR 60-89 ml/min       Social History:   Social History     Tobacco Use    Smoking status: Former     Types: Cigarettes     Quit date: 1970     Years since quittin.3    Smokeless tobacco: Never   Substance Use Topics    Alcohol use: No     Alcohol/week: 0.0 standard drinks antigen)  -     PSA Screening; Future    Arthritis  -     celecoxib (CELEBREX) 200 MG capsule; Take 1 capsule by mouth daily      I have reviewed the patient's past medical history, social history and family history and vitals. We have discussed treatment plan and follow up and given patient instructions. Patient's questions are answered and we will follow up as indicated.         Alexsandra Baca, DO

## 2023-01-04 DIAGNOSIS — C01 MALIGNANT NEOPLASM OF BASE OF TONGUE (HCC): Primary | ICD-10-CM

## 2023-01-05 ENCOUNTER — RESEARCH ENCOUNTER (OUTPATIENT)
Dept: RESEARCH | Age: 78
End: 2023-01-05

## 2023-01-06 NOTE — RESEARCH
Subject due for 6 mth surveys on  1805CD. Subject was seen by ENT on 10/28/22 with a clear laryngoscopy. He is scheduled for a CT of the neck on 1/9/23. At this time, he still meets eligibility for study. Subject completed surveys remotely via [employer] program and site was notified by email of survey completion. Subject also reached out to site to notify us that he has completed the surveys. Subject will be in clinic on 1/11/23 and will be given gift cards for his completion of the 6 mth surveys. Subject stated previously that he prefers physical gift cards rather then virtual ones. He is due for 9 mth surveys 4/3/23 + 30 days per protocol. Research will continue to follow.

## 2023-01-09 ENCOUNTER — HOSPITAL ENCOUNTER (OUTPATIENT)
Dept: CT IMAGING | Age: 78
Discharge: HOME | End: 2023-01-12
Payer: COMMERCIAL

## 2023-01-09 DIAGNOSIS — C01 MALIGNANT NEOPLASM OF BASE OF TONGUE (HCC): ICD-10-CM

## 2023-01-09 LAB — CREAT BLD-MCNC: 0.97 MG/DL (ref 0.8–1.5)

## 2023-01-09 PROCEDURE — 82565 ASSAY OF CREATININE: CPT

## 2023-01-09 PROCEDURE — 70491 CT SOFT TISSUE NECK W/DYE: CPT

## 2023-01-09 PROCEDURE — 2580000003 HC RX 258: Performed by: INTERNAL MEDICINE

## 2023-01-09 PROCEDURE — 6360000004 HC RX CONTRAST MEDICATION: Performed by: INTERNAL MEDICINE

## 2023-01-09 RX ORDER — 0.9 % SODIUM CHLORIDE 0.9 %
100 INTRAVENOUS SOLUTION INTRAVENOUS
Status: COMPLETED | OUTPATIENT
Start: 2023-01-09 | End: 2023-01-09

## 2023-01-09 RX ORDER — SODIUM CHLORIDE 0.9 % (FLUSH) 0.9 %
10 SYRINGE (ML) INJECTION
Status: COMPLETED | OUTPATIENT
Start: 2023-01-09 | End: 2023-01-09

## 2023-01-09 RX ADMIN — SODIUM CHLORIDE, PRESERVATIVE FREE 10 ML: 5 INJECTION INTRAVENOUS at 10:21

## 2023-01-09 RX ADMIN — IOPAMIDOL 80 ML: 755 INJECTION, SOLUTION INTRAVENOUS at 10:21

## 2023-01-09 RX ADMIN — SODIUM CHLORIDE 100 ML: 9 INJECTION, SOLUTION INTRAVENOUS at 10:21

## 2023-01-10 NOTE — PROGRESS NOTES
HEMATOLOGY/ONCOLOGY FOLLOWUP  VISIT      Patient Name: Rosi Saxena             Date of Visit: 2023  : 1945  Age:77 y.o. Presenting Complaint:  Rosi Saxena  is seen in follow-up for a base of tongue carcinoma. Overview: Copied from prior  Mr. Rafaela Yee was first seen in our office in 2021. He was a gentleman who was referred for management of a head neck cancer. Beginning in approximately 2021 following his initial Covid  vaccination, he noted some swelling in his right neck. This was treated empirically with antibiotics and resulted in prompt resolution. He subsequently had a recurrence of the swelling about a month or 2 later and once again was treated with antibiotics  with improvement. In  however he had yet another bout of swelling in the right neck and was referred to ENT for evaluation. CT scanning performed on  showed a 3.0 x 3.2 cm enhancing mass within the oropharynx on the right at the level of the  base of tongue. There was also an enlarged jugulodigastric node on the right measuring 2.8 x 1.5 cm. Dr. Jody Rubin saw him for the first time on . Coincidentally, the patient experienced a bout of hemoptysis that morning which she described as  bright red but limited in quantity. Dr. Jody Rubin confirmed the presence of a base of tongue lesion on the right side which crossed midline but did not involve the lateral pharyngeal wall or epiglottis. He was taken to the operating room 2 days later  and underwent a direct laryngoscopy with biopsies. 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 PET scanning was performed on  and notable for a hypermetabolic  2.5 cm mass at the right base of tongue with an enlarged 19 mm hypermetabolic right level 2 lymph node and a smaller 11 mm hypermetabolic left level 2 lymph node.   There were no findings in the chest.  There was a focal area of FDG avidity in the lower  rectal wall with a question of some soft tissue thickening on the noncontrast CT. Correlation with colonoscopy was recommended. His last colonoscopy was in 2015. The patient has noted no swallowing difficulties he has had one further bout of mild hemoptysis. He returns today for follow-up. He continues to do extremely well. His only complaint is that his sense of taste is not as acute as he would like it to be but nevertheless much improved over what it was during treatment. He has some mild xerostomia but this does not appear to be bothersome. His activity level is back to normal.  His appetite is excellent and he has no difficulty with swallowing. He denies pain of any kind. His most recent ENT evaluation has demonstrated no evidence of recurrent disease and he is due for another evaluation in a few weeks. Interval history:  He presents today as transfer of care from [address] [place] [address]. On evaluation today, patient feels very well. He developed heartburn after eating a piece of what sounds like somewhat undercooked meat about a week ago and had to take over-the-counter antacids. However symptoms have since resolved. He has gained weight and has been exercising. He denies headache, chest pain, cough, shortness of breath, odynophagia, dysphagia, nausea, vomiting, black or bloody stools, arm pain or swelling. Medications:   Current Outpatient Medications   Medication Sig Dispense Refill    levothyroxine (SYNTHROID) 100 MCG tablet Take 1 tablet by mouth every morning (before breakfast) 90 tablet 3    celecoxib (CELEBREX) 200 MG capsule Take 1 capsule by mouth daily 90 capsule 3     No current facility-administered medications for this visit. Allergies:  No Known Allergies    Review of Systems: The Review of Systems is documented in full in the internal medical record. All systems are negative other than for those noted above.      Past Medical History:  Documented in electronic medical record    Past Surgical History:  Documented in electronic medical record    Social History:  Documented in electronic medical record    Family History:  Documented in electronic medical record      Physical Examination:  General Appearance: Healthy appearing patient in no acute distress. Vital signs: BP (!) 143/72   Pulse 69   Temp 97.5 °F (36.4 °C)   Resp 16   Ht 6' (1.829 m)   Wt 166 lb 3.2 oz (75.4 kg)   SpO2 99%   BMI 22.54 kg/m²     Performance status: ECOG Level: 0  Distress  Screening Score: PHQ-9 Total Score: 0 (1/11/2023 10:45 AM)  Thoughts that you would be better off dead, or of hurting yourself in some way: 0 (1/11/2023 10:45 AM)  Pain Score:   0 - No pain  Wt Readings from Last 3 Encounters:   01/11/23 166 lb 3.2 oz (75.4 kg)   11/04/22 151 lb 12.8 oz (68.9 kg)   10/28/22 152 lb 3.2 oz (69 kg)       HEENT: Scarring along the left tonsillar pillar otherwise no lesions. Neck: Supple. There is no thyromegaly. Lymph nodes: There is no cervical, supraclavicular, axillary or inguinal adenopathy. Lungs: The lungs are clear to auscultation and percussion. There is no egophony. There is no chest wall tenderness and no  use of accessory respiratory musculature. Heart: There is no jugular venous distention. The rate is normal and rhythm regular. The S1 and S2 are normal and there are no murmurs or rubs. Abdomen: Soft, non-tender, bowel sounds present and normal, no appreciated hepatosplenomegaly. No palpable masses. Skin: No rash, petechiae or ecchymoses. No evidence of malignancy. Extremities: No cyanosis, clubbing or edema.      Labs/Imaging:  Lab Results   Component Value Date    WBC 3.0 (L) 01/11/2023    HGB 11.8 (L) 01/11/2023    HCT 35.0 01/11/2023    MCV 92.8 01/11/2023     01/11/2023    LYMPHOPCT 10 (L) 01/11/2023    RBC 3.77 (L) 01/11/2023    MCH 31.3 01/11/2023    MCHC 33.7 01/11/2023    RDW 12.3 01/11/2023     Lab Results Component Value Date     10/06/2022    K 3.9 10/06/2022     10/06/2022    CO2 29 10/06/2022    BUN 17 10/06/2022    CREATININE 0.97 01/09/2023    GLUCOSE 84 10/06/2022    CALCIUM 9.5 10/06/2022    PROT 7.2 10/06/2022    LABALBU 3.9 10/06/2022    BILITOT 0.7 10/06/2022    ALKPHOS 64 10/06/2022    AST 17 10/06/2022    ALT 19 10/06/2022    LABGLOM >60 10/06/2022    GFRAA >60 07/07/2022    AGRATIO 1.0 (L) 04/01/2022    GLOB 3.3 10/06/2022             CT Result (most recent):  CT SOFT TISSUE NECK W CONTRAST 01/09/2023    Narrative  EXAMINATION: CT SOFT TISSUE NECK W CONTRAST 1/9/2023 10:27 AM    ACCESSION NUMBER: [id number]    COMPARISON: CT neck soft tissues 7/1/2022 and earlier. PET/CT 12/29/2021. INDICATION: Malignant neoplasm of base of tongue    TECHNIQUE: Multiple contiguous axial CT images of the neck were obtained from  the skull base to the lung apices after the intravenous administration of 75 mL  Isovue 370. Reformats are provided. Radiation dose reduction techniques were used for this study. Our CT scanners  use one or all of the following: Automated exposure control, adjustment of the  mA and/or kV according to patient size, iterative reconstruction. FINDINGS:  The visualized intracranial structures are unremarkable. The visualized  paranasal sinuses and mastoid air cells are pneumatized and free of fluid. The nasal cavity and nasopharynx are unremarkable. The mass at the base of tongue is again difficult to distinguish from normal  pharyngeal soft tissue, though it appears to measure approximately 1.3 cm (axial  image 64), which is grossly stable from prior when remeasured on prior exam from  7/1/2022. The larynx and hypopharynx are unremarkable. The bilateral parotid and submandibular glands are unremarkable. The thyroid  gland is unremarkable. No enlarged cervical lymph nodes are seen. The visualized vascular structures are unremarkable.     The visualized lung apices are clear. No acute osseous antibody. No suspicious  osseous lesion. Degenerative changes of the spine, most pronounced at C5-C6. Impression  -Stable exam without evidence of disease progression. Above results reviewed with patient. There was no evidence of disease on his laryngoscopy by Dr. Mariah Moya (10/28/22)     ASSESSMENT:   This gentleman had a stage II (T2, N2, M0 p16 positive) squamous cell carcinoma of the base of tongue. He  received radiation therapy concurrent with weekly doses of cisplatin given at a dose of 40  mg/m². He is doing extremely well with very few residual toxicities from therapy. Right arm DVT-doing well off anticoagulation. Mild pancytopenia-stable to improved on review of labs today       PLAN:  Patient is clinically doing very well. I reviewed the results of most recent labs and imaging with the patient. Mild leukopenia and anemia have remained stable compared to recent labs. Patient is scheduled to see Dr. Mariah Moya next month for a laryngoscopic examination. CT neck showed no evidence of disease recurrence. ROV in 3 months with follow up CT in 6 months. Pt knows to call sooner with questions/concerns. All questions were answered to the best of my abilities.       Elsa Abbott MD  [facility] Hematology and Oncology  [address] [place] [address]  Office : [phone]  Fax : [phone]    [email or web address]

## 2023-01-11 ENCOUNTER — OFFICE VISIT (OUTPATIENT)
Dept: ONCOLOGY | Age: 78
End: 2023-01-11

## 2023-01-11 ENCOUNTER — TELEPHONE (OUTPATIENT)
Dept: FAMILY MEDICINE CLINIC | Facility: CLINIC | Age: 78
End: 2023-01-11

## 2023-01-11 ENCOUNTER — OFFICE VISIT (OUTPATIENT)
Dept: ONCOLOGY | Age: 78
End: 2023-01-11
Payer: COMMERCIAL

## 2023-01-11 ENCOUNTER — RESEARCH ENCOUNTER (OUTPATIENT)
Dept: RESEARCH | Age: 78
End: 2023-01-11

## 2023-01-11 ENCOUNTER — HOSPITAL ENCOUNTER (OUTPATIENT)
Dept: LAB | Age: 78
Discharge: HOME | End: 2023-01-14
Payer: COMMERCIAL

## 2023-01-11 VITALS
DIASTOLIC BLOOD PRESSURE: 72 MMHG | TEMPERATURE: 97.5 F | HEIGHT: 72 IN | RESPIRATION RATE: 16 BRPM | HEART RATE: 69 BPM | SYSTOLIC BLOOD PRESSURE: 143 MMHG | WEIGHT: 166.2 LBS | BODY MASS INDEX: 22.51 KG/M2 | OXYGEN SATURATION: 99 %

## 2023-01-11 DIAGNOSIS — C01 MALIGNANT NEOPLASM OF BASE OF TONGUE (HCC): Primary | ICD-10-CM

## 2023-01-11 DIAGNOSIS — C01 MALIGNANT NEOPLASM OF BASE OF TONGUE (HCC): ICD-10-CM

## 2023-01-11 DIAGNOSIS — Z00.8 NUTRITIONAL ASSESSMENT: Primary | ICD-10-CM

## 2023-01-11 LAB
ALBUMIN SERPL-MCNC: 3.8 G/DL (ref 3.2–4.6)
ALBUMIN/GLOB SERPL: 1.1 (ref 0.4–1.6)
ALP SERPL-CCNC: 78 U/L (ref 50–136)
ALT SERPL-CCNC: 16 U/L (ref 12–65)
ANION GAP SERPL CALC-SCNC: 5 MMOL/L (ref 2–11)
AST SERPL-CCNC: 15 U/L (ref 15–37)
BASOPHILS # BLD: 0 K/UL (ref 0–0.2)
BASOPHILS NFR BLD: 1 % (ref 0–2)
BILIRUB SERPL-MCNC: 0.5 MG/DL (ref 0.2–1.1)
BUN SERPL-MCNC: 18 MG/DL (ref 8–23)
CALCIUM SERPL-MCNC: 9.1 MG/DL (ref 8.3–10.4)
CHLORIDE SERPL-SCNC: 106 MMOL/L (ref 101–110)
CO2 SERPL-SCNC: 28 MMOL/L (ref 21–32)
CREAT SERPL-MCNC: 1.2 MG/DL (ref 0.8–1.5)
DIFFERENTIAL METHOD BLD: ABNORMAL
EOSINOPHIL # BLD: 0.1 K/UL (ref 0–0.8)
EOSINOPHIL NFR BLD: 3 % (ref 0.5–7.8)
ERYTHROCYTE [DISTWIDTH] IN BLOOD BY AUTOMATED COUNT: 12.3 % (ref 11.9–14.6)
GLOBULIN SER CALC-MCNC: 3.5 G/DL (ref 2.8–4.5)
GLUCOSE SERPL-MCNC: 97 MG/DL (ref 65–100)
HCT VFR BLD AUTO: 35 %
HGB BLD-MCNC: 11.8 G/DL (ref 13.6–17.2)
IMM GRANULOCYTES # BLD AUTO: 0 K/UL (ref 0–0.5)
IMM GRANULOCYTES NFR BLD AUTO: 0 % (ref 0–5)
LYMPHOCYTES # BLD: 0.3 K/UL (ref 0.5–4.6)
LYMPHOCYTES NFR BLD: 10 % (ref 13–44)
MCH RBC QN AUTO: 31.3 PG (ref 26.1–32.9)
MCHC RBC AUTO-ENTMCNC: 33.7 G/DL (ref 31.4–35)
MCV RBC AUTO: 92.8 FL (ref 82–102)
MONOCYTES # BLD: 0.5 K/UL (ref 0.1–1.3)
MONOCYTES NFR BLD: 18 % (ref 4–12)
NEUTS SEG # BLD: 2 K/UL (ref 1.7–8.2)
NEUTS SEG NFR BLD: 68 % (ref 43–78)
NRBC # BLD: 0 K/UL (ref 0–0.2)
PLATELET # BLD AUTO: 154 K/UL (ref 150–450)
PMV BLD AUTO: 8.3 FL (ref 9.4–12.3)
POTASSIUM SERPL-SCNC: 3.9 MMOL/L (ref 3.5–5.1)
PROT SERPL-MCNC: 7.3 G/DL (ref 6.3–8.2)
RBC # BLD AUTO: 3.77 M/UL (ref 4.23–5.6)
SODIUM SERPL-SCNC: 139 MMOL/L (ref 133–143)
T4 FREE SERPL-MCNC: 1 NG/DL (ref 0.78–1.4)
TSH, 3RD GENERATION: 10.1 UIU/ML (ref 0.36–3.74)
WBC # BLD AUTO: 3 K/UL (ref 4.3–11.1)

## 2023-01-11 PROCEDURE — 80053 COMPREHEN METABOLIC PANEL: CPT

## 2023-01-11 PROCEDURE — 1123F ACP DISCUSS/DSCN MKR DOCD: CPT | Performed by: INTERNAL MEDICINE

## 2023-01-11 PROCEDURE — 85025 COMPLETE CBC W/AUTO DIFF WBC: CPT

## 2023-01-11 PROCEDURE — 36415 COLL VENOUS BLD VENIPUNCTURE: CPT

## 2023-01-11 PROCEDURE — 99213 OFFICE O/P EST LOW 20 MIN: CPT | Performed by: INTERNAL MEDICINE

## 2023-01-11 PROCEDURE — 84439 ASSAY OF FREE THYROXINE: CPT

## 2023-01-11 PROCEDURE — 84443 ASSAY THYROID STIM HORMONE: CPT

## 2023-01-11 ASSESSMENT — PATIENT HEALTH QUESTIONNAIRE - PHQ9
3. TROUBLE FALLING OR STAYING ASLEEP: 0
SUM OF ALL RESPONSES TO PHQ QUESTIONS 1-9: 0
SUM OF ALL RESPONSES TO PHQ9 QUESTIONS 1 & 2: 0
SUM OF ALL RESPONSES TO PHQ QUESTIONS 1-9: 0
5. POOR APPETITE OR OVEREATING: 0
8. MOVING OR SPEAKING SO SLOWLY THAT OTHER PEOPLE COULD HAVE NOTICED. OR THE OPPOSITE, BEING SO FIGETY OR RESTLESS THAT YOU HAVE BEEN MOVING AROUND A LOT MORE THAN USUAL: 0
6. FEELING BAD ABOUT YOURSELF - OR THAT YOU ARE A FAILURE OR HAVE LET YOURSELF OR YOUR FAMILY DOWN: 0
2. FEELING DOWN, DEPRESSED OR HOPELESS: 0
10. IF YOU CHECKED OFF ANY PROBLEMS, HOW DIFFICULT HAVE THESE PROBLEMS MADE IT FOR YOU TO DO YOUR WORK, TAKE CARE OF THINGS AT HOME, OR GET ALONG WITH OTHER PEOPLE: 0
9. THOUGHTS THAT YOU WOULD BE BETTER OFF DEAD, OR OF HURTING YOURSELF: 0
7. TROUBLE CONCENTRATING ON THINGS, SUCH AS READING THE NEWSPAPER OR WATCHING TELEVISION: 0
SUM OF ALL RESPONSES TO PHQ QUESTIONS 1-9: 0
SUM OF ALL RESPONSES TO PHQ QUESTIONS 1-9: 0
1. LITTLE INTEREST OR PLEASURE IN DOING THINGS: 0
4. FEELING TIRED OR HAVING LITTLE ENERGY: 0

## 2023-01-11 ASSESSMENT — ANXIETY QUESTIONNAIRES
7. FEELING AFRAID AS IF SOMETHING AWFUL MIGHT HAPPEN: 0
GAD7 TOTAL SCORE: 0
2. NOT BEING ABLE TO STOP OR CONTROL WORRYING: 0
1. FEELING NERVOUS, ANXIOUS, OR ON EDGE: 0
4. TROUBLE RELAXING: 0
5. BEING SO RESTLESS THAT IT IS HARD TO SIT STILL: 0
3. WORRYING TOO MUCH ABOUT DIFFERENT THINGS: 0
IF YOU CHECKED OFF ANY PROBLEMS ON THIS QUESTIONNAIRE, HOW DIFFICULT HAVE THESE PROBLEMS MADE IT FOR YOU TO DO YOUR WORK, TAKE CARE OF THINGS AT HOME, OR GET ALONG WITH OTHER PEOPLE: NOT DIFFICULT AT ALL
6. BECOMING EASILY ANNOYED OR IRRITABLE: 0

## 2023-01-11 NOTE — RESEARCH
Subject was seen in the clinic today to provide physical gift cards for 6 mth surveys on [employer] 1805CD. Subject signed for gift cards and they were presented to him. Subject states he is doing well. He agrees to continue study participation. Reminded subject 9 mth surveys will be in Apr 2023. Subject verbalized understanding.

## 2023-01-11 NOTE — TELEPHONE ENCOUNTER
----- Message from Kalyn Sharan sent at 1/11/2023  3:03 PM EST -----  Subject: Message to Provider    QUESTIONS  Information for Provider? Patient was seen by his oncologist today on   1/11/23 and was given some lab result. His TSH 3rd gen test came back high   at 10.10. He was wondering what's the next steps. Please advise.   ---------------------------------------------------------------------------  --------------  Cathy SPIVEY  [phone]; OK to leave message on voicemail  ---------------------------------------------------------------------------  --------------  SCRIPT ANSWERS  Relationship to Patient?  Self

## 2023-01-11 NOTE — PATIENT INSTRUCTIONS
Patient Instructions from Today's Visit    Reason for Visit:  Follow up for H&N Cancer - CT scan     Plan:   Your CT scan shows no evidence of cancer     Continue to follow up with Dr. Brandt Gitelman as scheduled     We will repeat a CT scan in 6 months     Follow Up:  6 months with Dr. Yue Macdonald Results:  Hospital Outpatient Visit on 01/11/2023   Component Date Value Ref Range Status    WBC 01/11/2023 3.0 (A)  4.3 - 11.1 K/uL Final    RBC 01/11/2023 3.77 (A)  4.23 - 5.6 M/uL Final    Hemoglobin 01/11/2023 11.8 (A)  13.6 - 17.2 g/dL Final    Hematocrit 01/11/2023 35.0  % Final    MCV 01/11/2023 92.8  82.0 - 102.0 FL Final    MCH 01/11/2023 31.3  26.1 - 32.9 PG Final    MCHC 01/11/2023 33.7  31.4 - 35.0 g/dL Final    RDW 01/11/2023 12.3  11.9 - 14.6 % Final    Platelets 69/45/7237 154  150 - 450 K/uL Final    MPV 01/11/2023 8.3 (A)  9.4 - 12.3 FL Final    nRBC 01/11/2023 0.00  0.0 - 0.2 K/uL Final    **Note: Absolute NRBC parameter is now reported with Hemogram**    Differential Type 01/11/2023 AUTOMATED    Final    Seg Neutrophils 01/11/2023 68  43 - 78 % Final    Lymphocytes 01/11/2023 10 (A)  13 - 44 % Final    Monocytes 01/11/2023 18 (A)  4.0 - 12.0 % Final    Eosinophils % 01/11/2023 3  0.5 - 7.8 % Final    Basophils 01/11/2023 1  0.0 - 2.0 % Final    Immature Granulocytes 01/11/2023 0  0.0 - 5.0 % Final    Segs Absolute 01/11/2023 2.0  1.7 - 8.2 K/UL Final    Absolute Lymph # 01/11/2023 0.3 (A)  0.5 - 4.6 K/UL Final    Absolute Mono # 01/11/2023 0.5  0.1 - 1.3 K/UL Final    Absolute Eos # 01/11/2023 0.1  0.0 - 0.8 K/UL Final    Basophils Absolute 01/11/2023 0.0  0.0 - 0.2 K/UL Final    Absolute Immature Granulocyte 01/11/2023 0.0  0.0 - 0.5 K/UL Final         Treatment Summary has been discussed and given to patient: NA        -------------------------------------------------------------------------------------------------------------------  Please call our office at [phone] if you have any  of the following symptoms:   Fever of 100.5 or greater  Chills  Shortness of breath  Swelling or pain in one leg    After office hours an answering service is available and will contact a provider for emergencies or if you are experiencing any of the above symptoms. Patient has My Chart. My Chart log in information explained on the after visit summary printout at the [facility] [address] desk.     [address] [facility], [address], [place]  Outpatient Oncology Dietitian  Head and Neck Tumor Navigator  [phone]  [email or web address]

## 2023-01-12 NOTE — PROGRESS NOTES
Nutrition F/U:  Assessment:  Pt seen during office visit w/ Dr. Beatrice Young, finished treatment for H&N cancer ~15 months ago, CT scan showing SRIKANTH, has follow up with Dr. Alexia Otero in February. Pt reports an excellent appetite/po intake, tolerating a Regular diet w/ 1 recent episode of dysphagia to a dry piece of meat, otherwise no issues swallowing. Labs notable for TSH (10.1) - results forwarded to PCP who manages pt's synthroid medication, other nutrition related lab values WNL. Current BW: 166#, up 17# over the past 3 months. Intervention:  1. Continue w/ Regular diet  2. Dr. Alexia Otero on (2/3)  3. 3 months with Dr. Beatrice Young, repeat CT in 6 months. Monitoring/Evaluation:  1. RD to follow up during next office visit - follow up wt status, tolerance/intake of po diet, symptom management.       [address] [place] [address], [address], [address] [facility]

## 2023-02-03 ENCOUNTER — OFFICE VISIT (OUTPATIENT)
Dept: ENT CLINIC | Age: 78
End: 2023-02-03

## 2023-02-03 VITALS — HEIGHT: 72 IN | HEART RATE: 81 BPM | BODY MASS INDEX: 22.21 KG/M2 | WEIGHT: 164 LBS | OXYGEN SATURATION: 99 %

## 2023-02-03 DIAGNOSIS — C01 CANCER OF BASE OF TONGUE (HCC): Primary | ICD-10-CM

## 2023-02-03 ASSESSMENT — ENCOUNTER SYMPTOMS
EYE PAIN: 0
COUGH: 0
DIARRHEA: 0
WHEEZING: 0
VOMITING: 0
COLOR CHANGE: 0

## 2023-02-03 NOTE — PROGRESS NOTES
Chief Complaint   Patient presents with    Follow-up     Patient is here for his follow up on his tongue base cancer. HPI:  Mag Ferreira is a 78 y.o. male seen in follow-up for his HPV-related R BOT SCCA- he completed chemoRT back on 10/4/21. Last seen back on 10/28/22. Doing well since then with no change in swallowing, sore throat, or voice changes. He denies any other new medical complaints. Past Medical History, Past Surgical History, Family history, Social History, and Medications were all reviewed with the patient today and updated as necessary. 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 [address]    Acute deep vein thrombosis (DVT) of right upper extremity, unspecified vein (HCC)    Acquired hypothyroidism    Normocytic anemia    CKD (chronic kidney disease) stage 2, GFR 60-89 ml/min     Current Outpatient Medications   Medication Sig    levothyroxine (SYNTHROID) 100 MCG tablet Take 1 tablet by mouth every morning (before breakfast)    celecoxib (CELEBREX) 200 MG capsule Take 1 capsule by mouth daily     No current facility-administered medications for this visit.      Past Medical History:   Diagnosis Date    Arthritis     Cancer of base of tongue (HCC)     High cholesterol     Thyroid disease      Social History     Tobacco Use    Smoking status: Former     Types: Cigarettes     Quit date: 1970     Years since quittin.6    Smokeless tobacco: Never   Substance Use Topics    Alcohol use: No     Alcohol/week: 0.0 standard drinks     Past Surgical History:   Procedure Laterality Date    COLONOSCOPY  10/26/2015    COLONOSCOPY  10/26/2015    HEENT Right 2021    DL/E w/ bx of R BOT neoplasm- Aldo Heimlich     Family History   Problem Relation Age of Onset    No Known Problems Maternal Grandfather     No Known Problems Paternal Grandfather     Hypertension Sister     No Known Problems Maternal Grandmother     Heart Attack Father     Cancer Mother     Alcohol Abuse Brother         ROS:    Review of Systems   Constitutional:  Negative for chills. Eyes:  Negative for pain. Respiratory:  Negative for cough and wheezing. Cardiovascular:  Negative for chest pain and palpitations. Gastrointestinal:  Negative for diarrhea and vomiting. Skin:  Negative for color change and wound. Allergic/Immunologic: Negative for environmental allergies. Neurological:  Negative for dizziness and headaches. PHYSICAL EXAM:    Pulse 81   Ht 6' (1.829 m)   Wt 164 lb (74.4 kg)   SpO2 99%   BMI 22.24 kg/m²     General: NAD, well-appearing, thin  Neuro: No gross neuro deficits. No facial weakness. Eyes: No periorbital edema/ecchymosis. No nystagmus. Skin: No facial erythema, rashes or concerning lesions. Nose: No external deviations or saddling. Intranasally, septum is dev off to R side without perforations, nasal mucosa appears healthy with no erythema, mucopurulence, or polyps. Mouth: Moist mucus membranes, normal tongue/palate mobility, no concerning mucosal lesions. Oropharynx clear with no erythema/exudate, no tonsillar hypertrophy. BOT is soft on palpation. Ears: Normal appearing auricles, no hematomas. EACs clear with no cerumen impaction, healthy canal skin, TM's intact with no perforations or retraction pockets. No middle ear effusions. Neck: Soft, supple, no palpable neck masses. No palpable parotid or submandibular masses. No thyromegaly or palpable thyroid nodules. Lymphatics: No palpable cervical LAD. Resp: No audible stridor or wheezing. Extremities: No clubbing or cyanosis. PROCEDURE: Flexible laryngoscopy  INDICATIONS: BOT cancer  DESCRIPTION: After verbal consent was obtained and a timeout was performed, the flexible scope was advanced down one of the patient's nares into the nasopharynx.  The nasal cavity and nasopharynx were clear. The scope was then turned distally down towards the oropharynx. The BOT and vallecula were clear and the epiglottis was normal in appearance. There was some generalized edema of tongue base but no concerning masses. Both aryepiglottic folds were clear and there was a normal appearing glottis w/ healthy TVCs. No nodules or polyps and the cords were fully mobile. No concerning lesions seen along post-cricoid region or within either piriform sinus. The posterior pharyngeal was clear as well. The scope was then carefully removed. The patient tolerated the procedure well and there were no complications. ASSESSMENT and PLAN      ICD-10-CM    1. Cancer of base of tongue ([facility] [address]  C01 LARYNGOSCOPY,FLEX FIBER,DIAGNOSTIC        Doing great now 16 months out from completion of treatment for his HPV related right base of tongue SCCA. There was no evidence of disease on laryngoscopy today and he looks and feels great. RTC in 3 months for routine cancer surveillance.     Collin Ventura MD  2/4/2023    Electronically signed by Collin Ventura MD on 2/4/2023 at 9:58 AM

## 2023-04-04 ENCOUNTER — OFFICE VISIT (OUTPATIENT)
Dept: FAMILY MEDICINE CLINIC | Facility: CLINIC | Age: 78
End: 2023-04-04
Payer: COMMERCIAL

## 2023-04-04 VITALS
BODY MASS INDEX: 22.59 KG/M2 | WEIGHT: 166.8 LBS | SYSTOLIC BLOOD PRESSURE: 120 MMHG | TEMPERATURE: 98.1 F | HEART RATE: 90 BPM | OXYGEN SATURATION: 99 % | DIASTOLIC BLOOD PRESSURE: 80 MMHG | HEIGHT: 72 IN

## 2023-04-04 DIAGNOSIS — R68.89 OTHER GENERAL SYMPTOMS AND SIGNS: ICD-10-CM

## 2023-04-04 DIAGNOSIS — J30.2 SEASONAL ALLERGIES: Primary | ICD-10-CM

## 2023-04-04 DIAGNOSIS — C01 MALIGNANT NEOPLASM OF BASE OF TONGUE (HCC): Primary | ICD-10-CM

## 2023-04-04 PROCEDURE — 99213 OFFICE O/P EST LOW 20 MIN: CPT | Performed by: FAMILY MEDICINE

## 2023-04-04 PROCEDURE — 1123F ACP DISCUSS/DSCN MKR DOCD: CPT | Performed by: FAMILY MEDICINE

## 2023-04-04 SDOH — ECONOMIC STABILITY: INCOME INSECURITY: HOW HARD IS IT FOR YOU TO PAY FOR THE VERY BASICS LIKE FOOD, HOUSING, MEDICAL CARE, AND HEATING?: NOT HARD AT ALL

## 2023-04-04 SDOH — ECONOMIC STABILITY: FOOD INSECURITY: WITHIN THE PAST 12 MONTHS, YOU WORRIED THAT YOUR FOOD WOULD RUN OUT BEFORE YOU GOT MONEY TO BUY MORE.: NEVER TRUE

## 2023-04-04 SDOH — ECONOMIC STABILITY: HOUSING INSECURITY
IN THE LAST 12 MONTHS, WAS THERE A TIME WHEN YOU DID NOT HAVE A STEADY PLACE TO SLEEP OR SLEPT IN A SHELTER (INCLUDING NOW)?: NO

## 2023-04-04 SDOH — ECONOMIC STABILITY: FOOD INSECURITY: WITHIN THE PAST 12 MONTHS, THE FOOD YOU BOUGHT JUST DIDN'T LAST AND YOU DIDN'T HAVE MONEY TO GET MORE.: NEVER TRUE

## 2023-04-04 ASSESSMENT — ANXIETY QUESTIONNAIRES
7. FEELING AFRAID AS IF SOMETHING AWFUL MIGHT HAPPEN: 0
6. BECOMING EASILY ANNOYED OR IRRITABLE: 0
4. TROUBLE RELAXING: 0
IF YOU CHECKED OFF ANY PROBLEMS ON THIS QUESTIONNAIRE, HOW DIFFICULT HAVE THESE PROBLEMS MADE IT FOR YOU TO DO YOUR WORK, TAKE CARE OF THINGS AT HOME, OR GET ALONG WITH OTHER PEOPLE: NOT DIFFICULT AT ALL
2. NOT BEING ABLE TO STOP OR CONTROL WORRYING: 0
1. FEELING NERVOUS, ANXIOUS, OR ON EDGE: 0
5. BEING SO RESTLESS THAT IT IS HARD TO SIT STILL: 0
3. WORRYING TOO MUCH ABOUT DIFFERENT THINGS: 0
GAD7 TOTAL SCORE: 0

## 2023-04-04 ASSESSMENT — PATIENT HEALTH QUESTIONNAIRE - PHQ9
SUM OF ALL RESPONSES TO PHQ QUESTIONS 1-9: 0
2. FEELING DOWN, DEPRESSED OR HOPELESS: 0
SUM OF ALL RESPONSES TO PHQ QUESTIONS 1-9: 0
SUM OF ALL RESPONSES TO PHQ QUESTIONS 1-9: 0
1. LITTLE INTEREST OR PLEASURE IN DOING THINGS: 0
SUM OF ALL RESPONSES TO PHQ9 QUESTIONS 1 & 2: 0
SUM OF ALL RESPONSES TO PHQ QUESTIONS 1-9: 0

## 2023-04-04 ASSESSMENT — ENCOUNTER SYMPTOMS: SINUS COMPLAINT: 1

## 2023-04-04 NOTE — PATIENT INSTRUCTIONS
Recommend allergra D - or zyrtec D for the running nose. Continue your flonase - Will take it about a week to start working.

## 2023-04-04 NOTE — PROGRESS NOTES
extremity, unspecified vein (HCC)    Acquired hypothyroidism    Normocytic anemia    CKD (chronic kidney disease) stage 2, GFR 60-89 ml/min       Social History:   Social History     Tobacco Use    Smoking status: Former     Types: Cigarettes     Quit date: 1970     Years since quittin.7    Smokeless tobacco: Never   Substance Use Topics    Alcohol use: No     Alcohol/week: 0.0 standard drinks       Family History:   Family History   Problem Relation Age of Onset    No Known Problems Maternal Grandfather     No Known Problems Paternal Grandfather     Hypertension Sister     No Known Problems Maternal Grandmother     Heart Attack Father     Cancer Mother     Alcohol Abuse Brother        Surgical History:  Past Surgical History:   Procedure Laterality Date    COLONOSCOPY  10/26/2015    COLONOSCOPY  10/26/2015    HEENT Right 2021    DL/E w/ bx of R BOT neoplasm- Kaur       ROS  Review of Systems    Visit Vitals  /80   Pulse 90   Temp 98.1 °F (36.7 °C)   Ht 6' (1.829 m)   Wt 166 lb 12.8 oz (75.7 kg)   SpO2 99%   BMI 22.62 kg/m²   120/80    Physical Exam  Physical Exam  Constitutional:       Appearance: Normal appearance. He is normal weight. HENT:      Head: Normocephalic and atraumatic. Eyes:      Conjunctiva/sclera: Conjunctivae normal.   Cardiovascular:      Rate and Rhythm: Normal rate and regular rhythm. Pulses: Normal pulses. Heart sounds: Normal heart sounds. Pulmonary:      Effort: Pulmonary effort is normal.      Breath sounds: Normal breath sounds. Musculoskeletal:      Cervical back: Normal range of motion and neck supple. Skin:     General: Skin is warm and dry. Neurological:      General: No focal deficit present. Mental Status: He is alert. Psychiatric:         Mood and Affect: Mood normal.         ASSESSMENT & PLAN      I have reviewed the patient's past medical history, social history and family history and vitals.   We have discussed treatment plan and

## 2023-04-06 DIAGNOSIS — J30.2 SEASONAL ALLERGIES: Primary | ICD-10-CM

## 2023-04-06 RX ORDER — OLOPATADINE HYDROCHLORIDE 1 MG/ML
1 SOLUTION/ DROPS OPHTHALMIC 2 TIMES DAILY
Qty: 5 ML | Refills: 1 | Status: SHIPPED | OUTPATIENT
Start: 2023-04-06 | End: 2023-05-06

## 2023-05-11 LAB
AVERAGE GLUCOSE: NORMAL
HBA1C MFR BLD: 4.9 %

## 2023-05-17 ENCOUNTER — OFFICE VISIT (OUTPATIENT)
Dept: ENT CLINIC | Age: 78
End: 2023-05-17
Payer: COMMERCIAL

## 2023-05-17 DIAGNOSIS — C01 CANCER OF BASE OF TONGUE (HCC): Primary | ICD-10-CM

## 2023-05-17 PROCEDURE — 1123F ACP DISCUSS/DSCN MKR DOCD: CPT | Performed by: OTOLARYNGOLOGY

## 2023-05-17 PROCEDURE — 99213 OFFICE O/P EST LOW 20 MIN: CPT | Performed by: OTOLARYNGOLOGY

## 2023-05-17 PROCEDURE — 31575 DIAGNOSTIC LARYNGOSCOPY: CPT | Performed by: OTOLARYNGOLOGY

## 2023-05-17 ASSESSMENT — ENCOUNTER SYMPTOMS
COUGH: 0
COLOR CHANGE: 0
VOMITING: 0
EYE PAIN: 0
DIARRHEA: 0
WHEEZING: 0

## 2023-05-17 NOTE — PROGRESS NOTES
Chief Complaint   Patient presents with    Follow-up     Patient is here for his three month follow up on base of tongue cancer and states that he is doing well. HPI:  Laura Giles is a 70 y.o. male seen in follow-up for his HPV-related R BOT SCCA- he completed chemoRT back on 10/4/21. Last seen back on 2/3/23. Doing great since then with no throat pain, change in swallowing, neck swelling, or oral bleeding. He has had a slight increase in his allergies over the last month, but denies any other new medical complaints. Past Medical History, Past Surgical History, Family history, Social History, and Medications were all reviewed with the patient today and updated as necessary. 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)    Pure hypercholesterolemia    Pancytopenia due to antineoplastic chemotherapy ([facility] [address]    Acute deep vein thrombosis (DVT) of right upper extremity, unspecified vein (HCC)    Acquired hypothyroidism    Normocytic anemia    CKD (chronic kidney disease) stage 2, GFR 60-89 ml/min     Current Outpatient Medications   Medication Sig    levothyroxine (SYNTHROID) 100 MCG tablet Take 1 tablet by mouth every morning (before breakfast)    celecoxib (CELEBREX) 200 MG capsule Take 1 capsule by mouth daily     No current facility-administered medications for this visit.      Past Medical History:   Diagnosis Date    Arthritis     Cancer of base of tongue ([facility] [address]     High cholesterol     Thyroid disease      Social History     Tobacco Use    Smoking status: Former     Types: Cigarettes     Quit date: 1970     Years since quittin.9    Smokeless tobacco: Never   Substance Use Topics    Alcohol use: No     Alcohol/week: 0.0 standard drinks     Past Surgical History:   Procedure Laterality Date    COLONOSCOPY  10/26/2015    COLONOSCOPY  10/26/2015    HEENT Right 2021    DL/E w/ bx of R BOT neoplasm- Lorenzo Figures

## 2023-05-23 ENCOUNTER — TELEPHONE (OUTPATIENT)
Dept: FAMILY MEDICINE CLINIC | Facility: CLINIC | Age: 78
End: 2023-05-23

## 2023-05-23 NOTE — TELEPHONE ENCOUNTER
[facility]  He states he is waiting on you to see if you needs new meds if so [facility] in [facility] please advise or call him

## 2023-05-26 RX ORDER — LEVOTHYROXINE SODIUM 112 UG/1
112 TABLET ORAL DAILY
Qty: 90 TABLET | OUTPATIENT
Start: 2023-05-26

## 2023-05-26 RX ORDER — LEVOTHYROXINE SODIUM 112 UG/1
112 TABLET ORAL DAILY
Qty: 30 TABLET | Refills: 1 | Status: SHIPPED | OUTPATIENT
Start: 2023-05-26

## 2023-05-26 NOTE — TELEPHONE ENCOUNTER
Spoke with Liz Arias and she is changing med to the 112 mcg. Pt is to follow up in 6 weeks for a repeat TSH. Med sent in ,pt notified.

## 2023-07-06 ENCOUNTER — HOSPITAL ENCOUNTER (OUTPATIENT)
Dept: CT IMAGING | Age: 78
Discharge: HOME | End: 2023-07-06
Payer: COMMERCIAL

## 2023-07-06 DIAGNOSIS — C01 MALIGNANT NEOPLASM OF BASE OF TONGUE (HCC): ICD-10-CM

## 2023-07-06 LAB — CREAT BLD-MCNC: 0.91 MG/DL (ref 0.8–1.5)

## 2023-07-06 PROCEDURE — 70491 CT SOFT TISSUE NECK W/DYE: CPT

## 2023-07-06 PROCEDURE — 6360000004 HC RX CONTRAST MEDICATION: Performed by: INTERNAL MEDICINE

## 2023-07-06 PROCEDURE — 82565 ASSAY OF CREATININE: CPT

## 2023-07-06 PROCEDURE — 2580000003 HC RX 258: Performed by: INTERNAL MEDICINE

## 2023-07-06 RX ORDER — SODIUM CHLORIDE 0.9 % (FLUSH) 0.9 %
10 SYRINGE (ML) INJECTION
Status: COMPLETED | OUTPATIENT
Start: 2023-07-06 | End: 2023-07-06

## 2023-07-06 RX ORDER — 0.9 % SODIUM CHLORIDE 0.9 %
100 INTRAVENOUS SOLUTION INTRAVENOUS
Status: COMPLETED | OUTPATIENT
Start: 2023-07-06 | End: 2023-07-06

## 2023-07-06 RX ADMIN — SODIUM CHLORIDE, PRESERVATIVE FREE 10 ML: 5 INJECTION INTRAVENOUS at 08:57

## 2023-07-06 RX ADMIN — SODIUM CHLORIDE 100 ML: 9 INJECTION, SOLUTION INTRAVENOUS at 08:57

## 2023-07-06 RX ADMIN — IOPAMIDOL 80 ML: 755 INJECTION, SOLUTION INTRAVENOUS at 08:56

## 2023-07-10 ENCOUNTER — RESEARCH ENCOUNTER (OUTPATIENT)
Dept: RESEARCH | Age: 78
End: 2023-07-10

## 2023-07-10 NOTE — RESEARCH
Subject due for 12 month surveys on  1805CD. Suvject still meets eligibility. Last ENT visit with scope was 5/17/23 and clear. Subject had recent CT on 7/6/23 which was also clear of disease. Subject completed 12 month surveys remotely. Site received an email today 7/10/23 of survey completion. This is the last visit in this study. Subject will be marked as completed. He has an in clinic visit 7/20/23 where I will see him and provide his last gift cards for study participation and then complete Oncology Medical Record Abstraction per protocol.

## 2023-07-11 ENCOUNTER — NURSE ONLY (OUTPATIENT)
Dept: FAMILY MEDICINE CLINIC | Facility: CLINIC | Age: 78
End: 2023-07-11

## 2023-07-11 DIAGNOSIS — E03.9 ACQUIRED HYPOTHYROIDISM: ICD-10-CM

## 2023-07-11 DIAGNOSIS — E03.9 ACQUIRED HYPOTHYROIDISM: Primary | ICD-10-CM

## 2023-07-11 LAB — TSH W FREE THYROID IF ABNORMAL: 1.76 UIU/ML (ref 0.36–3.74)

## 2023-07-18 RX ORDER — LEVOTHYROXINE SODIUM 112 UG/1
112 TABLET ORAL DAILY
Qty: 90 TABLET | Refills: 1 | Status: SHIPPED | OUTPATIENT
Start: 2023-07-18

## 2023-07-18 NOTE — TELEPHONE ENCOUNTER
----- Message from Aliene Boeck sent at 7/17/2023  9:18 PM EDT -----  Regarding: Thyroid prescription  Contact: [phone]  I currently am with only ten tablets left, and no refills. Pls advise!  ThxAliene Boeck

## 2023-07-19 DIAGNOSIS — R68.89 OTHER GENERAL SYMPTOMS AND SIGNS: ICD-10-CM

## 2023-07-19 DIAGNOSIS — E83.42 HYPOMAGNESEMIA: ICD-10-CM

## 2023-07-19 DIAGNOSIS — C01 MALIGNANT NEOPLASM OF BASE OF TONGUE (HCC): Primary | ICD-10-CM

## 2023-07-19 NOTE — PROGRESS NOTES
[facility] Hematology and Oncology: Office Visit Established Patient    Reason for follow up: Patti Peterson  is seen in follow-up for a base of tongue carcinoma. Overview: (copied from prior)  Mr. Bev Pearce was first seen in our office in August 2021. He was a gentleman who was referred for management of a head neck cancer. Beginning in approximately February 2021 following his initial Covid  vaccination, he noted some swelling in his right neck. This was treated empirically with antibiotics and resulted in prompt resolution. He subsequently had a recurrence of the swelling about a month or 2 later and once again was treated with antibiotics  with improvement. In June however he had yet another bout of swelling in the right neck and was referred to ENT for evaluation. CT scanning performed on July 2 showed a 3.0 x 3.2 cm enhancing mass within the oropharynx on the right at the level of the  base of tongue. There was also an enlarged jugulodigastric node on the right measuring 2.8 x 1.5 cm. Dr. Leola Stallworth saw him for the first time on July 12. Coincidentally, the patient experienced a bout of hemoptysis that morning which she described as  bright red but limited in quantity. Dr. Leola Stallworth confirmed the presence of a base of tongue lesion on the right side which crossed midline but did not involve the lateral pharyngeal wall or epiglottis. He was taken to the operating room 2 days later  and underwent a direct laryngoscopy with biopsies. 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 PET scanning was performed on July 29 and notable for a hypermetabolic  2.5 cm mass at the right base of tongue with an enlarged 19 mm hypermetabolic right level 2 lymph node and a smaller 11 mm hypermetabolic left level 2 lymph node.   There were no findings in the chest.  There was a focal area of FDG avidity in the lower  rectal wall with a

## 2023-07-20 ENCOUNTER — OFFICE VISIT (OUTPATIENT)
Dept: ONCOLOGY | Age: 78
End: 2023-07-20

## 2023-07-20 ENCOUNTER — RESEARCH ENCOUNTER (OUTPATIENT)
Dept: RESEARCH | Age: 78
End: 2023-07-20

## 2023-07-20 ENCOUNTER — HOSPITAL ENCOUNTER (OUTPATIENT)
Dept: LAB | Age: 78
Discharge: HOME | End: 2023-07-20
Payer: COMMERCIAL

## 2023-07-20 ENCOUNTER — OFFICE VISIT (OUTPATIENT)
Dept: ONCOLOGY | Age: 78
End: 2023-07-20
Payer: COMMERCIAL

## 2023-07-20 VITALS
RESPIRATION RATE: 16 BRPM | HEIGHT: 72 IN | TEMPERATURE: 97.8 F | BODY MASS INDEX: 21.88 KG/M2 | OXYGEN SATURATION: 99 % | SYSTOLIC BLOOD PRESSURE: 133 MMHG | HEART RATE: 66 BPM | DIASTOLIC BLOOD PRESSURE: 66 MMHG | WEIGHT: 161.5 LBS

## 2023-07-20 DIAGNOSIS — Z00.8 NUTRITIONAL ASSESSMENT: Primary | ICD-10-CM

## 2023-07-20 DIAGNOSIS — R68.89 OTHER GENERAL SYMPTOMS AND SIGNS: ICD-10-CM

## 2023-07-20 DIAGNOSIS — C01 SQUAMOUS CELL CARCINOMA OF BASE OF TONGUE (HCC): Primary | ICD-10-CM

## 2023-07-20 DIAGNOSIS — C01 MALIGNANT NEOPLASM OF BASE OF TONGUE (HCC): ICD-10-CM

## 2023-07-20 LAB
ALBUMIN SERPL-MCNC: 3.9 G/DL (ref 3.2–4.6)
ALBUMIN/GLOB SERPL: 1.2 (ref 0.4–1.6)
ALP SERPL-CCNC: 53 U/L (ref 50–136)
ALT SERPL-CCNC: 14 U/L (ref 12–65)
ANION GAP SERPL CALC-SCNC: 5 MMOL/L (ref 2–11)
AST SERPL-CCNC: 15 U/L (ref 15–37)
BASOPHILS # BLD: 0 K/UL (ref 0–0.2)
BASOPHILS NFR BLD: 1 % (ref 0–2)
BILIRUB SERPL-MCNC: 0.7 MG/DL (ref 0.2–1.1)
BUN SERPL-MCNC: 19 MG/DL (ref 8–23)
CALCIUM SERPL-MCNC: 9.4 MG/DL (ref 8.3–10.4)
CHLORIDE SERPL-SCNC: 106 MMOL/L (ref 101–110)
CO2 SERPL-SCNC: 27 MMOL/L (ref 21–32)
CREAT SERPL-MCNC: 1.1 MG/DL (ref 0.8–1.5)
DIFFERENTIAL METHOD BLD: ABNORMAL
EOSINOPHIL # BLD: 0.2 K/UL (ref 0–0.8)
EOSINOPHIL NFR BLD: 5 % (ref 0.5–7.8)
ERYTHROCYTE [DISTWIDTH] IN BLOOD BY AUTOMATED COUNT: 12.2 % (ref 11.9–14.6)
GLOBULIN SER CALC-MCNC: 3.2 G/DL (ref 2.8–4.5)
GLUCOSE SERPL-MCNC: 91 MG/DL (ref 65–100)
HCT VFR BLD AUTO: 35 % (ref 41.1–50.3)
HGB BLD-MCNC: 11.9 G/DL (ref 13.6–17.2)
IMM GRANULOCYTES # BLD AUTO: 0 K/UL (ref 0–0.5)
IMM GRANULOCYTES NFR BLD AUTO: 0 % (ref 0–5)
LYMPHOCYTES # BLD: 0.4 K/UL (ref 0.5–4.6)
LYMPHOCYTES NFR BLD: 13 % (ref 13–44)
MCH RBC QN AUTO: 31.2 PG (ref 26.1–32.9)
MCHC RBC AUTO-ENTMCNC: 34 G/DL (ref 31.4–35)
MCV RBC AUTO: 91.6 FL (ref 82–102)
MONOCYTES # BLD: 0.7 K/UL (ref 0.1–1.3)
MONOCYTES NFR BLD: 21 % (ref 4–12)
NEUTS SEG # BLD: 2 K/UL (ref 1.7–8.2)
NEUTS SEG NFR BLD: 60 % (ref 43–78)
NRBC # BLD: 0 K/UL (ref 0–0.2)
PLATELET # BLD AUTO: 145 K/UL (ref 150–450)
PMV BLD AUTO: 8.1 FL (ref 9.4–12.3)
POTASSIUM SERPL-SCNC: 3.8 MMOL/L (ref 3.5–5.1)
PROT SERPL-MCNC: 7.1 G/DL (ref 6.3–8.2)
RBC # BLD AUTO: 3.82 M/UL (ref 4.23–5.6)
SODIUM SERPL-SCNC: 138 MMOL/L (ref 133–143)
T4 FREE SERPL-MCNC: 1.2 NG/DL (ref 0.78–1.4)
TSH, 3RD GENERATION: 1.81 UIU/ML (ref 0.36–3)
WBC # BLD AUTO: 3.3 K/UL (ref 4.3–11.1)

## 2023-07-20 PROCEDURE — 84439 ASSAY OF FREE THYROXINE: CPT

## 2023-07-20 PROCEDURE — 99213 OFFICE O/P EST LOW 20 MIN: CPT | Performed by: INTERNAL MEDICINE

## 2023-07-20 PROCEDURE — 36415 COLL VENOUS BLD VENIPUNCTURE: CPT

## 2023-07-20 PROCEDURE — 84443 ASSAY THYROID STIM HORMONE: CPT

## 2023-07-20 PROCEDURE — 85025 COMPLETE CBC W/AUTO DIFF WBC: CPT

## 2023-07-20 PROCEDURE — 80053 COMPREHEN METABOLIC PANEL: CPT

## 2023-07-20 PROCEDURE — 1123F ACP DISCUSS/DSCN MKR DOCD: CPT | Performed by: INTERNAL MEDICINE

## 2023-07-20 ASSESSMENT — ANXIETY QUESTIONNAIRES
2. NOT BEING ABLE TO STOP OR CONTROL WORRYING: 0
1. FEELING NERVOUS, ANXIOUS, OR ON EDGE: 0
3. WORRYING TOO MUCH ABOUT DIFFERENT THINGS: 0
5. BEING SO RESTLESS THAT IT IS HARD TO SIT STILL: 0
6. BECOMING EASILY ANNOYED OR IRRITABLE: 0
IF YOU CHECKED OFF ANY PROBLEMS ON THIS QUESTIONNAIRE, HOW DIFFICULT HAVE THESE PROBLEMS MADE IT FOR YOU TO DO YOUR WORK, TAKE CARE OF THINGS AT HOME, OR GET ALONG WITH OTHER PEOPLE: NOT DIFFICULT AT ALL
7. FEELING AFRAID AS IF SOMETHING AWFUL MIGHT HAPPEN: 0
GAD7 TOTAL SCORE: 0
4. TROUBLE RELAXING: 0

## 2023-07-20 ASSESSMENT — PATIENT HEALTH QUESTIONNAIRE - PHQ9
7. TROUBLE CONCENTRATING ON THINGS, SUCH AS READING THE NEWSPAPER OR WATCHING TELEVISION: 0
6. FEELING BAD ABOUT YOURSELF - OR THAT YOU ARE A FAILURE OR HAVE LET YOURSELF OR YOUR FAMILY DOWN: 0
SUM OF ALL RESPONSES TO PHQ QUESTIONS 1-9: 0
5. POOR APPETITE OR OVEREATING: 0
SUM OF ALL RESPONSES TO PHQ QUESTIONS 1-9: 0
SUM OF ALL RESPONSES TO PHQ QUESTIONS 1-9: 0
1. LITTLE INTEREST OR PLEASURE IN DOING THINGS: 0
SUM OF ALL RESPONSES TO PHQ9 QUESTIONS 1 & 2: 0
2. FEELING DOWN, DEPRESSED OR HOPELESS: 0
3. TROUBLE FALLING OR STAYING ASLEEP: 0
8. MOVING OR SPEAKING SO SLOWLY THAT OTHER PEOPLE COULD HAVE NOTICED. OR THE OPPOSITE, BEING SO FIGETY OR RESTLESS THAT YOU HAVE BEEN MOVING AROUND A LOT MORE THAN USUAL: 0
4. FEELING TIRED OR HAVING LITTLE ENERGY: 0
10. IF YOU CHECKED OFF ANY PROBLEMS, HOW DIFFICULT HAVE THESE PROBLEMS MADE IT FOR YOU TO DO YOUR WORK, TAKE CARE OF THINGS AT HOME, OR GET ALONG WITH OTHER PEOPLE: 0
SUM OF ALL RESPONSES TO PHQ QUESTIONS 1-9: 0
9. THOUGHTS THAT YOU WOULD BE BETTER OFF DEAD, OR OF HURTING YOURSELF: 0

## 2023-07-20 NOTE — RESEARCH
Subject seen in clinic to provide gift cards for completion of 12 month visit 7/10/23 on  1805CD HN STAR trial. Subject is doing well. He is being released from oncology care today and will follow up with ENT, Dr. Kathi Davis. Subject has now completed this trial. He was thanked for his participation and wished well.

## 2023-07-20 NOTE — PATIENT INSTRUCTIONS
Patient Instructions from Today's Visit    Reason for Visit:  Follow up for H&N Cancer     Plan: Your CT scan shows no evidence of cancer and only post treatment changes. Your last exam by Dr. Duane Arreola was excellent and you see him again next month. We will discharge you from Medical Oncology at this time as it has been about 2 years from your diagnosis. Continue to follow up with Dr. Duane Arreola as prescribed.       Follow Up:  No further follow up needed     Recent Lab Results:  Hospital Outpatient Visit on 07/20/2023   Component Date Value Ref Range Status    WBC 07/20/2023 3.3 (L)  4.3 - 11.1 K/uL Final    RBC 07/20/2023 3.82 (L)  4.23 - 5.6 M/uL Final    Hemoglobin 07/20/2023 11.9 (L)  13.6 - 17.2 g/dL Final    Hematocrit 07/20/2023 35.0 (L)  41.1 - 50.3 % Final    MCV 07/20/2023 91.6  82.0 - 102.0 FL Final    MCH 07/20/2023 31.2  26.1 - 32.9 PG Final    MCHC 07/20/2023 34.0  31.4 - 35.0 g/dL Final    RDW 07/20/2023 12.2  11.9 - 14.6 % Final    Platelets 42/52/3808 145 (L)  150 - 450 K/uL Final    MPV 07/20/2023 8.1 (L)  9.4 - 12.3 FL Final    nRBC 07/20/2023 0.00  0.0 - 0.2 K/uL Final    **Note: Absolute NRBC parameter is now reported with Hemogram**    Neutrophils % 07/20/2023 60  43 - 78 % Final    Lymphocytes % 07/20/2023 13  13 - 44 % Final    Monocytes % 07/20/2023 21 (H)  4.0 - 12.0 % Final    Eosinophils % 07/20/2023 5  0.5 - 7.8 % Final    Basophils % 07/20/2023 1  0.0 - 2.0 % Final    Immature Granulocytes 07/20/2023 0  0.0 - 5.0 % Final    Neutrophils Absolute 07/20/2023 2.0  1.7 - 8.2 K/UL Final    Lymphocytes Absolute 07/20/2023 0.4 (L)  0.5 - 4.6 K/UL Final    Monocytes Absolute 07/20/2023 0.7  0.1 - 1.3 K/UL Final    Eosinophils Absolute 07/20/2023 0.2  0.0 - 0.8 K/UL Final    Basophils Absolute 07/20/2023 0.0  0.0 - 0.2 K/UL Final    Absolute Immature Granulocyte 07/20/2023 0.0  0.0 - 0.5 K/UL Final    Differential Type 07/20/2023 AUTOMATED    Final         Treatment Summary has been discussed

## 2023-07-21 NOTE — PROGRESS NOTES
Nutrition F/U:  Assessment:  Pt seen during office visit w/ Dr. Dulce Mcwilliams, ~ 2 years out from diagnosis of H&N cancer, CT scan showing SRIKANTH and most recent ENT exam WNL. Pt reports a good appetite/po intake, tolerating a Regular diet, dysgeusia persists but does not impact po intake, mild xerostomia. Pt is very active and walks several miles daily. Pt has completed the STAR H&N survivorship trial and will be discharged from medical oncology today. Nutrition related lab values WNL. Current BW: 161#, stable over the past 3 months. Intervention:  1. Continue w/ Regular diet  2. Continue to follow up with Dr. Riaz Ledesma as prescribed - next appointment (8/18)    Monitoring/Evaluation:  1. Pt will be discharged from medical oncology today and continue to follow up with ENT. RD/Navigator to sign off, but available prn should issues arise in the future.       [address] [place] [address]

## 2023-08-18 ENCOUNTER — OFFICE VISIT (OUTPATIENT)
Dept: ENT CLINIC | Age: 78
End: 2023-08-18

## 2023-08-18 VITALS — WEIGHT: 162 LBS | HEIGHT: 72 IN | BODY MASS INDEX: 21.94 KG/M2 | RESPIRATION RATE: 18 BRPM

## 2023-08-18 DIAGNOSIS — C01 CANCER OF BASE OF TONGUE (HCC): Primary | ICD-10-CM

## 2023-08-18 ASSESSMENT — ENCOUNTER SYMPTOMS
EYE PAIN: 0
COLOR CHANGE: 0
VOMITING: 0
WHEEZING: 0
COUGH: 0
DIARRHEA: 0

## 2023-11-07 ENCOUNTER — OFFICE VISIT (OUTPATIENT)
Dept: FAMILY MEDICINE CLINIC | Facility: CLINIC | Age: 78
End: 2023-11-07
Payer: COMMERCIAL

## 2023-11-07 VITALS
SYSTOLIC BLOOD PRESSURE: 116 MMHG | WEIGHT: 162 LBS | BODY MASS INDEX: 21.94 KG/M2 | HEART RATE: 83 BPM | TEMPERATURE: 97.2 F | OXYGEN SATURATION: 99 % | DIASTOLIC BLOOD PRESSURE: 76 MMHG | HEIGHT: 72 IN

## 2023-11-07 DIAGNOSIS — R63.4 ABNORMAL WEIGHT LOSS: ICD-10-CM

## 2023-11-07 DIAGNOSIS — E03.9 ACQUIRED HYPOTHYROIDISM: ICD-10-CM

## 2023-11-07 DIAGNOSIS — Z12.11 SCREENING FOR MALIGNANT NEOPLASM OF COLON: ICD-10-CM

## 2023-11-07 DIAGNOSIS — G89.29 CHRONIC BILATERAL LOW BACK PAIN WITHOUT SCIATICA: ICD-10-CM

## 2023-11-07 DIAGNOSIS — Z00.00 MEDICARE ANNUAL WELLNESS VISIT, SUBSEQUENT: Primary | ICD-10-CM

## 2023-11-07 DIAGNOSIS — M54.50 CHRONIC BILATERAL LOW BACK PAIN WITHOUT SCIATICA: ICD-10-CM

## 2023-11-07 DIAGNOSIS — C01 SQUAMOUS CELL CARCINOMA OF BASE OF TONGUE (HCC): ICD-10-CM

## 2023-11-07 PROBLEM — N18.2 CKD (CHRONIC KIDNEY DISEASE) STAGE 2, GFR 60-89 ML/MIN: Status: RESOLVED | Noted: 2022-11-04 | Resolved: 2023-11-07

## 2023-11-07 PROCEDURE — 1123F ACP DISCUSS/DSCN MKR DOCD: CPT | Performed by: FAMILY MEDICINE

## 2023-11-07 PROCEDURE — G0439 PPPS, SUBSEQ VISIT: HCPCS | Performed by: FAMILY MEDICINE

## 2023-11-07 PROCEDURE — 99214 OFFICE O/P EST MOD 30 MIN: CPT | Performed by: FAMILY MEDICINE

## 2023-11-07 RX ORDER — LEVOTHYROXINE SODIUM 112 UG/1
112 TABLET ORAL DAILY
Qty: 90 TABLET | Refills: 3 | Status: SHIPPED | OUTPATIENT
Start: 2023-11-07

## 2023-11-07 ASSESSMENT — PATIENT HEALTH QUESTIONNAIRE - PHQ9
SUM OF ALL RESPONSES TO PHQ9 QUESTIONS 1 & 2: 0
1. LITTLE INTEREST OR PLEASURE IN DOING THINGS: 0
SUM OF ALL RESPONSES TO PHQ QUESTIONS 1-9: 0
2. FEELING DOWN, DEPRESSED OR HOPELESS: 0
SUM OF ALL RESPONSES TO PHQ QUESTIONS 1-9: 0

## 2023-11-07 ASSESSMENT — LIFESTYLE VARIABLES
HOW MANY STANDARD DRINKS CONTAINING ALCOHOL DO YOU HAVE ON A TYPICAL DAY: PATIENT DOES NOT DRINK
HOW OFTEN DO YOU HAVE A DRINK CONTAINING ALCOHOL: NEVER

## 2023-11-07 ASSESSMENT — ENCOUNTER SYMPTOMS
WHEEZING: 0
DIARRHEA: 0
RESPIRATORY NEGATIVE: 1
ABDOMINAL PAIN: 0
CONSTIPATION: 0
COUGH: 0
SHORTNESS OF BREATH: 0

## 2023-11-23 LAB — NONINV COLON CA DNA+OCC BLD SCRN STL QL: POSITIVE

## 2023-11-24 DIAGNOSIS — R19.5 POSITIVE COLORECTAL CANCER SCREENING USING COLOGUARD TEST: Primary | ICD-10-CM

## 2023-11-29 ENCOUNTER — OFFICE VISIT (OUTPATIENT)
Dept: ENT CLINIC | Age: 78
End: 2023-11-29
Payer: COMMERCIAL

## 2023-11-29 VITALS
WEIGHT: 166 LBS | DIASTOLIC BLOOD PRESSURE: 78 MMHG | HEIGHT: 72 IN | BODY MASS INDEX: 22.48 KG/M2 | SYSTOLIC BLOOD PRESSURE: 124 MMHG

## 2023-11-29 DIAGNOSIS — C01 CANCER OF BASE OF TONGUE (HCC): Primary | ICD-10-CM

## 2023-11-29 PROCEDURE — 31575 DIAGNOSTIC LARYNGOSCOPY: CPT | Performed by: OTOLARYNGOLOGY

## 2023-11-29 PROCEDURE — 99213 OFFICE O/P EST LOW 20 MIN: CPT | Performed by: OTOLARYNGOLOGY

## 2023-11-29 PROCEDURE — 1123F ACP DISCUSS/DSCN MKR DOCD: CPT | Performed by: OTOLARYNGOLOGY

## 2023-11-29 ASSESSMENT — ENCOUNTER SYMPTOMS
COLOR CHANGE: 0
WHEEZING: 0
EYE PAIN: 0
DIARRHEA: 0
COUGH: 0
VOMITING: 0

## 2023-11-29 NOTE — PROGRESS NOTES
Chief Complaint   Patient presents with    Follow-up     Patient is here for his follow up for tongue base cancer and states that he is doing well. HPI:  Velia Levy is a 70 y.o. male seen in follow-up for his HPV-related R BOT SCCA- he completed chemoRT back on 10/4/21. Last seen back on 23. Doing great since then with no sore throat, oral bleeding, change in swallowing, or hoarseness. He did have a positive Cologuard recently and is getting scheduled for colonoscopy. He denies any other gastrointestinal symptoms. Past Medical History, Past Surgical History, Family history, Social History, and Medications were all reviewed with the patient today and updated as necessary. 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      Past Medical History:   Diagnosis Date    Arthritis     Cancer of base of tongue (HCC)     High cholesterol     Thyroid disease      Social History     Tobacco Use    Smoking status: Former     Types: Cigarettes     Quit date: 1970     Years since quittin.4    Smokeless tobacco: Never   Substance Use Topics    Alcohol use: No     Alcohol/week: 0.0 standard drinks of alcohol     Past Surgical History:   Procedure Laterality Date    COLONOSCOPY  10/26/2015    COLONOSCOPY  10/26/2015    HEENT Right 2021    DL/E w/ bx of R BOT

## 2023-12-08 ENCOUNTER — TELEPHONE (OUTPATIENT)
Dept: GASTROENTEROLOGY | Age: 78
End: 2023-12-08

## 2023-12-08 NOTE — TELEPHONE ENCOUNTER
Pt called to follow up on referral sent from Dr. Surinder Pritchard on 11/24/23 for colonoscopy due to positive cologuard. Please call pt to schedule.      [phone]

## 2023-12-12 ENCOUNTER — TELEPHONE (OUTPATIENT)
Dept: FAMILY MEDICINE CLINIC | Facility: CLINIC | Age: 78
End: 2023-12-12

## 2023-12-12 NOTE — TELEPHONE ENCOUNTER
Pt called, he is unhappy with his gastro referral and that he has not heard back from them. He called the office and was told they are behind. He is interested in going to [facility] over on [address] instead, he has another doctor over that way.

## 2023-12-14 NOTE — TELEPHONE ENCOUNTER
Call to pt, pt still has not heard from GI. Explained to pt that with upcoming holidays and staffing issues this may be the reason for delay. Reviewed with pt that per , referral not urgent. Encourage pt to give it another week or so and if no one has contacted pt, to call office back and we can discuss next steps. Pt was appreciative of the phone call and clarification.

## 2024-01-16 DIAGNOSIS — E03.9 ACQUIRED HYPOTHYROIDISM: ICD-10-CM

## 2024-01-16 RX ORDER — LEVOTHYROXINE SODIUM 112 UG/1
112 TABLET ORAL DAILY
Qty: 90 TABLET | Refills: 3 | OUTPATIENT
Start: 2024-01-16

## 2024-03-29 ENCOUNTER — OFFICE VISIT (OUTPATIENT)
Dept: ENT CLINIC | Age: 79
End: 2024-03-29

## 2024-03-29 VITALS — HEIGHT: 72 IN | BODY MASS INDEX: 23.3 KG/M2 | RESPIRATION RATE: 17 BRPM | WEIGHT: 172 LBS

## 2024-03-29 DIAGNOSIS — H93.90 EAR LESION: Primary | ICD-10-CM

## 2024-03-29 DIAGNOSIS — C01 SQUAMOUS CELL CARCINOMA OF BASE OF TONGUE (HCC): ICD-10-CM

## 2024-03-29 ASSESSMENT — ENCOUNTER SYMPTOMS
SORE THROAT: 0
ABDOMINAL PAIN: 0
SHORTNESS OF BREATH: 0

## 2024-03-29 NOTE — PROGRESS NOTES
Chief Complaint   Patient presents with    Follow-up     Patient is here for his base of tongue cancer follow up and states that he is doing well.       HPI:  Bernard Woody is a 79 y.o. male seen in follow-up for his HPV-related R BOT SCCA- he completed chemoRT back on 10/4/21. Last seen back on 23.  Doing great since then with no new throat symptoms.  He denies any sore throat, dysphagia, odynophagia, oral bleeding or hoarseness.  He continues to complain of a chronic dry mouth as expected.  His only other complaint today is a concerning lesion of his left auricle.  He first noticed this lesion several months ago, and it has slowly enlarged in size.  He denies any localized pain, bleeding, or itching.  He has no previous history of skin malignancy.    Past Medical History, Past Surgical History, Family history, Social History, and Medications were all reviewed with the patient today and updated as necessary.     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     Past Medical History:   Diagnosis Date    Arthritis     Cancer of base of tongue (HCC)     High cholesterol     Thyroid disease      Social History     Tobacco Use    Smoking status: Former     Current packs/day: 0.00     Types: Cigarettes     Quit date: 1970     Years since quittin.7    Smokeless tobacco: Never

## 2024-04-03 ENCOUNTER — TELEPHONE (OUTPATIENT)
Dept: ENT CLINIC | Age: 79
End: 2024-04-03

## 2024-04-03 NOTE — TELEPHONE ENCOUNTER
Called patient with results of recent ear biopsy which unfortunately was positive for at least squamous cell carcinoma in situ.  I recommend proceeding with a left partial auriculectomy.  I reviewed all the risks of surgery and he would like to proceed.  I will have my scheduling staff contact him soon to get him on the OR schedule.

## 2024-04-11 DIAGNOSIS — G89.18 POST-OP PAIN: Primary | ICD-10-CM

## 2024-04-11 RX ORDER — CEPHALEXIN 500 MG/1
500 CAPSULE ORAL 4 TIMES DAILY
Qty: 28 CAPSULE | Refills: 0 | Status: SHIPPED | OUTPATIENT
Start: 2024-04-11

## 2024-04-11 RX ORDER — HYDROCODONE BITARTRATE AND ACETAMINOPHEN 7.5; 325 MG/1; MG/1
1 TABLET ORAL EVERY 6 HOURS PRN
Qty: 12 TABLET | Refills: 0 | Status: SHIPPED | OUTPATIENT
Start: 2024-04-11 | End: 2024-04-14

## 2024-04-11 RX ORDER — ONDANSETRON 4 MG/1
4 TABLET, ORALLY DISINTEGRATING ORAL 3 TIMES DAILY PRN
Qty: 10 TABLET | Refills: 0 | Status: SHIPPED | OUTPATIENT
Start: 2024-04-11

## 2024-04-18 ENCOUNTER — HOSPITAL ENCOUNTER (OUTPATIENT)
Dept: LAB | Age: 79
Discharge: HOME | End: 2024-04-18
Payer: COMMERCIAL

## 2024-04-18 ENCOUNTER — OUTSIDE SERVICES (OUTPATIENT)
Dept: ENT CLINIC | Age: 79
End: 2024-04-18

## 2024-04-18 DIAGNOSIS — C44.229 SQUAMOUS CELL CARCINOMA OF AURICLE OF EAR, LEFT: Primary | ICD-10-CM

## 2024-04-18 PROCEDURE — 88305 TISSUE EXAM BY PATHOLOGIST: CPT

## 2024-04-18 NOTE — PROGRESS NOTES
[facility] Operative Note    Patient: Bernard Woody-[id number]    Pre-op diagnosis: Squamous cell carcinoma of left auricle    Post-op diagnosis: Squamous cell carcinoma of left auricle    Procedure: Left partial auriculectomy with primary closure    Operative Surgeon: Sean Kaur MD    Anesthesia: Monitored anesthesia care    Anesthesiologist: Wilmer Clayton MD    Operative findings: There was a 7-8 mm raised lesion along the crura of the left auricle.  Left partial auriculectomy was performed as a wedge excision and the defect was able to be closed primarily.  5 mm surgical margins were taken.    IV fluid: 200 cc    Estimated blood loss: 5 cc    Drains: None    Specimens: Left ear lesion-permanent    Complications: None    Disposition: PACU then home    Condition: Stable    Brief history: Mr. Woody is a 79-year-old male with a history of a base of tongue squamous cell carcinoma.  I have been following him for routine cancer surveillance and noted a concerning lesion of his left external ear.  Biopsy was performed in the office which was concerning for squamous cell carcinoma.  The decision was made to take him to the operating room for left partial auriculectomy with closure.    Description of procedure: The patient was brought back to the operating room and placed on the table in supine position.  Monitored anesthesia care was inducted without any complications.  Once the patient was adequately sedated, I injected a total of 2 cc of 1% lidocaine with 1-100,000 epinephrine along the planned incision site.  He was then sterilely prepped and draped in usual fashion.  I began by designing 5 mm margins around this raised lesion of the left crura.  I then designed a wedge, utilizing these 5 mm margins in all directions.  I incised through the skin and cartilage of the ear using a 15 blade.  The specimen was able to be removed en bloc as a wedge excision.  Hemostasis was obtained using Bovie electrocautery.  I

## 2024-04-26 ENCOUNTER — OFFICE VISIT (OUTPATIENT)
Dept: ENT CLINIC | Age: 79
End: 2024-04-26

## 2024-04-26 DIAGNOSIS — C44.229 SQUAMOUS CELL CARCINOMA OF AURICLE OF LEFT EAR: Primary | ICD-10-CM

## 2024-04-26 PROCEDURE — 99024 POSTOP FOLLOW-UP VISIT: CPT | Performed by: OTOLARYNGOLOGY

## 2024-04-26 NOTE — PROGRESS NOTES
Bernard Woody is a 79 y.o. male seen today now 1 week post-op after undergoing left partial auriculectomy back on 4/18/2024.  Doing great since then with minimal pain and no bleeding or drainage from the incision line.  He has been keeping ointment over the incision as instructed.  No other new complaints.    -Left auricle with well-healing incision line.  Mild crusting debrided.  Residual fast gut suture was trimmed.    PATH:  DIAGNOSIS       A:  \" LEFT EAR LESION\":         WELL DIFFERENTIATED SQUAMOUS CARCINOMA. MARKED DEEP AND EPITHELIAL   MARGINS OF RESECTION IN PLANES OF SECTION ARE NEGATIVE FOR MALIGNANT TUMOR     A/P:   Diagnosis Orders   1. 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  The pathology did confirm well-differentiated SCCA, but all the margins were clear.  He will keep ointment over the incision as instructed but may start to shower and bathe normally.  He has follow-up later this summer for routine cancer surveillance.    Sean Kaur MD

## 2024-05-07 ENCOUNTER — OFFICE VISIT (OUTPATIENT)
Dept: FAMILY MEDICINE CLINIC | Facility: CLINIC | Age: 79
End: 2024-05-07
Payer: COMMERCIAL

## 2024-05-07 VITALS
BODY MASS INDEX: 23.03 KG/M2 | OXYGEN SATURATION: 97 % | HEIGHT: 72 IN | DIASTOLIC BLOOD PRESSURE: 70 MMHG | WEIGHT: 170 LBS | SYSTOLIC BLOOD PRESSURE: 124 MMHG | TEMPERATURE: 98.1 F | HEART RATE: 99 BPM

## 2024-05-07 DIAGNOSIS — Z00.00 MEDICARE ANNUAL WELLNESS VISIT, SUBSEQUENT: ICD-10-CM

## 2024-05-07 DIAGNOSIS — Z12.5 ENCOUNTER FOR PROSTATE CANCER SCREENING: ICD-10-CM

## 2024-05-07 DIAGNOSIS — E03.9 ACQUIRED HYPOTHYROIDISM: ICD-10-CM

## 2024-05-07 DIAGNOSIS — M19.90 ARTHRITIS: ICD-10-CM

## 2024-05-07 DIAGNOSIS — D64.9 NORMOCYTIC ANEMIA: ICD-10-CM

## 2024-05-07 DIAGNOSIS — E78.2 MIXED HYPERLIPIDEMIA: ICD-10-CM

## 2024-05-07 DIAGNOSIS — Z00.00 MEDICARE ANNUAL WELLNESS VISIT, SUBSEQUENT: Primary | ICD-10-CM

## 2024-05-07 DIAGNOSIS — C01 SQUAMOUS CELL CARCINOMA OF BASE OF TONGUE (HCC): ICD-10-CM

## 2024-05-07 LAB
ALBUMIN SERPL-MCNC: 4.3 G/DL (ref 3.2–4.6)
ALBUMIN/GLOB SERPL: 1.5 (ref 1–1.9)
ALP SERPL-CCNC: 71 U/L (ref 40–129)
ALT SERPL-CCNC: 10 U/L (ref 12–65)
ANION GAP SERPL CALC-SCNC: 12 MMOL/L (ref 9–18)
AST SERPL-CCNC: 22 U/L (ref 15–37)
BASOPHILS # BLD: 0 K/UL (ref 0–0.2)
BASOPHILS NFR BLD: 1 % (ref 0–2)
BILIRUB SERPL-MCNC: 0.4 MG/DL (ref 0–1.2)
BUN SERPL-MCNC: 15 MG/DL (ref 8–23)
CALCIUM SERPL-MCNC: 9.9 MG/DL (ref 8.8–10.2)
CHLORIDE SERPL-SCNC: 104 MMOL/L (ref 98–107)
CHOLEST SERPL-MCNC: 215 MG/DL (ref 0–200)
CO2 SERPL-SCNC: 27 MMOL/L (ref 20–28)
CREAT SERPL-MCNC: 1.08 MG/DL (ref 0.8–1.3)
DIFFERENTIAL METHOD BLD: ABNORMAL
EOSINOPHIL # BLD: 0.2 K/UL (ref 0–0.8)
EOSINOPHIL NFR BLD: 5 % (ref 0.5–7.8)
ERYTHROCYTE [DISTWIDTH] IN BLOOD BY AUTOMATED COUNT: 12.8 % (ref 11.9–14.6)
GLOBULIN SER CALC-MCNC: 2.9 G/DL (ref 2.3–3.5)
GLUCOSE SERPL-MCNC: 79 MG/DL (ref 70–99)
HCT VFR BLD AUTO: 38.2 % (ref 41.1–50.3)
HDLC SERPL-MCNC: 60 MG/DL (ref 40–60)
HDLC SERPL: 3.6 (ref 0–5)
HGB BLD-MCNC: 12.7 G/DL (ref 13.6–17.2)
IMM GRANULOCYTES # BLD AUTO: 0 K/UL (ref 0–0.5)
IMM GRANULOCYTES NFR BLD AUTO: 0 % (ref 0–5)
LDLC SERPL CALC-MCNC: 134 MG/DL (ref 0–100)
LYMPHOCYTES # BLD: 0.6 K/UL (ref 0.5–4.6)
LYMPHOCYTES NFR BLD: 14 % (ref 13–44)
MCH RBC QN AUTO: 30.4 PG (ref 26.1–32.9)
MCHC RBC AUTO-ENTMCNC: 33.2 G/DL (ref 31.4–35)
MCV RBC AUTO: 91.4 FL (ref 82–102)
MONOCYTES # BLD: 0.8 K/UL (ref 0.1–1.3)
MONOCYTES NFR BLD: 19 % (ref 4–12)
NEUTS SEG # BLD: 2.4 K/UL (ref 1.7–8.2)
NEUTS SEG NFR BLD: 61 % (ref 43–78)
NRBC # BLD: 0 K/UL (ref 0–0.2)
PLATELET # BLD AUTO: 189 K/UL (ref 150–450)
PMV BLD AUTO: 8.8 FL (ref 9.4–12.3)
POTASSIUM SERPL-SCNC: 4.2 MMOL/L (ref 3.5–5.1)
PROT SERPL-MCNC: 7.2 G/DL (ref 6.3–8.2)
PSA SERPL-MCNC: 1.2 NG/ML (ref 0–4)
RBC # BLD AUTO: 4.18 M/UL (ref 4.23–5.6)
SODIUM SERPL-SCNC: 143 MMOL/L (ref 136–145)
T4 FREE SERPL-MCNC: 1.5 NG/DL (ref 0.9–1.7)
TRIGL SERPL-MCNC: 106 MG/DL (ref 0–150)
TSH, 3RD GENERATION: 1.14 UIU/ML (ref 0.27–4.2)
VLDLC SERPL CALC-MCNC: 21 MG/DL (ref 6–23)
WBC # BLD AUTO: 4 K/UL (ref 4.3–11.1)

## 2024-05-07 PROCEDURE — 1123F ACP DISCUSS/DSCN MKR DOCD: CPT | Performed by: FAMILY MEDICINE

## 2024-05-07 PROCEDURE — 99214 OFFICE O/P EST MOD 30 MIN: CPT | Performed by: FAMILY MEDICINE

## 2024-05-07 PROCEDURE — G0439 PPPS, SUBSEQ VISIT: HCPCS | Performed by: FAMILY MEDICINE

## 2024-05-07 SDOH — ECONOMIC STABILITY: INCOME INSECURITY: HOW HARD IS IT FOR YOU TO PAY FOR THE VERY BASICS LIKE FOOD, HOUSING, MEDICAL CARE, AND HEATING?: NOT HARD AT ALL

## 2024-05-07 SDOH — ECONOMIC STABILITY: FOOD INSECURITY: WITHIN THE PAST 12 MONTHS, YOU WORRIED THAT YOUR FOOD WOULD RUN OUT BEFORE YOU GOT MONEY TO BUY MORE.: NEVER TRUE

## 2024-05-07 SDOH — ECONOMIC STABILITY: FOOD INSECURITY: WITHIN THE PAST 12 MONTHS, THE FOOD YOU BOUGHT JUST DIDN'T LAST AND YOU DIDN'T HAVE MONEY TO GET MORE.: NEVER TRUE

## 2024-05-07 ASSESSMENT — PATIENT HEALTH QUESTIONNAIRE - PHQ9
1. LITTLE INTEREST OR PLEASURE IN DOING THINGS: NOT AT ALL
SUM OF ALL RESPONSES TO PHQ9 QUESTIONS 1 & 2: 0
SUM OF ALL RESPONSES TO PHQ QUESTIONS 1-9: 0
2. FEELING DOWN, DEPRESSED OR HOPELESS: NOT AT ALL
SUM OF ALL RESPONSES TO PHQ QUESTIONS 1-9: 0

## 2024-05-07 ASSESSMENT — LIFESTYLE VARIABLES
HOW OFTEN DO YOU HAVE A DRINK CONTAINING ALCOHOL: NEVER
HOW MANY STANDARD DRINKS CONTAINING ALCOHOL DO YOU HAVE ON A TYPICAL DAY: PATIENT DOES NOT DRINK

## 2024-05-07 ASSESSMENT — ENCOUNTER SYMPTOMS
COUGH: 0
BACK PAIN: 1
BLOOD IN STOOL: 0
WHEEZING: 0
RESPIRATORY NEGATIVE: 1
DIARRHEA: 0
SHORTNESS OF BREATH: 0
CONSTIPATION: 0

## 2024-05-07 NOTE — PATIENT INSTRUCTIONS
Learning About Being Active as an Older Adult  Why is being active important as you get older?     Being active is one of the best things you can do for your health. And it's never too late to start. Being active--or getting active, if you aren't already--has definite benefits. It can:  Give you more energy,  Keep your mind sharp.  Improve balance to reduce your risk of falls.  Help you manage chronic illness with fewer medicines.  No matter how old you are, how fit you are, or what health problems you have, there is a form of activity that will work for you. And the more physical activity you can do, the better your overall health will be.  What kinds of activity can help you stay healthy?  Being more active will make your daily activities easier. Physical activity includes planned exercise and things you do in daily life. There are four types of activity:  Aerobic.  Doing aerobic activity makes your heart and lungs strong.  Includes walking, dancing, and gardening.  Aim for at least 2½ hours spread throughout the week.  It improves your energy and can help you sleep better.  Muscle-strengthening.  This type of activity can help maintain muscle and strengthen bones.  Includes climbing stairs, using resistance bands, and lifting or carrying heavy loads.  Aim for at least twice a week.  It can help protect the knees and other joints.  Stretching.  Stretching gives you better range of motion in joints and muscles.  Includes upper arm stretches, calf stretches, and gentle yoga.  Aim for at least twice a week, preferably after your muscles are warmed up from other activities.  It can help you function better in daily life.  Balancing.  This helps you stay coordinated and have good posture.  Includes heel-to-toe walking, vinicio chi, and certain types of yoga.  Aim for at least 3 days a week.  It can reduce your risk of falling.  Even if you have a hard time meeting the recommendations, it's better to be more active

## 2024-05-07 NOTE — PROGRESS NOTES
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  He has been cancer free.  Last seen by ENT in April.    Arthritis-taking Celebrex 200 mg daily.  Stable.    Acquired hypothyroidism- on levothyroxine 112 mcg daily.  Continue current dose and check labs today.  -     T4, Free; Future  -     TSH; Future    Normocytic anemia- check cbc today. Last h/h 11.9/36 in July 2023.  Colonoscopy 2/2024 -normal.   -     CBC with Auto Differential  -     Comprehensive Metabolic Panel; Future    Mixed hyperlipidemia- - check labs today  . No meds.   -     Comprehensive Metabolic Panel; Future  -     Lipid Panel; Future    Encounter for prostate cancer screening  -     PSA Screening; Future         Return in 6 months (on 11/7/2024) for 6 mos f/u.         Paty Mcdaniel MD  Medicare Annual Wellness Visit    Bernard Woody is here for Medicare AWV    Assessment & Plan   Medicare annual wellness visit, subsequent  -     Comprehensive Metabolic Panel; Future  Squamous cell carcinoma of base of tongue (HCC)  Arthritis  Acquired hypothyroidism  -     T4, Free; Future  -     TSH; Future  Normocytic anemia  -     CBC with Auto Differential  -     Comprehensive Metabolic Panel; Future  Mixed hyperlipidemia  -     Comprehensive Metabolic Panel; Future  -     Lipid Panel; Future  Encounter for prostate cancer screening  -     PSA Screening; Future    Recommendations for Preventive Services Due: see orders and patient instructions/AVS.  Recommended screening schedule for the next 5-10 years is provided to the patient in written form: see Patient Instructions/AVS.     Return in 6 months (on 11/7/2024) for 6 mos f/u.     Subjective       Patient's complete Health Risk Assessment and screening values have been reviewed and are found in Flowsheets. The following problems were reviewed today and where indicated follow up appointments were made and/or referrals ordered.    Positive

## 2024-05-08 DIAGNOSIS — D64.9 ANEMIA, UNSPECIFIED TYPE: Primary | ICD-10-CM

## 2024-05-13 ENCOUNTER — TELEPHONE (OUTPATIENT)
Dept: FAMILY MEDICINE CLINIC | Facility: CLINIC | Age: 79
End: 2024-05-13

## 2024-05-13 NOTE — TELEPHONE ENCOUNTER
----- Message from Paty Mcdaniel MD sent at 5/12/2024  9:39 PM EDT -----  Please try and reach pt to have him come in for labs  - needs appt to f/u on anemia. Not sure Taylor left you a message about this pt.to f/u.

## 2024-05-30 ENCOUNTER — NURSE ONLY (OUTPATIENT)
Dept: FAMILY MEDICINE CLINIC | Facility: CLINIC | Age: 79
End: 2024-05-30

## 2024-05-30 DIAGNOSIS — D64.9 ANEMIA, UNSPECIFIED TYPE: ICD-10-CM

## 2024-05-30 LAB
BASOPHILS # BLD: 0 K/UL (ref 0–0.2)
BASOPHILS NFR BLD: 1 % (ref 0–2)
DIFFERENTIAL METHOD BLD: ABNORMAL
EOSINOPHIL # BLD: 0.2 K/UL (ref 0–0.8)
EOSINOPHIL NFR BLD: 5 % (ref 0.5–7.8)
ERYTHROCYTE [DISTWIDTH] IN BLOOD BY AUTOMATED COUNT: 12.2 % (ref 11.9–14.6)
FERRITIN SERPL-MCNC: 129 NG/ML (ref 8–388)
FOLATE SERPL-MCNC: 18.1 NG/ML (ref 3.1–17.5)
HCT VFR BLD AUTO: 37.6 % (ref 41.1–50.3)
HGB BLD-MCNC: 12.6 G/DL (ref 13.6–17.2)
IMM GRANULOCYTES # BLD AUTO: 0 K/UL (ref 0–0.5)
IMM GRANULOCYTES NFR BLD AUTO: 0 % (ref 0–5)
IRON SATN MFR SERPL: 46 % (ref 20–50)
IRON SERPL-MCNC: 128 UG/DL (ref 35–100)
LYMPHOCYTES # BLD: 0.5 K/UL (ref 0.5–4.6)
LYMPHOCYTES NFR BLD: 13 % (ref 13–44)
MCH RBC QN AUTO: 30.4 PG (ref 26.1–32.9)
MCHC RBC AUTO-ENTMCNC: 33.5 G/DL (ref 31.4–35)
MCV RBC AUTO: 90.8 FL (ref 82–102)
MONOCYTES # BLD: 0.6 K/UL (ref 0.1–1.3)
MONOCYTES NFR BLD: 16 % (ref 4–12)
NEUTS SEG # BLD: 2.3 K/UL (ref 1.7–8.2)
NEUTS SEG NFR BLD: 65 % (ref 43–78)
NRBC # BLD: 0 K/UL (ref 0–0.2)
PLATELET # BLD AUTO: 183 K/UL (ref 150–450)
PMV BLD AUTO: 8.9 FL (ref 9.4–12.3)
RBC # BLD AUTO: 4.14 M/UL (ref 4.23–5.6)
TIBC SERPL-MCNC: 276 UG/DL (ref 240–450)
UIBC SERPL-MCNC: 148 UG/DL (ref 112–347)
VIT B12 SERPL-MCNC: 520 PG/ML (ref 193–986)
WBC # BLD AUTO: 3.5 K/UL (ref 4.3–11.1)

## 2024-06-07 ENCOUNTER — TELEPHONE (OUTPATIENT)
Dept: ONCOLOGY | Age: 79
End: 2024-06-07

## 2024-06-07 NOTE — TELEPHONE ENCOUNTER
Physician provider: Dr. Brandt  Reason for today's call (Please detail here patients chief complaint): Dental Implants  Last office visit:07/202023  Preferred pharmacy (If refill request): n/a  Calls to office within the last 48 hours?:No    Patient notified that their information will be routed to the [facility] clinical triage team for review. Patient is advised that they will receive a phone call from the triage department. If symptom related and symptoms worsen before receiving a call back, the patient has been advised to proceed to the nearest ED.     Dr Nury Talavera called stating that she is a periodontist. She states he is interested in implants and is wanting to know how much radiation his jaw took and if he would be eligible for implants due to his radiation treatment. She can be reached on her cell at [phone].

## 2024-08-02 ENCOUNTER — OFFICE VISIT (OUTPATIENT)
Dept: ENT CLINIC | Age: 79
End: 2024-08-02

## 2024-08-02 VITALS — RESPIRATION RATE: 16 BRPM | BODY MASS INDEX: 22.65 KG/M2 | HEIGHT: 72 IN | WEIGHT: 167.2 LBS

## 2024-08-02 DIAGNOSIS — C01 CANCER OF BASE OF TONGUE (HCC): Primary | ICD-10-CM

## 2024-08-02 ASSESSMENT — ENCOUNTER SYMPTOMS
GASTROINTESTINAL NEGATIVE: 1
RESPIRATORY NEGATIVE: 1
ALLERGIC/IMMUNOLOGIC NEGATIVE: 1
EYES NEGATIVE: 1

## 2024-08-02 NOTE — PROGRESS NOTES
Chief Complaint   Patient presents with    Follow-up     CA check        HPI:  Bernard Woody is a 79 y.o. male seen in follow-up for routine cancer surveillance.  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  He has had some pain and irritation along the excision site, and he is concerned there may be a retained suture.  There has been no bleeding or drainage at all.  From a throat standpoint, he has been doing well with no sore throat, change in swallowing, hemoptysis, or hoarseness.  No other new complaints.    Past Medical History, Past Surgical History, Family history, Social History, and Medications were all reviewed with the patient today and updated as necessary.     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)    Pure hypercholesterolemia    Pancytopenia due to antineoplastic chemotherapy (HCC)    Acute deep vein thrombosis (DVT) of right upper extremity, unspecified vein (HCC)    Acquired hypothyroidism    Normocytic anemia     Current Outpatient Medications   Medication Sig    cephALEXin (KEFLEX) 500 MG capsule Take 1 capsule by mouth 4 times daily    ondansetron (ZOFRAN-ODT) 4 MG disintegrating tablet Take 1 tablet by mouth 3 times daily as needed for Nausea or Vomiting    levothyroxine (SYNTHROID) 112 MCG tablet Take 1 tablet by mouth daily    Acetaminophen (TYLENOL) 325 MG CAPS Take 650 mg by mouth in the morning and at bedtime arthritis    celecoxib (CELEBREX) 200 MG capsule Take 1 capsule by mouth daily (Patient taking differently: Take 1 capsule by mouth as needed)     No current facility-administered medications for this visit.     Past Medical History:   Diagnosis Date    Arthritis     Cancer of base of tongue (HCC)     High cholesterol     Squamous cell

## 2024-09-20 ENCOUNTER — OFFICE VISIT (OUTPATIENT)
Dept: ENT CLINIC | Age: 79
End: 2024-09-20
Payer: COMMERCIAL

## 2024-09-20 VITALS — RESPIRATION RATE: 10 BRPM | WEIGHT: 161.2 LBS | HEIGHT: 72 IN | BODY MASS INDEX: 21.83 KG/M2

## 2024-09-20 DIAGNOSIS — C01 CANCER OF BASE OF TONGUE (HCC): Primary | ICD-10-CM

## 2024-09-20 DIAGNOSIS — H93.90 EAR LESION: ICD-10-CM

## 2024-09-20 PROCEDURE — 99213 OFFICE O/P EST LOW 20 MIN: CPT | Performed by: OTOLARYNGOLOGY

## 2024-09-20 PROCEDURE — 1123F ACP DISCUSS/DSCN MKR DOCD: CPT | Performed by: OTOLARYNGOLOGY

## 2024-09-20 RX ORDER — CEPHALEXIN 500 MG/1
500 CAPSULE ORAL 2 TIMES DAILY
Qty: 14 CAPSULE | Refills: 0 | Status: SHIPPED | OUTPATIENT
Start: 2024-09-20 | End: 2024-09-27

## 2024-09-20 ASSESSMENT — ENCOUNTER SYMPTOMS
GASTROINTESTINAL NEGATIVE: 1
ALLERGIC/IMMUNOLOGIC NEGATIVE: 1
RESPIRATORY NEGATIVE: 1
EYES NEGATIVE: 1

## 2024-10-21 ENCOUNTER — OFFICE VISIT (OUTPATIENT)
Dept: ENT CLINIC | Age: 79
End: 2024-10-21
Payer: COMMERCIAL

## 2024-10-21 VITALS — BODY MASS INDEX: 21.83 KG/M2 | RESPIRATION RATE: 12 BRPM | WEIGHT: 161.16 LBS | HEIGHT: 72 IN

## 2024-10-21 DIAGNOSIS — H93.90 EAR LESION: Primary | ICD-10-CM

## 2024-10-21 PROCEDURE — 1123F ACP DISCUSS/DSCN MKR DOCD: CPT | Performed by: OTOLARYNGOLOGY

## 2024-10-21 PROCEDURE — 99213 OFFICE O/P EST LOW 20 MIN: CPT | Performed by: OTOLARYNGOLOGY

## 2024-10-21 ASSESSMENT — ENCOUNTER SYMPTOMS
RESPIRATORY NEGATIVE: 1
EYES NEGATIVE: 1
ALLERGIC/IMMUNOLOGIC NEGATIVE: 1
GASTROINTESTINAL NEGATIVE: 1

## 2024-10-21 NOTE — PROGRESS NOTES
Chief Complaint   Patient presents with    Follow-up     2 week ear incision check        HPI:  Bernard Woody is a 79 y.o. male seen in follow-up to check on his ear. 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  He was recently noted to have swelling and pain along the lateral aspect of the left auricle last month.  I had seen him last on 9/20/2024 and started him on Keflex.  He reports significant improvement in his ear symptoms since then, and the pain and swelling has fully resolved.  He has been doing well from a throat standpoint with no new throat complaints.    Past Medical History, Past Surgical History, Family history, Social History, and Medications were all reviewed with the patient today and updated as necessary.     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)    Pure hypercholesterolemia    Pancytopenia due to antineoplastic chemotherapy (HCC)    Acute deep vein thrombosis (DVT) of right upper extremity, unspecified vein (HCC)    Acquired hypothyroidism    Normocytic anemia     Current Outpatient Medications   Medication Sig    levothyroxine (SYNTHROID) 112 MCG tablet Take 1 tablet by mouth daily    Acetaminophen (TYLENOL) 325 MG CAPS Take 650 mg by mouth in the morning and at bedtime arthritis    celecoxib (CELEBREX) 200 MG capsule Take 1 capsule by mouth daily (Patient taking differently: Take 1 capsule by mouth as needed)    ondansetron (ZOFRAN-ODT) 4 MG disintegrating tablet Take 1 tablet by mouth 3 times daily as needed for Nausea or Vomiting     No current facility-administered medications for this visit.     Past Medical History:   Diagnosis Date    Arthritis     Cancer of base of tongue (HCC)     High cholesterol     Squamous cell carcinoma of auricle of

## 2024-11-07 ENCOUNTER — OFFICE VISIT (OUTPATIENT)
Dept: FAMILY MEDICINE CLINIC | Facility: CLINIC | Age: 79
End: 2024-11-07

## 2024-11-07 VITALS
HEIGHT: 72 IN | SYSTOLIC BLOOD PRESSURE: 110 MMHG | TEMPERATURE: 97 F | WEIGHT: 160 LBS | OXYGEN SATURATION: 99 % | HEART RATE: 92 BPM | BODY MASS INDEX: 21.67 KG/M2 | DIASTOLIC BLOOD PRESSURE: 70 MMHG

## 2024-11-07 DIAGNOSIS — E78.2 MIXED HYPERLIPIDEMIA: ICD-10-CM

## 2024-11-07 DIAGNOSIS — D64.9 ANEMIA, UNSPECIFIED TYPE: ICD-10-CM

## 2024-11-07 DIAGNOSIS — E03.9 ACQUIRED HYPOTHYROIDISM: ICD-10-CM

## 2024-11-07 DIAGNOSIS — Z85.819 HISTORY OF THROAT CANCER: Primary | ICD-10-CM

## 2024-11-07 PROBLEM — C01 SQUAMOUS CELL CARCINOMA OF BASE OF TONGUE (HCC): Status: RESOLVED | Noted: 2021-08-02 | Resolved: 2024-11-07

## 2024-11-07 RX ORDER — LEVOTHYROXINE SODIUM 100 UG/1
100 TABLET ORAL DAILY
Qty: 90 TABLET | Refills: 3 | Status: SHIPPED | OUTPATIENT
Start: 2024-11-07

## 2024-11-07 ASSESSMENT — ENCOUNTER SYMPTOMS
SHORTNESS OF BREATH: 0
DIARRHEA: 0
BACK PAIN: 0
WHEEZING: 0
CONSTIPATION: 0
ABDOMINAL PAIN: 0
CHEST TIGHTNESS: 0
COUGH: 0

## 2024-11-07 NOTE — PATIENT INSTRUCTIONS
[facility]   Re[facility] - they can order them.

## 2024-11-07 NOTE — PROGRESS NOTES
Normocephalic and atraumatic.      Right Ear: Tympanic membrane normal.      Left Ear: Tympanic membrane normal.      Nose: Nose normal.   Eyes:      Pupils: Pupils are equal, round, and reactive to light.   Neck:      Vascular: No carotid bruit.   Cardiovascular:      Rate and Rhythm: Normal rate and regular rhythm.      Heart sounds: Normal heart sounds.   Pulmonary:      Effort: Pulmonary effort is normal.      Breath sounds: Normal breath sounds.   Abdominal:      General: Abdomen is flat. Bowel sounds are normal.      Palpations: Abdomen is soft.   Musculoskeletal:         General: No swelling. Normal range of motion.      Cervical back: Normal range of motion and neck supple.   Lymphadenopathy:      Cervical: No cervical adenopathy.   Skin:     General: Skin is warm and dry.   Neurological:      General: No focal deficit present.      Mental Status: He is alert. Mental status is at baseline.   Psychiatric:         Mood and Affect: Mood normal.       Lab Results   Component Value Date    CHOL 215 (H) 05/07/2024    TRIG 106 05/07/2024    HDL 60 05/07/2024     (H) 05/07/2024    VLDL 21 05/07/2024    CHOLHDLRATIO 3.6 05/07/2024      Lab Results   Component Value Date     05/07/2024    K 4.2 05/07/2024     05/07/2024    CO2 27 05/07/2024    BUN 15 05/07/2024    CREATININE 1.08 05/07/2024    GLUCOSE 79 05/07/2024    CALCIUM 9.9 05/07/2024    BILITOT 0.4 05/07/2024    ALKPHOS 71 05/07/2024    AST 22 05/07/2024    ALT 10 (L) 05/07/2024    LABGLOM 71 05/07/2024    GFRAA >60 07/07/2022    AGRATIO 1.0 (L) 04/01/2022    GLOB 2.9 05/07/2024        Lab Results   Component Value Date    WBC 3.5 (L) 05/30/2024    HGB 12.6 (L) 05/30/2024    HCT 37.6 (L) 05/30/2024    MCV 90.8 05/30/2024     05/30/2024          ASSESSMENT & PLAN      I have reviewed the patient's past medical history, social history and family history and vitals.  We have discussed treatment plan and follow up and given patient

## 2024-12-06 ENCOUNTER — OFFICE VISIT (OUTPATIENT)
Dept: ENT CLINIC | Age: 79
End: 2024-12-06

## 2024-12-06 VITALS — BODY MASS INDEX: 21.68 KG/M2 | WEIGHT: 160.05 LBS | HEIGHT: 72 IN

## 2024-12-06 DIAGNOSIS — C01 SQUAMOUS CELL CARCINOMA OF BASE OF TONGUE (HCC): Primary | ICD-10-CM

## 2024-12-06 ASSESSMENT — ENCOUNTER SYMPTOMS
GASTROINTESTINAL NEGATIVE: 1
ALLERGIC/IMMUNOLOGIC NEGATIVE: 1
EYES NEGATIVE: 1
RESPIRATORY NEGATIVE: 1

## 2024-12-06 NOTE — PROGRESS NOTES
Chief Complaint   Patient presents with    Follow-up     4mos CA check        HPI:  Bernard Woody is a 79 y.o. male seen in follow-up for routine cancer surveillance. 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  He did have some irritation at the ear excision site earlier this fall which resolved after treatment with oral antibiotics.  Last seen back on 10/21/2024.  Doing great since then with no further ear irritation or drainage.  He did report some dysphagia after eating Chinese food about 1 week ago.  His swallowing is otherwise remained stable, and he denies any sore throat or voice changes, or hemoptysis.    Past Medical History, Past Surgical History, Family history, Social History, and Medications were all reviewed with the patient today and updated as necessary.     No Known Allergies  Patient Active Problem List   Diagnosis    Low back pain    Pure hypercholesterolemia    Pancytopenia due to antineoplastic chemotherapy (HCC)    Acute deep vein thrombosis (DVT) of right upper extremity, unspecified vein (HCC)    Acquired hypothyroidism    Normocytic anemia     Current Outpatient Medications   Medication Sig    levothyroxine (SYNTHROID) 100 MCG tablet Take 1 tablet by mouth daily    Acetaminophen (TYLENOL) 325 MG CAPS Take 650 mg by mouth in the morning and at bedtime arthritis    celecoxib (CELEBREX) 200 MG capsule Take 1 capsule by mouth daily (Patient taking differently: Take 1 capsule by mouth as needed)     No current facility-administered medications for this visit.     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

## 2025-01-03 DIAGNOSIS — E03.9 ACQUIRED HYPOTHYROIDISM: ICD-10-CM

## 2025-01-03 RX ORDER — LEVOTHYROXINE SODIUM 112 UG/1
112 TABLET ORAL DAILY
Qty: 90 TABLET | Refills: 3 | OUTPATIENT
Start: 2025-01-03

## 2025-03-04 ENCOUNTER — TELEPHONE (OUTPATIENT)
Dept: FAMILY MEDICINE CLINIC | Facility: CLINIC | Age: 80
End: 2025-03-04

## 2025-03-04 NOTE — TELEPHONE ENCOUNTER
Need vein work done on legs.  Needs a referral to [facility] [place]. Insurance changed.

## 2025-03-06 SDOH — ECONOMIC STABILITY: INCOME INSECURITY: IN THE LAST 12 MONTHS, WAS THERE A TIME WHEN YOU WERE NOT ABLE TO PAY THE MORTGAGE OR RENT ON TIME?: PATIENT DECLINED

## 2025-03-06 SDOH — ECONOMIC STABILITY: FOOD INSECURITY: WITHIN THE PAST 12 MONTHS, YOU WORRIED THAT YOUR FOOD WOULD RUN OUT BEFORE YOU GOT MONEY TO BUY MORE.: NEVER TRUE

## 2025-03-06 SDOH — ECONOMIC STABILITY: FOOD INSECURITY: WITHIN THE PAST 12 MONTHS, THE FOOD YOU BOUGHT JUST DIDN'T LAST AND YOU DIDN'T HAVE MONEY TO GET MORE.: NEVER TRUE

## 2025-03-06 SDOH — ECONOMIC STABILITY: TRANSPORTATION INSECURITY
IN THE PAST 12 MONTHS, HAS THE LACK OF TRANSPORTATION KEPT YOU FROM MEDICAL APPOINTMENTS OR FROM GETTING MEDICATIONS?: NO

## 2025-03-06 SDOH — ECONOMIC STABILITY: TRANSPORTATION INSECURITY
IN THE PAST 12 MONTHS, HAS LACK OF TRANSPORTATION KEPT YOU FROM MEETINGS, WORK, OR FROM GETTING THINGS NEEDED FOR DAILY LIVING?: NO

## 2025-03-06 ASSESSMENT — PATIENT HEALTH QUESTIONNAIRE - PHQ9
SUM OF ALL RESPONSES TO PHQ QUESTIONS 1-9: 0
SUM OF ALL RESPONSES TO PHQ9 QUESTIONS 1 & 2: 0
2. FEELING DOWN, DEPRESSED OR HOPELESS: NOT AT ALL
1. LITTLE INTEREST OR PLEASURE IN DOING THINGS: NOT AT ALL
1. LITTLE INTEREST OR PLEASURE IN DOING THINGS: NOT AT ALL
2. FEELING DOWN, DEPRESSED OR HOPELESS: NOT AT ALL

## 2025-03-07 ENCOUNTER — OFFICE VISIT (OUTPATIENT)
Dept: FAMILY MEDICINE CLINIC | Facility: CLINIC | Age: 80
End: 2025-03-07
Payer: COMMERCIAL

## 2025-03-07 VITALS
HEIGHT: 72 IN | BODY MASS INDEX: 23.03 KG/M2 | HEART RATE: 74 BPM | DIASTOLIC BLOOD PRESSURE: 78 MMHG | WEIGHT: 170 LBS | TEMPERATURE: 98.1 F | OXYGEN SATURATION: 98 % | SYSTOLIC BLOOD PRESSURE: 135 MMHG

## 2025-03-07 DIAGNOSIS — I83.813 VARICOSE VEINS OF BOTH LOWER EXTREMITIES WITH PAIN: Primary | ICD-10-CM

## 2025-03-07 PROCEDURE — 1123F ACP DISCUSS/DSCN MKR DOCD: CPT | Performed by: FAMILY MEDICINE

## 2025-03-07 PROCEDURE — 99213 OFFICE O/P EST LOW 20 MIN: CPT | Performed by: FAMILY MEDICINE

## 2025-03-07 PROCEDURE — 1126F AMNT PAIN NOTED NONE PRSNT: CPT | Performed by: FAMILY MEDICINE

## 2025-03-07 PROCEDURE — 1159F MED LIST DOCD IN RCRD: CPT | Performed by: FAMILY MEDICINE

## 2025-03-07 NOTE — PROGRESS NOTES
135/78  Wt Readings from Last 3 Encounters:   03/07/25 77.1 kg (170 lb)   12/06/24 72.6 kg (160 lb 0.9 oz)   11/07/24 72.6 kg (160 lb)      BP Readings from Last 3 Encounters:   03/07/25 135/78   11/07/24 110/70   05/07/24 124/70        Physical Exam  Physical Exam  Skin:     General: Skin is warm and dry.      Comments: Dry skin bilat LE -   Some varicose veins - prominent in R leg. Swelling  L leg - some posterior Varicose vein prominence            Results  Imaging  Ultrasound showed some deteriorating veins.       ASSESSMENT & PLAN      I have reviewed the patient's past medical history, social history and family history and vitals.  We have discussed treatment plan and follow up and given patient instructions.  Patient's questions are answered and we will follow up as indicated.  1. Varicose veins of both lower extremities with pain  -     External Referral to Vascular Surgery       Assessment & Plan  1. Chronic venous insufficiency.  He presents with chronic venous changes in his lower extremities, indicative of chronic venous insufficiency. He reports no current pain but experiences occasional swelling if he does not walk enough. Previous treatments included injections and ablation, which were effective. He is advised to use a cream on his leg due to flakey skin. .  A referral to vascular surgery will be initiated for further evaluation and management.    PROCEDURE  The patient previously underwent injections and laser ablation for varicose veins in his lower extremities.       Return for has f/u in May.         The patient (or guardian, if applicable) and other individuals in attendance with the patient were advised that Artificial Intelligence will be utilized during this visit to record, process the conversation to generate a clinical note, and support improvement of the AI technology. The patient (or guardian, if applicable) and other individuals in attendance at the appointment consented to the use

## 2025-04-04 ENCOUNTER — OFFICE VISIT (OUTPATIENT)
Dept: ENT CLINIC | Age: 80
End: 2025-04-04

## 2025-04-04 VITALS — RESPIRATION RATE: 12 BRPM | BODY MASS INDEX: 22.84 KG/M2 | WEIGHT: 168.6 LBS | HEIGHT: 72 IN

## 2025-04-04 DIAGNOSIS — C01 SQUAMOUS CELL CARCINOMA OF BASE OF TONGUE: Primary | ICD-10-CM

## 2025-04-04 ASSESSMENT — ENCOUNTER SYMPTOMS
EYES NEGATIVE: 1
ALLERGIC/IMMUNOLOGIC NEGATIVE: 1
GASTROINTESTINAL NEGATIVE: 1
RESPIRATORY NEGATIVE: 1

## 2025-04-04 NOTE — PROGRESS NOTES
Chief Complaint   Patient presents with    Follow-up     4 mos CA check        HPI:  Bernard Woody is a 80 y.o. male seen in follow-up for routine cancer surveillance. 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  Last seen back on 12/6/2024.  Doing great since then with no change in swallowing, sore throat, voice changes, or hemoptysis.  He did have some irritation at the ear excision site last year which resolved after treatment with oral antibiotics.  He is celebrating his 28th wedding anniversary this weekend.    Past Medical History, Past Surgical History, Family history, Social History, and Medications were all reviewed with the patient today and updated as necessary.     No Known Allergies  Patient Active Problem List   Diagnosis    Low back pain    Pure hypercholesterolemia    Pancytopenia due to antineoplastic chemotherapy    Acute deep vein thrombosis (DVT) of right upper extremity, unspecified vein    Acquired hypothyroidism    Normocytic anemia     Current Outpatient Medications   Medication Sig    levothyroxine (SYNTHROID) 100 MCG tablet Take 1 tablet by mouth daily    Acetaminophen (TYLENOL) 325 MG CAPS Take 650 mg by mouth in the morning and at bedtime arthritis    celecoxib (CELEBREX) 200 MG capsule Take 1 capsule by mouth daily (Patient taking differently: Take 1 capsule by mouth as needed)     No current facility-administered medications for this visit.     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

## 2025-05-07 ENCOUNTER — OFFICE VISIT (OUTPATIENT)
Dept: FAMILY MEDICINE CLINIC | Facility: CLINIC | Age: 80
End: 2025-05-07
Payer: COMMERCIAL

## 2025-05-07 VITALS
HEART RATE: 72 BPM | WEIGHT: 166.25 LBS | DIASTOLIC BLOOD PRESSURE: 78 MMHG | SYSTOLIC BLOOD PRESSURE: 122 MMHG | BODY MASS INDEX: 22.52 KG/M2 | TEMPERATURE: 98.1 F | OXYGEN SATURATION: 98 % | HEIGHT: 72 IN

## 2025-05-07 DIAGNOSIS — E03.9 ACQUIRED HYPOTHYROIDISM: ICD-10-CM

## 2025-05-07 DIAGNOSIS — Z00.00 MEDICARE ANNUAL WELLNESS VISIT, SUBSEQUENT: Primary | ICD-10-CM

## 2025-05-07 DIAGNOSIS — C01 SQUAMOUS CELL CARCINOMA OF BASE OF TONGUE (HCC): ICD-10-CM

## 2025-05-07 DIAGNOSIS — E78.2 MIXED HYPERLIPIDEMIA: ICD-10-CM

## 2025-05-07 DIAGNOSIS — I83.813 VARICOSE VEINS OF BOTH LOWER EXTREMITIES WITH PAIN: ICD-10-CM

## 2025-05-07 DIAGNOSIS — D64.9 ANEMIA, UNSPECIFIED TYPE: ICD-10-CM

## 2025-05-07 DIAGNOSIS — Z12.5 ENCOUNTER FOR PROSTATE CANCER SCREENING: ICD-10-CM

## 2025-05-07 DIAGNOSIS — Z00.00 MEDICARE ANNUAL WELLNESS VISIT, SUBSEQUENT: ICD-10-CM

## 2025-05-07 LAB
ALBUMIN SERPL-MCNC: 3.9 G/DL (ref 3.2–4.6)
ALBUMIN/GLOB SERPL: 1.1 (ref 1–1.9)
ALP SERPL-CCNC: 73 U/L (ref 40–129)
ALT SERPL-CCNC: 11 U/L (ref 8–55)
ANION GAP SERPL CALC-SCNC: 10 MMOL/L (ref 7–16)
AST SERPL-CCNC: 23 U/L (ref 15–37)
BASOPHILS # BLD: 0.04 K/UL (ref 0–0.2)
BASOPHILS NFR BLD: 1 % (ref 0–2)
BILIRUB SERPL-MCNC: 0.6 MG/DL (ref 0–1.2)
BUN SERPL-MCNC: 14 MG/DL (ref 8–23)
CALCIUM SERPL-MCNC: 9.6 MG/DL (ref 8.8–10.2)
CHLORIDE SERPL-SCNC: 103 MMOL/L (ref 98–107)
CHOLEST SERPL-MCNC: 229 MG/DL (ref 0–200)
CO2 SERPL-SCNC: 26 MMOL/L (ref 20–29)
CREAT SERPL-MCNC: 1.03 MG/DL (ref 0.8–1.3)
DIFFERENTIAL METHOD BLD: ABNORMAL
EOSINOPHIL # BLD: 0.23 K/UL (ref 0–0.8)
EOSINOPHIL NFR BLD: 5.6 % (ref 0.5–7.8)
ERYTHROCYTE [DISTWIDTH] IN BLOOD BY AUTOMATED COUNT: 12.4 % (ref 11.9–14.6)
GLOBULIN SER CALC-MCNC: 3.5 G/DL (ref 2.3–3.5)
GLUCOSE SERPL-MCNC: 82 MG/DL (ref 70–99)
HCT VFR BLD AUTO: 38.2 % (ref 41.1–50.3)
HDLC SERPL-MCNC: 66 MG/DL (ref 40–60)
HDLC SERPL: 3.5 (ref 0–5)
HGB BLD-MCNC: 13.2 G/DL (ref 13.6–17.2)
IMM GRANULOCYTES # BLD AUTO: 0.01 K/UL (ref 0–0.5)
IMM GRANULOCYTES NFR BLD AUTO: 0.2 % (ref 0–5)
IRON SATN MFR SERPL: 34 % (ref 20–50)
IRON SERPL-MCNC: 91 UG/DL (ref 35–100)
LDLC SERPL CALC-MCNC: 146 MG/DL (ref 0–100)
LYMPHOCYTES # BLD: 0.4 K/UL (ref 0.5–4.6)
LYMPHOCYTES NFR BLD: 9.7 % (ref 13–44)
MCH RBC QN AUTO: 30.6 PG (ref 26.1–32.9)
MCHC RBC AUTO-ENTMCNC: 34.6 G/DL (ref 31.4–35)
MCV RBC AUTO: 88.4 FL (ref 82–102)
MONOCYTES # BLD: 0.64 K/UL (ref 0.1–1.3)
MONOCYTES NFR BLD: 15.5 % (ref 4–12)
NEUTS SEG # BLD: 2.82 K/UL (ref 1.7–8.2)
NEUTS SEG NFR BLD: 68 % (ref 43–78)
NRBC # BLD: 0 K/UL (ref 0–0.2)
PLATELET # BLD AUTO: 187 K/UL (ref 150–450)
PMV BLD AUTO: 9.1 FL (ref 9.4–12.3)
POTASSIUM SERPL-SCNC: 4.2 MMOL/L (ref 3.5–5.1)
PROT SERPL-MCNC: 7.4 G/DL (ref 6.3–8.2)
PSA SERPL-MCNC: 0.9 NG/ML (ref 0–4)
RBC # BLD AUTO: 4.32 M/UL (ref 4.23–5.6)
SODIUM SERPL-SCNC: 139 MMOL/L (ref 136–145)
T4 FREE SERPL-MCNC: 1.4 NG/DL (ref 0.9–1.7)
TIBC SERPL-MCNC: 271 UG/DL (ref 240–450)
TRIGL SERPL-MCNC: 84 MG/DL (ref 0–150)
TSH, 3RD GENERATION: 4.43 UIU/ML (ref 0.27–4.2)
UIBC SERPL-MCNC: 180 UG/DL (ref 112–347)
VLDLC SERPL CALC-MCNC: 17 MG/DL (ref 6–23)
WBC # BLD AUTO: 4.1 K/UL (ref 4.3–11.1)

## 2025-05-07 PROCEDURE — 1123F ACP DISCUSS/DSCN MKR DOCD: CPT | Performed by: FAMILY MEDICINE

## 2025-05-07 PROCEDURE — 1126F AMNT PAIN NOTED NONE PRSNT: CPT | Performed by: FAMILY MEDICINE

## 2025-05-07 PROCEDURE — G0439 PPPS, SUBSEQ VISIT: HCPCS | Performed by: FAMILY MEDICINE

## 2025-05-07 PROCEDURE — G2211 COMPLEX E/M VISIT ADD ON: HCPCS | Performed by: FAMILY MEDICINE

## 2025-05-07 PROCEDURE — 1160F RVW MEDS BY RX/DR IN RCRD: CPT | Performed by: FAMILY MEDICINE

## 2025-05-07 PROCEDURE — 99214 OFFICE O/P EST MOD 30 MIN: CPT | Performed by: FAMILY MEDICINE

## 2025-05-07 PROCEDURE — 1159F MED LIST DOCD IN RCRD: CPT | Performed by: FAMILY MEDICINE

## 2025-05-07 ASSESSMENT — ENCOUNTER SYMPTOMS
SHORTNESS OF BREATH: 0
DIARRHEA: 0
BACK PAIN: 0
ABDOMINAL PAIN: 0
WHEEZING: 0
COUGH: 0
CONSTIPATION: 0
CHEST TIGHTNESS: 0

## 2025-05-11 ENCOUNTER — RESULTS FOLLOW-UP (OUTPATIENT)
Dept: FAMILY MEDICINE CLINIC | Facility: CLINIC | Age: 80
End: 2025-05-11

## 2025-05-11 DIAGNOSIS — E03.9 ACQUIRED HYPOTHYROIDISM: Primary | ICD-10-CM

## 2025-05-11 RX ORDER — LEVOTHYROXINE SODIUM 112 UG/1
112 TABLET ORAL DAILY
Qty: 90 TABLET | Refills: 1 | Status: SHIPPED | OUTPATIENT
Start: 2025-05-11

## 2025-05-22 ENCOUNTER — TELEPHONE (OUTPATIENT)
Dept: FAMILY MEDICINE CLINIC | Facility: CLINIC | Age: 80
End: 2025-05-22

## 2025-05-22 NOTE — TELEPHONE ENCOUNTER
His insurance needs a letter for his medical condition.  Please reconsider this for him to continue  please call the patient.

## 2025-05-22 NOTE — TELEPHONE ENCOUNTER
Patient was evaluated at [facility] vein center in February 2025 & they said he was approved for [place]. A month later, the insurance denied the claim stating they were out of network. He is requesting you write a letter stating you recommend he continue with [facility] as he has already been evaluated by them & to reconsider letting him use them per your medical opinion

## 2025-06-02 ENCOUNTER — TELEPHONE (OUTPATIENT)
Dept: FAMILY MEDICINE CLINIC | Facility: CLINIC | Age: 80
End: 2025-06-02

## 2025-06-02 NOTE — TELEPHONE ENCOUNTER
Patient notified per Dr Mcdaniel:I cannot refer this patient if it is not covered by his insurance company and if his insurance company  covers another vein center then I would have to refer him there. Patient understands
